# Patient Record
Sex: MALE | Race: WHITE | NOT HISPANIC OR LATINO | Employment: OTHER | URBAN - METROPOLITAN AREA
[De-identification: names, ages, dates, MRNs, and addresses within clinical notes are randomized per-mention and may not be internally consistent; named-entity substitution may affect disease eponyms.]

---

## 2018-08-13 LAB — CRC RECOMMENDATION EXT: NORMAL

## 2018-09-07 ENCOUNTER — OFFICE VISIT (OUTPATIENT)
Dept: UROLOGY | Facility: CLINIC | Age: 58
End: 2018-09-07
Payer: COMMERCIAL

## 2018-09-07 VITALS
HEART RATE: 85 BPM | TEMPERATURE: 98 F | HEIGHT: 67 IN | DIASTOLIC BLOOD PRESSURE: 77 MMHG | SYSTOLIC BLOOD PRESSURE: 129 MMHG

## 2018-09-07 DIAGNOSIS — R30.0 DYSURIA: Primary | ICD-10-CM

## 2018-09-07 DIAGNOSIS — N40.1 BPH WITH OBSTRUCTION/LOWER URINARY TRACT SYMPTOMS: ICD-10-CM

## 2018-09-07 DIAGNOSIS — N13.8 BPH WITH OBSTRUCTION/LOWER URINARY TRACT SYMPTOMS: ICD-10-CM

## 2018-09-07 DIAGNOSIS — R39.198 INCREASING RESIDUAL URINE: ICD-10-CM

## 2018-09-07 DIAGNOSIS — R30.9 PAINFUL URINATION: ICD-10-CM

## 2018-09-07 DIAGNOSIS — R39.15 URINARY URGENCY: ICD-10-CM

## 2018-09-07 LAB
BACTERIA #/AREA URNS AUTO: NORMAL /HPF
BILIRUB UR QL STRIP: NEGATIVE
CLARITY UR REFRACT.AUTO: CLEAR
COLOR UR AUTO: ABNORMAL
GLUCOSE UR QL STRIP: NEGATIVE
HGB UR QL STRIP: NEGATIVE
KETONES UR QL STRIP: NEGATIVE
LEUKOCYTE ESTERASE UR QL STRIP: NEGATIVE
MICROSCOPIC COMMENT: NORMAL
NITRITE UR QL STRIP: POSITIVE
PH UR STRIP: 5 [PH] (ref 5–8)
PROT UR QL STRIP: NEGATIVE
SP GR UR STRIP: 1.01 (ref 1–1.03)
URN SPEC COLLECT METH UR: ABNORMAL
UROBILINOGEN UR STRIP-ACNC: 4 EU/DL
WBC #/AREA URNS AUTO: 0 /HPF (ref 0–5)

## 2018-09-07 PROCEDURE — 87086 URINE CULTURE/COLONY COUNT: CPT

## 2018-09-07 PROCEDURE — 99204 OFFICE O/P NEW MOD 45 MIN: CPT | Mod: 25,S$GLB,, | Performed by: STUDENT IN AN ORGANIZED HEALTH CARE EDUCATION/TRAINING PROGRAM

## 2018-09-07 PROCEDURE — 81001 URINALYSIS AUTO W/SCOPE: CPT

## 2018-09-07 PROCEDURE — 99999 PR PBB SHADOW E&M-NEW PATIENT-LVL III: CPT | Mod: PBBFAC,,, | Performed by: STUDENT IN AN ORGANIZED HEALTH CARE EDUCATION/TRAINING PROGRAM

## 2018-09-07 PROCEDURE — 51702 INSERT TEMP BLADDER CATH: CPT | Mod: S$GLB,,, | Performed by: STUDENT IN AN ORGANIZED HEALTH CARE EDUCATION/TRAINING PROGRAM

## 2018-09-07 RX ORDER — SULFAMETHOXAZOLE AND TRIMETHOPRIM 800; 160 MG/1; MG/1
1 TABLET ORAL 2 TIMES DAILY
Refills: 0 | COMMUNITY
Start: 2018-09-03 | End: 2018-09-07

## 2018-09-07 RX ORDER — ATORVASTATIN CALCIUM 40 MG/1
40 TABLET, FILM COATED ORAL NIGHTLY
COMMUNITY
End: 2022-06-03 | Stop reason: SDUPTHER

## 2018-09-07 RX ORDER — PHENAZOPYRIDINE HYDROCHLORIDE 200 MG/1
200 TABLET, FILM COATED ORAL 3 TIMES DAILY PRN
COMMUNITY
End: 2018-10-11 | Stop reason: SDUPTHER

## 2018-09-07 RX ORDER — PAROXETINE 10 MG/1
10 TABLET, FILM COATED ORAL NIGHTLY
COMMUNITY
End: 2022-06-03 | Stop reason: SDUPTHER

## 2018-09-07 RX ORDER — OMEPRAZOLE 20 MG/1
20 CAPSULE, DELAYED RELEASE ORAL NIGHTLY
COMMUNITY
End: 2022-06-03 | Stop reason: SDUPTHER

## 2018-09-07 RX ORDER — ICOSAPENT ETHYL 1000 MG/1
CAPSULE ORAL NIGHTLY
Refills: 4 | COMMUNITY
Start: 2018-08-02 | End: 2022-06-03 | Stop reason: SDUPTHER

## 2018-09-07 RX ORDER — ASPIRIN 81 MG/1
81 TABLET ORAL DAILY
COMMUNITY
End: 2021-10-23

## 2018-09-07 RX ORDER — OXYBUTYNIN CHLORIDE 10 MG/1
10 TABLET, EXTENDED RELEASE ORAL DAILY
COMMUNITY
End: 2018-09-07

## 2018-09-07 RX ORDER — TAMSULOSIN HYDROCHLORIDE 0.4 MG/1
0.4 CAPSULE ORAL DAILY
COMMUNITY
End: 2021-11-01

## 2018-09-07 NOTE — PROGRESS NOTES
Subjective:       Patient ID: Vaughn Dietz is a 58 y.o. male.    Chief Complaint:  Pain with urination  This is a 58 y.o.  male patient that is new to me.  The patient is self referred to me for burning with urination and presents as an add on to clinic today.  Over the weekend on Saturday he experienced chills. A day or 2 later, he experienced difficulty urinating. He notes he is only able to void a small amount about 1-2 ounces at the most. He notes significant dysuria when he begins to urinate which is extremely painful for him. He is also having difficulty controlling his bowels. On Monday he called Dr. Joy's office and was prescribed bactrim which he did start on Monday. He has been taking this medication since Thursday and states he did not notice any improvement. He made an appointment and he saw Dr. Joy on Thursday.  He prescribed a medicine to help him with overactive bladder - oxybutynin. He is unsure if a urine culture was sent. He does not have any history of UTIs in the past. He has not had any unprotected sexual activity. Denies kidney stones. He has been taking flomax for many years.   PCP - at [facility]. He previously saw Dr. Briseno before switching to Dr. Joy.   Daughter works for [employer].   ---  Past Medical History:   Diagnosis Date    GERD (gastroesophageal reflux disease)     Hyperlipidemia        Past Surgical History:   Procedure Laterality Date    APPENDECTOMY      5 years old    COSMETIC SURGERY         History reviewed. No pertinent family history.    Social History     Tobacco Use    Smoking status: Never Smoker    Smokeless tobacco: Never Used   Substance Use Topics    Alcohol use: Not on file    Drug use: Not on file       Current Outpatient Medications on File Prior to Visit   Medication Sig Dispense Refill    aspirin (ECOTRIN) 81 MG EC tablet Take 81 mg by mouth once daily.      atorvastatin (LIPITOR) 40 MG tablet Take 40 mg by mouth once daily.      omeprazole (PRILOSEC)  20 MG capsule Take 20 mg by mouth once daily.      paroxetine (PAXIL) 10 MG tablet Take 10 mg by mouth every morning.      phenazopyridine (PYRIDIUM) 200 MG tablet Take 200 mg by mouth 3 (three) times daily as needed for Pain.      tamsulosin (FLOMAX) 0.4 mg Cap Take 0.4 mg by mouth once daily.      VASCEPA 1 gram Cap TK 1 C PO QD  4    [DISCONTINUED] oxybutynin (DITROPAN-XL) 10 MG 24 hr tablet Take 10 mg by mouth once daily.      [DISCONTINUED] sulfamethoxazole-trimethoprim 800-160mg (BACTRIM DS) 800-160 mg Tab Take 1 tablet by mouth 2 (two) times daily.  0     No current facility-administered medications on file prior to visit.        Review of patient's allergies indicates:  Allergies not on file    Review of Systems   Constitutional: Negative for chills.   HENT: Negative for congestion.    Eyes: Negative for visual disturbance.   Respiratory: Negative for shortness of breath.    Cardiovascular: Negative for chest pain.   Gastrointestinal: Negative for abdominal distention.   Musculoskeletal: Negative for gait problem.   Skin: Negative for color change.   Neurological: Negative for dizziness.   Psychiatric/Behavioral: Negative for agitation.       Objective:      Physical Exam   Constitutional: He appears well-developed and well-nourished.   HENT:   Head: Normocephalic.   Eyes: Pupils are equal, round, and reactive to light.   Neck: Normal range of motion.   Cardiovascular: Intact distal pulses.   Pulmonary/Chest: Effort normal.   Abdominal: Soft. He exhibits no distension. There is no tenderness.   Genitourinary:   Genitourinary Comments: Circumcised phallus, normal orthotopic meatus. No lesions.   TICO not performed in light of active prostatitis process.  16 french coude inserted using sterile techniques, drained about 280-290 of orange urine. Some urine collected in specimen cup to be sent for Ua and culture. Inflated de la garza with 10cc of sterile saline and connected to a leg bag.   Musculoskeletal: Normal  range of motion.   Neurological: He is alert.   Skin: Skin is warm and dry.   Psychiatric: He has a normal mood and affect.       Assessment:       1. Dysuria    2. Urinary urgency    3. Painful urination    4. Increasing residual urine    5. BPH with obstruction/lower urinary tract symptoms        Plan:         I counseled the patient that he may be experiencing a UTI versus prostatitis. He likely has a history of enlarged prostate as he was started on flomax and has been taking flomax for years.     1. Catheterized urine sample sent for UA and culture, if UTI will await sensitivities while patient has been started on cipro. Physical prescription handwritten and given to patient.   2. Physical prescription for cipro x 30 days for presumed prostatitis given to patient. 1 refill on the Rx.   3. Since his residual when checked after voiding was 287ml and he is experiencing significant hesitancy and only voiding small volumes, I offered him a de la garza catheter to help alleviate his dysuria and the large residual of urine for the weekend. He was willing to undergo de la garza placement. Inserted 16 Tajik coude which drained about 280cc of orange urine (patient was taking Azo). He will also stop taking oxybutynin.  4. RTC on Monday for voiding trial.     Dysuria  -     Urinalysis  -     Urinalysis Microscopic  -     Urine culture    Urinary urgency    Painful urination    Increasing residual urine    BPH with obstruction/lower urinary tract symptoms

## 2018-09-08 LAB — BACTERIA UR CULT: NO GROWTH

## 2018-09-10 ENCOUNTER — OFFICE VISIT (OUTPATIENT)
Dept: UROLOGY | Facility: CLINIC | Age: 58
End: 2018-09-10
Payer: COMMERCIAL

## 2018-09-10 VITALS
BODY MASS INDEX: 33.64 KG/M2 | HEIGHT: 67 IN | TEMPERATURE: 98 F | HEART RATE: 93 BPM | WEIGHT: 214.31 LBS | SYSTOLIC BLOOD PRESSURE: 138 MMHG | DIASTOLIC BLOOD PRESSURE: 80 MMHG

## 2018-09-10 DIAGNOSIS — N41.9 PROSTATITIS, UNSPECIFIED PROSTATITIS TYPE: Primary | ICD-10-CM

## 2018-09-10 DIAGNOSIS — R30.0 DYSURIA: ICD-10-CM

## 2018-09-10 DIAGNOSIS — R39.198 INCREASING RESIDUAL URINE: ICD-10-CM

## 2018-09-10 PROCEDURE — 3008F BODY MASS INDEX DOCD: CPT | Mod: CPTII,S$GLB,, | Performed by: STUDENT IN AN ORGANIZED HEALTH CARE EDUCATION/TRAINING PROGRAM

## 2018-09-10 PROCEDURE — 99213 OFFICE O/P EST LOW 20 MIN: CPT | Mod: S$GLB,,, | Performed by: STUDENT IN AN ORGANIZED HEALTH CARE EDUCATION/TRAINING PROGRAM

## 2018-09-10 PROCEDURE — 99999 PR PBB SHADOW E&M-EST. PATIENT-LVL III: CPT | Mod: PBBFAC,,, | Performed by: STUDENT IN AN ORGANIZED HEALTH CARE EDUCATION/TRAINING PROGRAM

## 2018-09-10 RX ORDER — CIPROFLOXACIN 500 MG/1
TABLET ORAL
Refills: 1 | COMMUNITY
Start: 2018-09-07 | End: 2021-02-01

## 2018-09-10 NOTE — PROGRESS NOTES
Subjective:       Patient ID: Vaughn Dietz is a 58 y.o. male.    Chief Complaint: Follow-up (De La Garza remove)  This is a 58 y.o.  male patient that is an established patient of mine.   The patient is self referred to me for burning with urination and presents as an add on to clinic today.  Over the weekend on Saturday he experienced chills. A day or 2 later, he experienced difficulty urinating. He notes he is only able to void a small amount about 1-2 ounces at the most. He notes significant dysuria when he begins to urinate which is extremely painful for him. He is also having difficulty controlling his bowels. On Monday he called Dr. Joy's office and was prescribed bactrim which he did start on Monday. He has been taking this medication since Thursday and states he did not notice any improvement. He made an appointment and he saw Dr. Joy on Thursday.  He prescribed a medicine to help him with overactive bladder - oxybutynin. He is unsure if a urine culture was sent. He does not have any history of UTIs in the past. He has not had any unprotected sexual activity. Denies kidney stones. He has been taking flomax for many years.   PCP - at [facility]. He previously saw Dr. Briseno before switching to Dr. Joy.   Daughter works for [employer].     9/10/18  He has been taking cipro since he last saw me. He states the weekend was much more bearable due to the de la garza catheter insertion. He has been taking flomax. He is here today for a voiding trial.     ---  Past Medical History:   Diagnosis Date    GERD (gastroesophageal reflux disease)     Hyperlipidemia        Past Surgical History:   Procedure Laterality Date    APPENDECTOMY      5 years old    COSMETIC SURGERY         No family history on file.    Social History     Tobacco Use    Smoking status: Never Smoker    Smokeless tobacco: Never Used   Substance Use Topics    Alcohol use: Not on file    Drug use: Not on file       Current Outpatient Medications on File Prior  to Visit   Medication Sig Dispense Refill    aspirin (ECOTRIN) 81 MG EC tablet Take 81 mg by mouth once daily.      atorvastatin (LIPITOR) 40 MG tablet Take 40 mg by mouth once daily.      ciprofloxacin HCl (CIPRO) 500 MG tablet TK 1 T PO  BID FOR 30 DAYS  1    omeprazole (PRILOSEC) 20 MG capsule Take 20 mg by mouth once daily.      paroxetine (PAXIL) 10 MG tablet Take 10 mg by mouth every morning.      phenazopyridine (PYRIDIUM) 200 MG tablet Take 200 mg by mouth 3 (three) times daily as needed for Pain.      tamsulosin (FLOMAX) 0.4 mg Cap Take 0.4 mg by mouth once daily.      VASCEPA 1 gram Cap TK 1 C PO QD  4     No current facility-administered medications on file prior to visit.        Review of patient's allergies indicates:  No Known Allergies    Review of Systems   Constitutional: Negative for chills.   HENT: Negative for congestion.    Eyes: Negative for visual disturbance.   Respiratory: Negative for shortness of breath.    Cardiovascular: Negative for chest pain.   Gastrointestinal: Negative for abdominal distention.   Musculoskeletal: Negative for gait problem.   Skin: Negative for color change.   Neurological: Negative for dizziness.   Psychiatric/Behavioral: Negative for agitation.       Objective:      Physical Exam   Constitutional: He appears well-developed and well-nourished.   HENT:   Head: Normocephalic.   Eyes: Pupils are equal, round, and reactive to light.   Neck: Normal range of motion.   Cardiovascular: Intact distal pulses.   Pulmonary/Chest: Effort normal.   Abdominal: Soft. He exhibits no distension. There is no tenderness.   Genitourinary:   Genitourinary Comments: Catheter draining orange urine into leg bag (patient on pyridium); removed today in clinic successful voiding trial   Musculoskeletal: Normal range of motion.   Neurological: He is alert.   Skin: Skin is warm and dry.   Psychiatric: He has a normal mood and affect.       Assessment:       1. Prostatitis, unspecified  prostatitis type    2. Dysuria    3. Increasing residual urine        Plan:       1. Successful voiding trial in clinic.  2. Patient returns back about 1 hour later, he successfully voided again.  3. Will have patient continue cipro antibiotics.  4. RTC in 4 weeks for another uroflow, bladder scan, AUA symptom score survey. Can consider cystoscopy or other workup if symptoms still bothersome.   5. Patient has PSA checked by primary care physicians, will bring results to next clinic visit.    Prostatitis, unspecified prostatitis type    Dysuria    Increasing residual urine

## 2018-10-08 ENCOUNTER — OFFICE VISIT (OUTPATIENT)
Dept: UROLOGY | Facility: CLINIC | Age: 58
End: 2018-10-08
Payer: COMMERCIAL

## 2018-10-08 VITALS
DIASTOLIC BLOOD PRESSURE: 82 MMHG | HEIGHT: 67 IN | HEART RATE: 86 BPM | WEIGHT: 214 LBS | SYSTOLIC BLOOD PRESSURE: 122 MMHG | BODY MASS INDEX: 33.59 KG/M2

## 2018-10-08 DIAGNOSIS — R30.0 DYSURIA: ICD-10-CM

## 2018-10-08 DIAGNOSIS — N40.1 BPH WITH OBSTRUCTION/LOWER URINARY TRACT SYMPTOMS: Primary | ICD-10-CM

## 2018-10-08 DIAGNOSIS — N13.8 BPH WITH OBSTRUCTION/LOWER URINARY TRACT SYMPTOMS: Primary | ICD-10-CM

## 2018-10-08 LAB
BILIRUB UR QL STRIP: NEGATIVE
CLARITY UR REFRACT.AUTO: CLEAR
COLOR UR AUTO: YELLOW
GLUCOSE UR QL STRIP: NEGATIVE
HGB UR QL STRIP: NEGATIVE
KETONES UR QL STRIP: NEGATIVE
LEUKOCYTE ESTERASE UR QL STRIP: NEGATIVE
MICROSCOPIC COMMENT: NORMAL
NITRITE UR QL STRIP: NEGATIVE
PH UR STRIP: 5 [PH] (ref 5–8)
PROT UR QL STRIP: NEGATIVE
RBC #/AREA URNS AUTO: 0 /HPF (ref 0–4)
SP GR UR STRIP: 1.02 (ref 1–1.03)
SQUAMOUS #/AREA URNS AUTO: 0 /HPF
URN SPEC COLLECT METH UR: NORMAL
UROBILINOGEN UR STRIP-ACNC: NEGATIVE EU/DL
WBC #/AREA URNS AUTO: 1 /HPF (ref 0–5)

## 2018-10-08 PROCEDURE — 51741 ELECTRO-UROFLOWMETRY FIRST: CPT | Mod: S$GLB,,, | Performed by: STUDENT IN AN ORGANIZED HEALTH CARE EDUCATION/TRAINING PROGRAM

## 2018-10-08 PROCEDURE — 99213 OFFICE O/P EST LOW 20 MIN: CPT | Mod: 25,S$GLB,, | Performed by: STUDENT IN AN ORGANIZED HEALTH CARE EDUCATION/TRAINING PROGRAM

## 2018-10-08 PROCEDURE — 81001 URINALYSIS AUTO W/SCOPE: CPT

## 2018-10-08 PROCEDURE — 87086 URINE CULTURE/COLONY COUNT: CPT

## 2018-10-08 PROCEDURE — 99999 PR PBB SHADOW E&M-EST. PATIENT-LVL III: CPT | Mod: PBBFAC,,, | Performed by: STUDENT IN AN ORGANIZED HEALTH CARE EDUCATION/TRAINING PROGRAM

## 2018-10-08 PROCEDURE — 3008F BODY MASS INDEX DOCD: CPT | Mod: CPTII,S$GLB,, | Performed by: STUDENT IN AN ORGANIZED HEALTH CARE EDUCATION/TRAINING PROGRAM

## 2018-10-08 RX ORDER — FINASTERIDE 5 MG/1
5 TABLET, FILM COATED ORAL DAILY
COMMUNITY
Start: 2018-10-08 | End: 2019-09-07 | Stop reason: SDUPTHER

## 2018-10-08 NOTE — PROGRESS NOTES
"Subjective:       Patient ID: Pietro Dietz is a 58 y.o. male.    Chief Complaint: burning (deep in penis)  This is a 58 y.o.  male patient that is an established patient of mine.  The patient is self referred to me for burning with urination and presents as an add on to clinic today.  Over the weekend on Saturday he experienced chills. A day or 2 later, he experienced difficulty urinating. He notes he is only able to void a small amount about 1-2 ounces at the most. He notes significant dysuria when he begins to urinate which is extremely painful for him. He is also having difficulty controlling his bowels. On Monday he called Dr. Joy's office and was prescribed bactrim which he did start on Monday. He has been taking this medication since Thursday and states he did not notice any improvement. He made an appointment and he saw Dr. Joy on Thursday.  He prescribed a medicine to help him with overactive bladder - oxybutynin. He is unsure if a urine culture was sent. He does not have any history of UTIs in the past. He has not had any unprotected sexual activity. Denies kidney stones. He has been taking flomax for many years.   PCP - at [facility]. He previously saw Dr. Briseno before switching to Dr. Joy.   Daughter works for [employer].     9/10/18  He has been taking cipro since he last saw me. He states the weekend was much more bearable due to the de la garza catheter insertion. He has been taking flomax. He is here today for a voiding trial.    He notes about 1 week and a half ago he notes that he feels that he has a stream.     10/8/18  He has been taking cipro and started on his second month of cipro. About 1-1.5 weeks ago he began to experience burning and a weak stream. He is concerned that maybe he had a "scab" or a "blockage" that developed after the catheter was removed. He stopped taking pyridium. He has been taking flomax (and has been on this for years).     Uroflow - 115cc, low flowrate, flat peak. qmax 3.7 " ml/s; qavgt 2.3ml/s  PVR 6cc    Patient pulled up his previous labs - 2017 - PSA 2.6.     His AUA symptom score today was 14  Incomplete emptying 0  Frequency:  5  Intermittency:  0  Urgency:  3  Weak stream:  5  Strainin  Nocturia:  1  Bother:   5, Unhappy      ---  Past Medical History:   Diagnosis Date    GERD (gastroesophageal reflux disease)     Hyperlipidemia        Past Surgical History:   Procedure Laterality Date    APPENDECTOMY      5 years old    COSMETIC SURGERY         Family History   Problem Relation Age of Onset    Prostate cancer Neg Hx     Kidney disease Neg Hx        Social History     Tobacco Use    Smoking status: Never Smoker    Smokeless tobacco: Never Used   Substance Use Topics    Alcohol use: Yes    Drug use: No       Current Outpatient Medications on File Prior to Visit   Medication Sig Dispense Refill    aspirin (ECOTRIN) 81 MG EC tablet Take 81 mg by mouth once daily.      atorvastatin (LIPITOR) 40 MG tablet Take 40 mg by mouth once daily.      ciprofloxacin HCl (CIPRO) 500 MG tablet TK 1 T PO  BID FOR 30 DAYS  1    finasteride (PROSCAR) 5 mg tablet Take 5 mg by mouth once daily.      omeprazole (PRILOSEC) 20 MG capsule Take 20 mg by mouth once daily.      paroxetine (PAXIL) 10 MG tablet Take 10 mg by mouth every morning.      phenazopyridine (PYRIDIUM) 200 MG tablet Take 200 mg by mouth 3 (three) times daily as needed for Pain.      tamsulosin (FLOMAX) 0.4 mg Cap Take 0.4 mg by mouth once daily.      VASCEPA 1 gram Cap TK 1 C PO QD  4     No current facility-administered medications on file prior to visit.        Review of patient's allergies indicates:   Allergen Reactions    Erythromycin Other (See Comments)       Review of Systems   Constitutional: Negative for chills.   HENT: Negative for congestion.    Eyes: Negative for visual disturbance.   Respiratory: Negative for shortness of breath.    Cardiovascular: Negative for chest pain.   Gastrointestinal:  Negative for abdominal distention.   Musculoskeletal: Negative for gait problem.   Skin: Negative for color change.   Neurological: Negative for dizziness.   Psychiatric/Behavioral: Negative for agitation.       Objective:      Physical Exam   Constitutional: He appears well-developed and well-nourished.   HENT:   Head: Normocephalic.   Eyes: Pupils are equal, round, and reactive to light.   Neck: Normal range of motion.   Cardiovascular: Intact distal pulses.   Pulmonary/Chest: Effort normal.   Abdominal: Soft. He exhibits no distension. There is no tenderness.   Genitourinary:   Genitourinary Comments: 40g, smooth, symmetrical, no hard nodules, nonboggy     Musculoskeletal: Normal range of motion.   Neurological: He is alert.   Skin: Skin is warm and dry.   Psychiatric: He has a normal mood and affect.       Assessment:       1. BPH with obstruction/lower urinary tract symptoms        Plan:       1. Finasteride 5mg 90 days with refills. Physical Rx given to patient. Also added onto med list today.   2. Lidocaine jelly given to patietn to instill for relief (urojet assembled and provided to patient).   3. Pyridium prn burning with urination.   4. Cystoscopy to evaluate prostatic urethra  5. Will send urine for UA and culture.     BPH with obstruction/lower urinary tract symptoms  -     Urinalysis  -     Urinalysis Microscopic  -     Urine culture  -     Cystoscopy; Future; Expected date: 10/08/2018

## 2018-10-09 LAB — BACTERIA UR CULT: NO GROWTH

## 2018-10-10 ENCOUNTER — TELEPHONE (OUTPATIENT)
Dept: UROLOGY | Facility: CLINIC | Age: 58
End: 2018-10-10

## 2018-10-10 NOTE — TELEPHONE ENCOUNTER
----- Message from Heather Walsh MD sent at 10/10/2018  8:04 AM CDT -----  Please call patient and notify of negative urine culture. The urine culture did not grow out bacteria in the urine concerning for a urinary tract infection (UTI), therefore no antibiotics are needed.

## 2018-10-11 ENCOUNTER — PROCEDURE VISIT (OUTPATIENT)
Dept: UROLOGY | Facility: CLINIC | Age: 58
End: 2018-10-11
Payer: COMMERCIAL

## 2018-10-11 ENCOUNTER — PATIENT MESSAGE (OUTPATIENT)
Dept: UROLOGY | Facility: CLINIC | Age: 58
End: 2018-10-11

## 2018-10-11 VITALS
BODY MASS INDEX: 33.59 KG/M2 | HEART RATE: 77 BPM | HEIGHT: 67 IN | DIASTOLIC BLOOD PRESSURE: 74 MMHG | SYSTOLIC BLOOD PRESSURE: 121 MMHG | TEMPERATURE: 98 F | WEIGHT: 214 LBS

## 2018-10-11 DIAGNOSIS — N35.811 OTHER URETHRAL STRICTURE, MALE, MEATAL: Primary | ICD-10-CM

## 2018-10-11 DIAGNOSIS — R39.12 WEAK URINARY STREAM: ICD-10-CM

## 2018-10-11 DIAGNOSIS — N40.1 BPH WITH OBSTRUCTION/LOWER URINARY TRACT SYMPTOMS: ICD-10-CM

## 2018-10-11 DIAGNOSIS — N13.8 BPH WITH OBSTRUCTION/LOWER URINARY TRACT SYMPTOMS: ICD-10-CM

## 2018-10-11 DIAGNOSIS — R39.16 STRAINING DURING URINATION: ICD-10-CM

## 2018-10-11 PROCEDURE — 52000 CYSTOURETHROSCOPY: CPT | Mod: S$GLB,,, | Performed by: STUDENT IN AN ORGANIZED HEALTH CARE EDUCATION/TRAINING PROGRAM

## 2018-10-11 RX ORDER — FINASTERIDE 5 MG/1
5 TABLET, FILM COATED ORAL DAILY
Qty: 15 TABLET | Refills: 0 | Status: SHIPPED | OUTPATIENT
Start: 2018-10-11 | End: 2018-10-26 | Stop reason: SDUPTHER

## 2018-10-11 RX ORDER — PHENAZOPYRIDINE HYDROCHLORIDE 200 MG/1
200 TABLET, FILM COATED ORAL 3 TIMES DAILY PRN
Qty: 15 TABLET | Refills: 3 | Status: SHIPPED | OUTPATIENT
Start: 2018-10-11 | End: 2018-10-16

## 2018-10-11 NOTE — PROCEDURES
Procedure:   1. Flexible cysto-uretheroscopy  2. Urethral dilation    Pre Procedure Diagnosis:  1. Straining to urinate  2. Weak stream    Post Procedure Diagnosis:  1. Straining to urinate  2. Weak stream  3. Urethral meatal stenosis  4. BPH    Surgeon: Heather Walsh MD    Anesthesia: 2% uro-jet lidocaine jelly for local analgesia    Procedure note:  A flexible cysto-urethroscopy was performed after consent was obtained.  The risks and benefits were explained. 2% lidocaine urojet was used for local analgesia. The genitalia was prepped and draped in the sterile fashion.     The flexible scope was initially attempted to be advanced into the urethra and into the bladder. The scope was met with resistance at the urethral meatus. A meatal stenosis was identified. The urethral dilators were used to dilate the patient up to 18 Bulgarian to accommodate the cystoscope. Afterwards the cystoscope was able to be advanced.     A urethral stricture was not seen during scope passage.     Once the cystoscope was in the bladder, we systematically surveyed the entire bladder. The bilateral ureteral orifices were identified in their normal orthotopic locations. clear bilateral ureteral efflux was identified.     The bladder was completely surveyed in a systematic fashion. No bladder tumors or lesions were seen. No trabeculations or cellules visible. Upon retroflexion a median lobe was noted to be present. There was a mild intravesical extension of a median lobe.    As the flexible cystoscope was being withdrawn, the prostate was evaluated carefully. The lateral lobes of the prostate were noted to be significant enlarged and in contacting with each other, known as kissing lobes.     The patient tolerated the procedure well without complication.     Findings in summary:  1. Urethral meatal stenosis - addressed with urethral dilators up to 18 Bulgarian.  2. Significantly enlarged lateral lobes of the prostate - kissing lobes. Mild median  lobe noted with intravesical extension.  3. No bladder tumors, stones, or any other untoward findings in the bladder.       Assessment: 58 y.o. male with weak stream, straining to urinate, and BPH and urethral meatal stenosis diagnosed upon attempt to pass cystoscope today.      Plan:  1. CIC initiation and counseling today. Patient will start to perform CIC daily x 1 week. Next week can space out to every 2 days. If patient begins to note that the catheter is more difficult to pass he will increase the frequency again. Will likely recommend at least q7 days CIC to ensure patency of urethral meatus.  2. BPH - continue flomax; patient will start finasteride for dual therapy.  3. PCG form for catheters filled out - 16 Frisian straight tip, male length. Checked off supplies required gloves, drapes, sterile wipes with lubricant packets. Length of need - indefinite.

## 2018-10-12 ENCOUNTER — PATIENT MESSAGE (OUTPATIENT)
Dept: UROLOGY | Facility: CLINIC | Age: 58
End: 2018-10-12

## 2018-10-26 RX ORDER — FINASTERIDE 5 MG/1
5 TABLET, FILM COATED ORAL DAILY
Qty: 15 TABLET | Refills: 0 | Status: SHIPPED | OUTPATIENT
Start: 2018-10-26 | End: 2018-11-10

## 2018-12-03 ENCOUNTER — OFFICE VISIT (OUTPATIENT)
Dept: UROLOGY | Facility: CLINIC | Age: 58
End: 2018-12-03
Payer: COMMERCIAL

## 2018-12-03 ENCOUNTER — PATIENT MESSAGE (OUTPATIENT)
Dept: UROLOGY | Facility: CLINIC | Age: 58
End: 2018-12-03

## 2018-12-03 VITALS
DIASTOLIC BLOOD PRESSURE: 88 MMHG | HEIGHT: 67 IN | WEIGHT: 214 LBS | BODY MASS INDEX: 33.59 KG/M2 | HEART RATE: 83 BPM | SYSTOLIC BLOOD PRESSURE: 153 MMHG

## 2018-12-03 DIAGNOSIS — N99.110 POSTPROCEDURAL URETHRAL STRICTURE, MALE, MEATAL: Primary | ICD-10-CM

## 2018-12-03 PROCEDURE — 99999 PR PBB SHADOW E&M-EST. PATIENT-LVL II: CPT | Mod: PBBFAC,,, | Performed by: STUDENT IN AN ORGANIZED HEALTH CARE EDUCATION/TRAINING PROGRAM

## 2018-12-03 PROCEDURE — 3008F BODY MASS INDEX DOCD: CPT | Mod: CPTII,S$GLB,, | Performed by: STUDENT IN AN ORGANIZED HEALTH CARE EDUCATION/TRAINING PROGRAM

## 2018-12-03 PROCEDURE — 99213 OFFICE O/P EST LOW 20 MIN: CPT | Mod: S$GLB,,, | Performed by: STUDENT IN AN ORGANIZED HEALTH CARE EDUCATION/TRAINING PROGRAM

## 2018-12-03 NOTE — PROGRESS NOTES
"Subjective:       Patient ID: Pietro Dietz is a 58 y.o. male.    Chief Complaint: followup  This is a 58 y.o.  male patient that is an established patient of mine.  The patient is self referred to me for burning with urination and presents as an add on to clinic today.  Over the weekend on Saturday he experienced chills. A day or 2 later, he experienced difficulty urinating. He notes he is only able to void a small amount about 1-2 ounces at the most. He notes significant dysuria when he begins to urinate which is extremely painful for him. He is also having difficulty controlling his bowels. On Monday he called Dr. Joy's office and was prescribed bactrim which he did start on Monday. He has been taking this medication since Thursday and states he did not notice any improvement. He made an appointment and he saw Dr. Joy on Thursday.  He prescribed a medicine to help him with overactive bladder - oxybutynin. He is unsure if a urine culture was sent. He does not have any history of UTIs in the past. He has not had any unprotected sexual activity. Denies kidney stones. He has been taking flomax for many years.   PCP - at [facility]. He previously saw Dr. Briseno before switching to Dr. Joy.   Daughter works for [employer].     9/10/18  He has been taking cipro since he last saw me. He states the weekend was much more bearable due to the de la garza catheter insertion. He has been taking flomax. He is here today for a voiding trial.    He notes about 1 week and a half ago he notes that he feels that he has a stream.     10/8/18  He has been taking cipro and started on his second month of cipro. About 1-1.5 weeks ago he began to experience burning and a weak stream. He is concerned that maybe he had a "scab" or a "blockage" that developed after the catheter was removed. He stopped taking pyridium. He has been taking flomax (and has been on this for years).     Uroflow - 115cc, low flowrate, flat peak. qmax 3.7 ml/s; qavgt " 2.3ml/s  PVR 6cc    Patient pulled up his previous labs - 2017 - PSA 2.6.     His AUA symptom score today was 14  Incomplete emptying 0  Frequency:  5  Intermittency:  0  Urgency:  3  Weak stream:  5  Strainin  Nocturia:  1  Bother:   5, Unhappy    He underwent a cystoscopy on 10/11/2018 and the findings were:  1. Urethral meatal stenosis - addressed with urethral dilators up to 18 Serbian.  2. Significantly enlarged lateral lobes of the prostate - kissing lobes. Mild median lobe noted with intravesical extension.  3. No bladder tumors, stones, or any other untoward findings in the bladder.     He returns back today to discuss that he last passed the catheter about 2-3 weeks ago and then a few days ago.  He has been intermittently passing a 16 Serbian catheter. He notes that the most recent passage was very difficult.     No results found for: CREATININE    ---  Past Medical History:   Diagnosis Date    GERD (gastroesophageal reflux disease)     Hyperlipidemia        Past Surgical History:   Procedure Laterality Date    APPENDECTOMY      5 years old    COSMETIC SURGERY         Family History   Problem Relation Age of Onset    Prostate cancer Neg Hx     Kidney disease Neg Hx        Social History     Tobacco Use    Smoking status: Never Smoker    Smokeless tobacco: Never Used   Substance Use Topics    Alcohol use: Yes    Drug use: No       Current Outpatient Medications on File Prior to Visit   Medication Sig Dispense Refill    aspirin (ECOTRIN) 81 MG EC tablet Take 81 mg by mouth once daily.      atorvastatin (LIPITOR) 40 MG tablet Take 40 mg by mouth once daily.      ciprofloxacin HCl (CIPRO) 500 MG tablet TK 1 T PO  BID FOR 30 DAYS  1    finasteride (PROSCAR) 5 mg tablet Take 5 mg by mouth once daily.      omeprazole (PRILOSEC) 20 MG capsule Take 20 mg by mouth once daily.      paroxetine (PAXIL) 10 MG tablet Take 10 mg by mouth every morning.      tamsulosin (FLOMAX) 0.4 mg Cap Take 0.4  mg by mouth once daily.      VASCEPA 1 gram Cap TK 1 C PO QD  4     No current facility-administered medications on file prior to visit.        Review of patient's allergies indicates:   Allergen Reactions    Erythromycin Other (See Comments)       Review of Systems   Constitutional: Negative for chills.   HENT: Negative for congestion.    Eyes: Negative for visual disturbance.   Respiratory: Negative for shortness of breath.    Cardiovascular: Negative for chest pain.   Gastrointestinal: Negative for abdominal distention.   Musculoskeletal: Negative for gait problem.   Skin: Negative for color change.   Neurological: Negative for dizziness.   Psychiatric/Behavioral: Negative for agitation.       Objective:      Physical Exam   Constitutional: He appears well-developed and well-nourished.   HENT:   Head: Normocephalic.   Eyes: Pupils are equal, round, and reactive to light.   Neck: Normal range of motion.   Cardiovascular: Intact distal pulses.   Pulmonary/Chest: Effort normal.   Musculoskeletal: Normal range of motion.   Neurological: He is alert.   Skin: Skin is warm and dry.   Psychiatric: He has a normal mood and affect.       Assessment:       1. Postprocedural urethral stricture, male, meatal        Plan:       1. The patient unfortunately was not clear on my instructions. He was to space out his catheterizations from daily the first week, to every other day the second week, to every 3 days the third week, and continue to space out if the catheter continues to pass well at the longest every 7 days.   2. Unfortunately he catheterized a few weeks ago and the urethra was met with resistance at his most recent catheterization but he was able to catheterize.  3. Will change the de la garza catheter order to 14 Gambian, at the most and least frequent, will catheterize every 7 days. If he meets resistance he should catheterize more often.    Postprocedural urethral stricture, male, meatal

## 2019-10-10 RX ORDER — FINASTERIDE 5 MG/1
TABLET, FILM COATED ORAL
Qty: 90 TABLET | Refills: 4 | Status: SHIPPED | OUTPATIENT
Start: 2019-10-10 | End: 2020-10-28

## 2021-01-30 ENCOUNTER — PATIENT MESSAGE (OUTPATIENT)
Dept: UROLOGY | Facility: CLINIC | Age: 61
End: 2021-01-30

## 2021-02-01 DIAGNOSIS — N41.9 PROSTATITIS, UNSPECIFIED PROSTATITIS TYPE: Primary | ICD-10-CM

## 2021-02-01 RX ORDER — CIPROFLOXACIN 500 MG/1
500 TABLET ORAL EVERY 12 HOURS
Qty: 60 TABLET | Refills: 0 | Status: SHIPPED | OUTPATIENT
Start: 2021-02-01 | End: 2021-03-03

## 2021-02-08 ENCOUNTER — OFFICE VISIT (OUTPATIENT)
Dept: UROLOGY | Facility: CLINIC | Age: 61
End: 2021-02-08
Payer: COMMERCIAL

## 2021-02-08 VITALS
BODY MASS INDEX: 34.39 KG/M2 | HEART RATE: 93 BPM | DIASTOLIC BLOOD PRESSURE: 85 MMHG | SYSTOLIC BLOOD PRESSURE: 151 MMHG | HEIGHT: 66 IN | WEIGHT: 214 LBS

## 2021-02-08 DIAGNOSIS — N50.819 PAIN IN TESTICLE, UNSPECIFIED LATERALITY: Primary | ICD-10-CM

## 2021-02-08 DIAGNOSIS — Z12.5 ENCOUNTER FOR PROSTATE CANCER SCREENING: ICD-10-CM

## 2021-02-08 PROCEDURE — 1125F PR PAIN SEVERITY QUANTIFIED, PAIN PRESENT: ICD-10-PCS | Mod: S$GLB,,, | Performed by: STUDENT IN AN ORGANIZED HEALTH CARE EDUCATION/TRAINING PROGRAM

## 2021-02-08 PROCEDURE — 3008F PR BODY MASS INDEX (BMI) DOCUMENTED: ICD-10-PCS | Mod: CPTII,S$GLB,, | Performed by: STUDENT IN AN ORGANIZED HEALTH CARE EDUCATION/TRAINING PROGRAM

## 2021-02-08 PROCEDURE — 1125F AMNT PAIN NOTED PAIN PRSNT: CPT | Mod: S$GLB,,, | Performed by: STUDENT IN AN ORGANIZED HEALTH CARE EDUCATION/TRAINING PROGRAM

## 2021-02-08 PROCEDURE — 99999 PR PBB SHADOW E&M-EST. PATIENT-LVL IV: ICD-10-PCS | Mod: PBBFAC,,, | Performed by: STUDENT IN AN ORGANIZED HEALTH CARE EDUCATION/TRAINING PROGRAM

## 2021-02-08 PROCEDURE — 99214 OFFICE O/P EST MOD 30 MIN: CPT | Mod: S$GLB,,, | Performed by: STUDENT IN AN ORGANIZED HEALTH CARE EDUCATION/TRAINING PROGRAM

## 2021-02-08 PROCEDURE — 3008F BODY MASS INDEX DOCD: CPT | Mod: CPTII,S$GLB,, | Performed by: STUDENT IN AN ORGANIZED HEALTH CARE EDUCATION/TRAINING PROGRAM

## 2021-02-08 PROCEDURE — 99214 PR OFFICE/OUTPT VISIT, EST, LEVL IV, 30-39 MIN: ICD-10-PCS | Mod: S$GLB,,, | Performed by: STUDENT IN AN ORGANIZED HEALTH CARE EDUCATION/TRAINING PROGRAM

## 2021-02-08 PROCEDURE — 99999 PR PBB SHADOW E&M-EST. PATIENT-LVL IV: CPT | Mod: PBBFAC,,, | Performed by: STUDENT IN AN ORGANIZED HEALTH CARE EDUCATION/TRAINING PROGRAM

## 2021-02-15 ENCOUNTER — HOSPITAL ENCOUNTER (OUTPATIENT)
Dept: RADIOLOGY | Facility: HOSPITAL | Age: 61
Discharge: HOME | End: 2021-02-15
Attending: STUDENT IN AN ORGANIZED HEALTH CARE EDUCATION/TRAINING PROGRAM
Payer: COMMERCIAL

## 2021-02-15 DIAGNOSIS — N50.819 PAIN IN TESTICLE, UNSPECIFIED LATERALITY: ICD-10-CM

## 2021-02-15 PROCEDURE — 76870 US EXAM SCROTUM: CPT | Mod: 26,,, | Performed by: RADIOLOGY

## 2021-02-15 PROCEDURE — 76870 US EXAM SCROTUM: CPT | Mod: TC

## 2021-02-15 PROCEDURE — 76870 US SCROTUM AND TESTICLES: ICD-10-PCS | Mod: 26,,, | Performed by: RADIOLOGY

## 2021-02-24 ENCOUNTER — PATIENT MESSAGE (OUTPATIENT)
Dept: UROLOGY | Facility: CLINIC | Age: 61
End: 2021-02-24

## 2021-02-25 ENCOUNTER — IMMUNIZATION (OUTPATIENT)
Dept: INTERNAL MEDICINE | Facility: CLINIC | Age: 61
End: 2021-02-25
Payer: COMMERCIAL

## 2021-02-25 DIAGNOSIS — Z23 NEED FOR VACCINATION: Primary | ICD-10-CM

## 2021-02-25 PROCEDURE — 91300 COVID-19, MRNA, LNP-S, PF, 30 MCG/0.3 ML DOSE VACCINE: ICD-10-PCS | Mod: S$GLB,,, | Performed by: INTERNAL MEDICINE

## 2021-02-25 PROCEDURE — 0001A COVID-19, MRNA, LNP-S, PF, 30 MCG/0.3 ML DOSE VACCINE: ICD-10-PCS | Mod: CV19,S$GLB,, | Performed by: INTERNAL MEDICINE

## 2021-02-25 PROCEDURE — 0001A COVID-19, MRNA, LNP-S, PF, 30 MCG/0.3 ML DOSE VACCINE: CPT | Mod: CV19,S$GLB,, | Performed by: INTERNAL MEDICINE

## 2021-02-25 PROCEDURE — 91300 COVID-19, MRNA, LNP-S, PF, 30 MCG/0.3 ML DOSE VACCINE: CPT | Mod: S$GLB,,, | Performed by: INTERNAL MEDICINE

## 2021-03-18 ENCOUNTER — IMMUNIZATION (OUTPATIENT)
Dept: INTERNAL MEDICINE | Facility: CLINIC | Age: 61
End: 2021-03-18
Payer: COMMERCIAL

## 2021-03-18 DIAGNOSIS — Z23 NEED FOR VACCINATION: Primary | ICD-10-CM

## 2021-03-18 PROCEDURE — 91300 COVID-19, MRNA, LNP-S, PF, 30 MCG/0.3 ML DOSE VACCINE: CPT | Mod: PBBFAC | Performed by: INTERNAL MEDICINE

## 2021-03-18 PROCEDURE — 0002A COVID-19, MRNA, LNP-S, PF, 30 MCG/0.3 ML DOSE VACCINE: CPT | Mod: PBBFAC | Performed by: INTERNAL MEDICINE

## 2021-05-05 ENCOUNTER — PATIENT MESSAGE (OUTPATIENT)
Dept: UROLOGY | Facility: CLINIC | Age: 61
End: 2021-05-05

## 2021-07-22 ENCOUNTER — OFFICE VISIT (OUTPATIENT)
Dept: UROLOGY | Facility: CLINIC | Age: 61
End: 2021-07-22
Payer: COMMERCIAL

## 2021-07-22 VITALS
DIASTOLIC BLOOD PRESSURE: 81 MMHG | WEIGHT: 198 LBS | HEIGHT: 66 IN | BODY MASS INDEX: 31.82 KG/M2 | HEART RATE: 75 BPM | SYSTOLIC BLOOD PRESSURE: 146 MMHG

## 2021-07-22 DIAGNOSIS — Z12.5 ENCOUNTER FOR PROSTATE CANCER SCREENING: Primary | ICD-10-CM

## 2021-07-22 DIAGNOSIS — R10.2 PERINEUM PAIN, MALE: ICD-10-CM

## 2021-07-22 PROCEDURE — 99214 PR OFFICE/OUTPT VISIT, EST, LEVL IV, 30-39 MIN: ICD-10-PCS | Mod: S$GLB,,, | Performed by: STUDENT IN AN ORGANIZED HEALTH CARE EDUCATION/TRAINING PROGRAM

## 2021-07-22 PROCEDURE — 99999 PR PBB SHADOW E&M-EST. PATIENT-LVL III: CPT | Mod: PBBFAC,,, | Performed by: STUDENT IN AN ORGANIZED HEALTH CARE EDUCATION/TRAINING PROGRAM

## 2021-07-22 PROCEDURE — 1126F PR PAIN SEVERITY QUANTIFIED, NO PAIN PRESENT: ICD-10-PCS | Mod: CPTII,S$GLB,, | Performed by: STUDENT IN AN ORGANIZED HEALTH CARE EDUCATION/TRAINING PROGRAM

## 2021-07-22 PROCEDURE — 1126F AMNT PAIN NOTED NONE PRSNT: CPT | Mod: CPTII,S$GLB,, | Performed by: STUDENT IN AN ORGANIZED HEALTH CARE EDUCATION/TRAINING PROGRAM

## 2021-07-22 PROCEDURE — 3008F PR BODY MASS INDEX (BMI) DOCUMENTED: ICD-10-PCS | Mod: CPTII,S$GLB,, | Performed by: STUDENT IN AN ORGANIZED HEALTH CARE EDUCATION/TRAINING PROGRAM

## 2021-07-22 PROCEDURE — 99999 PR PBB SHADOW E&M-EST. PATIENT-LVL III: ICD-10-PCS | Mod: PBBFAC,,, | Performed by: STUDENT IN AN ORGANIZED HEALTH CARE EDUCATION/TRAINING PROGRAM

## 2021-07-22 PROCEDURE — 99214 OFFICE O/P EST MOD 30 MIN: CPT | Mod: S$GLB,,, | Performed by: STUDENT IN AN ORGANIZED HEALTH CARE EDUCATION/TRAINING PROGRAM

## 2021-07-22 PROCEDURE — 3008F BODY MASS INDEX DOCD: CPT | Mod: CPTII,S$GLB,, | Performed by: STUDENT IN AN ORGANIZED HEALTH CARE EDUCATION/TRAINING PROGRAM

## 2021-09-28 ENCOUNTER — IMMUNIZATION (OUTPATIENT)
Dept: INTERNAL MEDICINE | Facility: CLINIC | Age: 61
End: 2021-09-28
Payer: COMMERCIAL

## 2021-09-28 DIAGNOSIS — Z23 NEED FOR VACCINATION: Primary | ICD-10-CM

## 2021-09-28 PROCEDURE — 0003A COVID-19, MRNA, LNP-S, PF, 30 MCG/0.3 ML DOSE VACCINE: CPT | Mod: CV19,PBBFAC | Performed by: INTERNAL MEDICINE

## 2021-09-28 PROCEDURE — 91300 COVID-19, MRNA, LNP-S, PF, 30 MCG/0.3 ML DOSE VACCINE: CPT | Mod: PBBFAC | Performed by: INTERNAL MEDICINE

## 2021-10-23 ENCOUNTER — HOSPITAL ENCOUNTER (EMERGENCY)
Facility: HOSPITAL | Age: 61
Discharge: HOME | End: 2021-10-23
Attending: EMERGENCY MEDICINE
Payer: COMMERCIAL

## 2021-10-23 VITALS
WEIGHT: 198 LBS | HEART RATE: 67 BPM | OXYGEN SATURATION: 99 % | BODY MASS INDEX: 31.96 KG/M2 | DIASTOLIC BLOOD PRESSURE: 73 MMHG | RESPIRATION RATE: 15 BRPM | SYSTOLIC BLOOD PRESSURE: 134 MMHG | TEMPERATURE: 99 F

## 2021-10-23 DIAGNOSIS — S92.001A CLOSED DISPLACED FRACTURE OF RIGHT CALCANEUS, UNSPECIFIED PORTION OF CALCANEUS, INITIAL ENCOUNTER: Primary | ICD-10-CM
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
## 2021-10-23 PROBLEM — S92.041A: Status: ACTIVE | Noted: 2021-10-23

## 2021-10-23 LAB
ALBUMIN SERPL BCP-MCNC: 4.1 G/DL (ref 3.5–5.2)
ALP SERPL-CCNC: 80 U/L (ref 55–135)
ALT SERPL W/O P-5'-P-CCNC: 36 U/L (ref 10–44)
ANION GAP SERPL CALC-SCNC: 9 MMOL/L (ref 8–16)
AST SERPL-CCNC: 28 U/L (ref 10–40)
BASOPHILS # BLD AUTO: 0.03 K/UL (ref 0–0.2)
BASOPHILS NFR BLD: 0.3 % (ref 0–1.9)
BILIRUB SERPL-MCNC: 1.1 MG/DL (ref 0.1–1)
BUN SERPL-MCNC: 29 MG/DL (ref 8–23)
CALCIUM SERPL-MCNC: 9.7 MG/DL (ref 8.7–10.5)
CHLORIDE SERPL-SCNC: 108 MMOL/L (ref 95–110)
CO2 SERPL-SCNC: 24 MMOL/L (ref 23–29)
CREAT SERPL-MCNC: 1.2 MG/DL (ref 0.5–1.4)
DIFFERENTIAL METHOD: ABNORMAL
EOSINOPHIL # BLD AUTO: 0.1 K/UL (ref 0–0.5)
EOSINOPHIL NFR BLD: 0.6 % (ref 0–8)
ERYTHROCYTE [DISTWIDTH] IN BLOOD BY AUTOMATED COUNT: 13.2 % (ref 11.5–14.5)
EST. GFR  (AFRICAN AMERICAN): >60 ML/MIN/1.73 M^2
EST. GFR  (NON AFRICAN AMERICAN): >60 ML/MIN/1.73 M^2
GLUCOSE SERPL-MCNC: 97 MG/DL (ref 70–110)
HCT VFR BLD AUTO: 42.8 % (ref 40–54)
HGB BLD-MCNC: 15.2 G/DL (ref 14–18)
IMM GRANULOCYTES # BLD AUTO: 0.04 K/UL (ref 0–0.04)
IMM GRANULOCYTES NFR BLD AUTO: 0.4 % (ref 0–0.5)
INR PPP: 1 (ref 0.8–1.2)
LYMPHOCYTES # BLD AUTO: 1.1 K/UL (ref 1–4.8)
LYMPHOCYTES NFR BLD: 11 % (ref 18–48)
MCH RBC QN AUTO: 30.5 PG (ref 27–31)
MCHC RBC AUTO-ENTMCNC: 35.5 G/DL (ref 32–36)
MCV RBC AUTO: 86 FL (ref 82–98)
MONOCYTES # BLD AUTO: 0.6 K/UL (ref 0.3–1)
MONOCYTES NFR BLD: 6.1 % (ref 4–15)
NEUTROPHILS # BLD AUTO: 7.9 K/UL (ref 1.8–7.7)
NEUTROPHILS NFR BLD: 81.6 % (ref 38–73)
NRBC BLD-RTO: 0 /100 WBC
PLATELET # BLD AUTO: 200 K/UL (ref 150–450)
PMV BLD AUTO: 10.9 FL (ref 9.2–12.9)
POTASSIUM SERPL-SCNC: 3.9 MMOL/L (ref 3.5–5.1)
PROT SERPL-MCNC: 6.7 G/DL (ref 6–8.4)
PROTHROMBIN TIME: 10.5 SEC (ref 9–12.5)
RBC # BLD AUTO: 4.98 M/UL (ref 4.6–6.2)
SODIUM SERPL-SCNC: 141 MMOL/L (ref 136–145)
WBC # BLD AUTO: 9.64 K/UL (ref 3.9–12.7)

## 2021-10-23 PROCEDURE — 96376 TX/PRO/DX INJ SAME DRUG ADON: CPT

## 2021-10-23 PROCEDURE — 96374 THER/PROPH/DIAG INJ IV PUSH: CPT

## 2021-10-23 PROCEDURE — 63600175 PHARM REV CODE 636 W HCPCS: Performed by: EMERGENCY MEDICINE

## 2021-10-23 PROCEDURE — 80053 COMPREHEN METABOLIC PANEL: CPT | Performed by: EMERGENCY MEDICINE

## 2021-10-23 PROCEDURE — 96375 TX/PRO/DX INJ NEW DRUG ADDON: CPT

## 2021-10-23 PROCEDURE — 99285 EMERGENCY DEPT VISIT HI MDM: CPT | Mod: 25

## 2021-10-23 PROCEDURE — 85610 PROTHROMBIN TIME: CPT | Performed by: EMERGENCY MEDICINE

## 2021-10-23 PROCEDURE — 99285 PR EMERGENCY DEPT VISIT,LEVEL V: ICD-10-PCS | Mod: ,,, | Performed by: EMERGENCY MEDICINE

## 2021-10-23 PROCEDURE — 99285 EMERGENCY DEPT VISIT HI MDM: CPT | Mod: ,,, | Performed by: EMERGENCY MEDICINE

## 2021-10-23 PROCEDURE — 85025 COMPLETE CBC W/AUTO DIFF WBC: CPT | Performed by: EMERGENCY MEDICINE

## 2021-10-23 RX ORDER — DOCUSATE SODIUM 100 MG/1
100 CAPSULE, LIQUID FILLED ORAL 3 TIMES DAILY
Qty: 60 CAPSULE | Refills: 0 | Status: SHIPPED | OUTPATIENT
Start: 2021-10-23 | End: 2021-12-15

## 2021-10-23 RX ORDER — ONDANSETRON 2 MG/ML
4 INJECTION INTRAMUSCULAR; INTRAVENOUS
Status: COMPLETED | OUTPATIENT
Start: 2021-10-23 | End: 2021-10-23

## 2021-10-23 RX ORDER — OXYCODONE HYDROCHLORIDE 5 MG/1
5 TABLET ORAL EVERY 6 HOURS PRN
Qty: 20 TABLET | Refills: 0 | Status: SHIPPED | OUTPATIENT
Start: 2021-10-23 | End: 2021-10-28

## 2021-10-23 RX ORDER — DEXTROMETHORPHAN HYDROBROMIDE, GUAIFENESIN 5; 100 MG/5ML; MG/5ML
650 LIQUID ORAL EVERY 8 HOURS
Qty: 21 TABLET | Refills: 0 | Status: SHIPPED | OUTPATIENT
Start: 2021-10-23 | End: 2021-10-30

## 2021-10-23 RX ORDER — CELECOXIB 200 MG/1
200 CAPSULE ORAL 2 TIMES DAILY
Qty: 30 CAPSULE | Refills: 0 | Status: SHIPPED | OUTPATIENT
Start: 2021-10-23 | End: 2021-11-07

## 2021-10-23 RX ORDER — MORPHINE SULFATE 4 MG/ML
4 INJECTION, SOLUTION INTRAMUSCULAR; INTRAVENOUS
Status: COMPLETED | OUTPATIENT
Start: 2021-10-23 | End: 2021-10-23

## 2021-10-23 RX ORDER — BACLOFEN 20 MG/1
20 TABLET ORAL 3 TIMES DAILY
Qty: 15 TABLET | Refills: 0 | Status: SHIPPED | OUTPATIENT
Start: 2021-10-23 | End: 2021-12-15

## 2021-10-23 RX ORDER — HYDROMORPHONE HYDROCHLORIDE 1 MG/ML
0.5 INJECTION, SOLUTION INTRAMUSCULAR; INTRAVENOUS; SUBCUTANEOUS
Status: COMPLETED | OUTPATIENT
Start: 2021-10-23 | End: 2021-10-23

## 2021-10-23 RX ORDER — NAPROXEN SODIUM 220 MG/1
81 TABLET, FILM COATED ORAL 2 TIMES DAILY
Qty: 60 TABLET | Refills: 0 | Status: SHIPPED | OUTPATIENT
Start: 2021-10-23 | End: 2023-07-06

## 2021-10-23 RX ORDER — HYDROMORPHONE HYDROCHLORIDE 1 MG/ML
2 INJECTION, SOLUTION INTRAMUSCULAR; INTRAVENOUS; SUBCUTANEOUS ONCE
Status: COMPLETED | OUTPATIENT
Start: 2021-10-23 | End: 2021-10-23

## 2021-10-23 RX ORDER — CELECOXIB 200 MG/1
200 CAPSULE ORAL 2 TIMES DAILY
Qty: 30 CAPSULE | Refills: 0 | Status: SHIPPED | OUTPATIENT
Start: 2021-10-23 | End: 2021-10-23 | Stop reason: SDUPTHER

## 2021-10-23 RX ADMIN — HYDROMORPHONE HYDROCHLORIDE 0.5 MG: 1 INJECTION, SOLUTION INTRAMUSCULAR; INTRAVENOUS; SUBCUTANEOUS at 02:10

## 2021-10-23 RX ADMIN — ONDANSETRON 4 MG: 2 INJECTION INTRAMUSCULAR; INTRAVENOUS at 01:10

## 2021-10-23 RX ADMIN — MORPHINE SULFATE 4 MG: 4 INJECTION INTRAVENOUS at 02:10

## 2021-10-23 RX ADMIN — HYDROMORPHONE HYDROCHLORIDE 2 MG: 1 INJECTION, SOLUTION INTRAMUSCULAR; INTRAVENOUS; SUBCUTANEOUS at 05:10

## 2021-10-25 ENCOUNTER — TELEPHONE (OUTPATIENT)
Dept: ORTHOPEDICS | Facility: CLINIC | Age: 61
End: 2021-10-25
Payer: COMMERCIAL

## 2021-11-01 ENCOUNTER — OFFICE VISIT (OUTPATIENT)
Dept: ORTHOPEDICS | Facility: CLINIC | Age: 61
End: 2021-11-01
Payer: COMMERCIAL

## 2021-11-01 DIAGNOSIS — S92.011A DISPLACED FRACTURE OF BODY OF RIGHT CALCANEUS, INITIAL ENCOUNTER FOR CLOSED FRACTURE: Primary | ICD-10-CM

## 2021-11-01 PROCEDURE — 99204 OFFICE O/P NEW MOD 45 MIN: CPT | Mod: S$GLB,,, | Performed by: ORTHOPAEDIC SURGERY

## 2021-11-01 PROCEDURE — 99999 PR PBB SHADOW E&M-EST. PATIENT-LVL II: CPT | Mod: PBBFAC,,, | Performed by: ORTHOPAEDIC SURGERY

## 2021-11-01 PROCEDURE — 99999 PR PBB SHADOW E&M-EST. PATIENT-LVL II: ICD-10-PCS | Mod: PBBFAC,,, | Performed by: ORTHOPAEDIC SURGERY

## 2021-11-01 PROCEDURE — 99204 PR OFFICE/OUTPT VISIT, NEW, LEVL IV, 45-59 MIN: ICD-10-PCS | Mod: S$GLB,,, | Performed by: ORTHOPAEDIC SURGERY

## 2021-11-01 RX ORDER — CEFAZOLIN SODIUM 2 G/50ML
2 SOLUTION INTRAVENOUS
Status: CANCELLED | OUTPATIENT
Start: 2021-11-01

## 2021-11-01 RX ORDER — SODIUM CHLORIDE 9 MG/ML
INJECTION, SOLUTION INTRAVENOUS CONTINUOUS
Status: CANCELLED | OUTPATIENT
Start: 2021-11-01

## 2021-11-01 RX ORDER — MUPIROCIN 20 MG/G
OINTMENT TOPICAL
Status: CANCELLED | OUTPATIENT
Start: 2021-11-01

## 2021-11-02 ENCOUNTER — ANESTHESIA EVENT (OUTPATIENT)
Dept: SURGERY | Facility: HOSPITAL | Age: 61
End: 2021-11-02
Payer: COMMERCIAL

## 2021-11-04 ENCOUNTER — TELEPHONE (OUTPATIENT)
Dept: ORTHOPEDICS | Facility: CLINIC | Age: 61
End: 2021-11-04
Payer: COMMERCIAL

## 2021-11-05 ENCOUNTER — HOSPITAL ENCOUNTER (OUTPATIENT)
Facility: HOSPITAL | Age: 61
Discharge: HOME | End: 2021-11-05
Attending: ORTHOPAEDIC SURGERY | Admitting: ORTHOPAEDIC SURGERY
Payer: COMMERCIAL

## 2021-11-05 ENCOUNTER — PATIENT MESSAGE (OUTPATIENT)
Dept: SURGERY | Facility: HOSPITAL | Age: 61
End: 2021-11-05
Payer: COMMERCIAL

## 2021-11-05 ENCOUNTER — ANESTHESIA (OUTPATIENT)
Dept: SURGERY | Facility: HOSPITAL | Age: 61
End: 2021-11-05
Payer: COMMERCIAL

## 2021-11-05 VITALS
TEMPERATURE: 98 F | BODY MASS INDEX: 31.82 KG/M2 | HEART RATE: 95 BPM | WEIGHT: 198 LBS | SYSTOLIC BLOOD PRESSURE: 132 MMHG | OXYGEN SATURATION: 95 % | DIASTOLIC BLOOD PRESSURE: 73 MMHG | RESPIRATION RATE: 16 BRPM | HEIGHT: 66 IN

## 2021-11-05 DIAGNOSIS — S92.041A CLOSED DISPLACED FRACTURE OF TUBEROSITY OF RIGHT CALCANEUS, UNSPECIFIED FRACTURE MORPHOLOGY, INITIAL ENCOUNTER: ICD-10-CM

## 2021-11-05 PROCEDURE — 76942 ADDUCTOR CANAL SINGLE INJECTION BLOCK: ICD-10-PCS | Mod: 26,,, | Performed by: ANESTHESIOLOGY

## 2021-11-05 PROCEDURE — 36000708 HC OR TIME LEV III 1ST 15 MIN: Performed by: ORTHOPAEDIC SURGERY

## 2021-11-05 PROCEDURE — 94761 N-INVAS EAR/PLS OXIMETRY MLT: CPT

## 2021-11-05 PROCEDURE — D9220A PRA ANESTHESIA: ICD-10-PCS | Mod: CRNA,,, | Performed by: NURSE ANESTHETIST, CERTIFIED REGISTERED

## 2021-11-05 PROCEDURE — C1713 ANCHOR/SCREW BN/BN,TIS/BN: HCPCS | Performed by: ORTHOPAEDIC SURGERY

## 2021-11-05 PROCEDURE — 64447 ADDUCTOR CANAL SINGLE INJECTION BLOCK: ICD-10-PCS | Mod: 59,RT,, | Performed by: ANESTHESIOLOGY

## 2021-11-05 PROCEDURE — 28119 REMOVAL OF HEEL SPUR: CPT | Mod: 51,RT,, | Performed by: ORTHOPAEDIC SURGERY

## 2021-11-05 PROCEDURE — 37000008 HC ANESTHESIA 1ST 15 MINUTES: Performed by: ORTHOPAEDIC SURGERY

## 2021-11-05 PROCEDURE — 28415 PR OPEN TREATMENT CALCANEAL FRACTURE: ICD-10-PCS | Mod: RT,,, | Performed by: ORTHOPAEDIC SURGERY

## 2021-11-05 PROCEDURE — D9220A PRA ANESTHESIA: Mod: CRNA,,, | Performed by: NURSE ANESTHETIST, CERTIFIED REGISTERED

## 2021-11-05 PROCEDURE — C1751 CATH, INF, PER/CENT/MIDLINE: HCPCS | Performed by: ANESTHESIOLOGY

## 2021-11-05 PROCEDURE — 36000709 HC OR TIME LEV III EA ADD 15 MIN: Performed by: ORTHOPAEDIC SURGERY

## 2021-11-05 PROCEDURE — 25000003 PHARM REV CODE 250: Performed by: STUDENT IN AN ORGANIZED HEALTH CARE EDUCATION/TRAINING PROGRAM

## 2021-11-05 PROCEDURE — 28119 PR REMOVAL OF HEEL SPUR: ICD-10-PCS | Mod: 51,RT,, | Performed by: ORTHOPAEDIC SURGERY

## 2021-11-05 PROCEDURE — 63600175 PHARM REV CODE 636 W HCPCS: Performed by: SURGERY

## 2021-11-05 PROCEDURE — 63600175 PHARM REV CODE 636 W HCPCS: Performed by: STUDENT IN AN ORGANIZED HEALTH CARE EDUCATION/TRAINING PROGRAM

## 2021-11-05 PROCEDURE — 76942 ECHO GUIDE FOR BIOPSY: CPT | Mod: 59 | Performed by: ANESTHESIOLOGY

## 2021-11-05 PROCEDURE — C1769 GUIDE WIRE: HCPCS | Performed by: ORTHOPAEDIC SURGERY

## 2021-11-05 PROCEDURE — 37000009 HC ANESTHESIA EA ADD 15 MINS: Performed by: ORTHOPAEDIC SURGERY

## 2021-11-05 PROCEDURE — 71000033 HC RECOVERY, INTIAL HOUR: Performed by: ORTHOPAEDIC SURGERY

## 2021-11-05 PROCEDURE — D9220A PRA ANESTHESIA: ICD-10-PCS | Mod: ANES,,, | Performed by: ANESTHESIOLOGY

## 2021-11-05 PROCEDURE — 64447 NJX AA&/STRD FEMORAL NRV IMG: CPT | Mod: 59,RT,, | Performed by: ANESTHESIOLOGY

## 2021-11-05 PROCEDURE — 25000003 PHARM REV CODE 250: Performed by: ANESTHESIOLOGY

## 2021-11-05 PROCEDURE — D9220A PRA ANESTHESIA: Mod: ANES,,, | Performed by: ANESTHESIOLOGY

## 2021-11-05 PROCEDURE — 71000015 HC POSTOP RECOV 1ST HR: Performed by: ORTHOPAEDIC SURGERY

## 2021-11-05 PROCEDURE — 64450 NJX AA&/STRD OTHER PN/BRANCH: CPT | Mod: 59,RT,, | Performed by: ANESTHESIOLOGY

## 2021-11-05 PROCEDURE — 71000016 HC POSTOP RECOV ADDL HR: Performed by: ORTHOPAEDIC SURGERY

## 2021-11-05 PROCEDURE — 27201423 OPTIME MED/SURG SUP & DEVICES STERILE SUPPLY: Performed by: ORTHOPAEDIC SURGERY

## 2021-11-05 PROCEDURE — 28415 OPTX CALCANEAL FRACTURE: CPT | Mod: RT,,, | Performed by: ORTHOPAEDIC SURGERY

## 2021-11-05 PROCEDURE — 63600175 PHARM REV CODE 636 W HCPCS: Performed by: NURSE ANESTHETIST, CERTIFIED REGISTERED

## 2021-11-05 PROCEDURE — 76942 ECHO GUIDE FOR BIOPSY: CPT | Performed by: ANESTHESIOLOGY

## 2021-11-05 PROCEDURE — 64450 POPLITEAL SCIATIC NERVE CATHETER: ICD-10-PCS | Mod: 59,RT,, | Performed by: ANESTHESIOLOGY

## 2021-11-05 PROCEDURE — 25000003 PHARM REV CODE 250: Performed by: NURSE ANESTHETIST, CERTIFIED REGISTERED

## 2021-11-05 RX ORDER — OXYCODONE HYDROCHLORIDE 5 MG/1
5 TABLET ORAL EVERY 4 HOURS PRN
Status: DISCONTINUED | OUTPATIENT
Start: 2021-11-05 | End: 2021-11-05 | Stop reason: HOSPADM

## 2021-11-05 RX ORDER — METHOCARBAMOL 750 MG/1
750 TABLET, FILM COATED ORAL 3 TIMES DAILY
Qty: 21 TABLET | Refills: 0 | Status: SHIPPED | OUTPATIENT
Start: 2021-11-05 | End: 2021-11-12

## 2021-11-05 RX ORDER — ROPIVACAINE HYDROCHLORIDE 2 MG/ML
INJECTION, SOLUTION EPIDURAL; INFILTRATION; PERINEURAL
Status: DISCONTINUED
Start: 2021-11-05 | End: 2021-11-05 | Stop reason: HOSPADM

## 2021-11-05 RX ORDER — ONDANSETRON 2 MG/ML
4 INJECTION INTRAMUSCULAR; INTRAVENOUS EVERY 12 HOURS PRN
Status: DISCONTINUED | OUTPATIENT
Start: 2021-11-05 | End: 2021-11-05 | Stop reason: HOSPADM

## 2021-11-05 RX ORDER — SODIUM CHLORIDE 9 MG/ML
INJECTION, SOLUTION INTRAVENOUS CONTINUOUS
Status: DISCONTINUED | OUTPATIENT
Start: 2021-11-05 | End: 2021-11-05 | Stop reason: HOSPADM

## 2021-11-05 RX ORDER — PROPOFOL 10 MG/ML
VIAL (ML) INTRAVENOUS
Status: DISCONTINUED | OUTPATIENT
Start: 2021-11-05 | End: 2021-11-05

## 2021-11-05 RX ORDER — BUPIVACAINE HYDROCHLORIDE 2.5 MG/ML
INJECTION, SOLUTION EPIDURAL; INFILTRATION; INTRACAUDAL
Status: COMPLETED | OUTPATIENT
Start: 2021-11-05 | End: 2021-11-05

## 2021-11-05 RX ORDER — SODIUM CHLORIDE 0.9 % (FLUSH) 0.9 %
3 SYRINGE (ML) INJECTION
Status: DISCONTINUED | OUTPATIENT
Start: 2021-11-05 | End: 2021-11-05 | Stop reason: HOSPADM

## 2021-11-05 RX ORDER — ONDANSETRON 2 MG/ML
INJECTION INTRAMUSCULAR; INTRAVENOUS
Status: DISCONTINUED | OUTPATIENT
Start: 2021-11-05 | End: 2021-11-05

## 2021-11-05 RX ORDER — METOCLOPRAMIDE HYDROCHLORIDE 5 MG/ML
5 INJECTION INTRAMUSCULAR; INTRAVENOUS EVERY 6 HOURS PRN
Status: DISCONTINUED | OUTPATIENT
Start: 2021-11-05 | End: 2021-11-05 | Stop reason: HOSPADM

## 2021-11-05 RX ORDER — DEXAMETHASONE SODIUM PHOSPHATE 4 MG/ML
INJECTION, SOLUTION INTRA-ARTICULAR; INTRALESIONAL; INTRAMUSCULAR; INTRAVENOUS; SOFT TISSUE
Status: DISCONTINUED | OUTPATIENT
Start: 2021-11-05 | End: 2021-11-05

## 2021-11-05 RX ORDER — OXYCODONE HYDROCHLORIDE 5 MG/1
10 TABLET ORAL EVERY 4 HOURS PRN
Status: DISCONTINUED | OUTPATIENT
Start: 2021-11-05 | End: 2021-11-05 | Stop reason: HOSPADM

## 2021-11-05 RX ORDER — ROCURONIUM BROMIDE 10 MG/ML
INJECTION, SOLUTION INTRAVENOUS
Status: DISCONTINUED | OUTPATIENT
Start: 2021-11-05 | End: 2021-11-05

## 2021-11-05 RX ORDER — PHENYLEPHRINE HCL IN 0.9% NACL 1 MG/10 ML
SYRINGE (ML) INTRAVENOUS
Status: DISCONTINUED | OUTPATIENT
Start: 2021-11-05 | End: 2021-11-05

## 2021-11-05 RX ORDER — EPHEDRINE SULFATE 50 MG/ML
INJECTION, SOLUTION INTRAVENOUS
Status: DISCONTINUED | OUTPATIENT
Start: 2021-11-05 | End: 2021-11-05

## 2021-11-05 RX ORDER — SODIUM CHLORIDE 0.9 % (FLUSH) 0.9 %
10 SYRINGE (ML) INJECTION
Status: DISCONTINUED | OUTPATIENT
Start: 2021-11-05 | End: 2021-11-05 | Stop reason: HOSPADM

## 2021-11-05 RX ORDER — ROPIVACAINE HYDROCHLORIDE 2 MG/ML
INJECTION, SOLUTION EPIDURAL; INFILTRATION; PERINEURAL CONTINUOUS
Status: DISCONTINUED | OUTPATIENT
Start: 2021-11-05 | End: 2021-11-05 | Stop reason: HOSPADM

## 2021-11-05 RX ORDER — FENTANYL CITRATE 50 UG/ML
INJECTION, SOLUTION INTRAMUSCULAR; INTRAVENOUS
Status: DISCONTINUED | OUTPATIENT
Start: 2021-11-05 | End: 2021-11-05

## 2021-11-05 RX ORDER — CEFAZOLIN SODIUM 1 G/3ML
2 INJECTION, POWDER, FOR SOLUTION INTRAMUSCULAR; INTRAVENOUS
Status: COMPLETED | OUTPATIENT
Start: 2021-11-05 | End: 2021-11-05

## 2021-11-05 RX ORDER — HYDROCODONE BITARTRATE AND ACETAMINOPHEN 10; 325 MG/1; MG/1
1 TABLET ORAL EVERY 4 HOURS PRN
Qty: 42 TABLET | Refills: 0 | Status: SHIPPED | OUTPATIENT
Start: 2021-11-05 | End: 2021-12-15

## 2021-11-05 RX ORDER — FENTANYL CITRATE 50 UG/ML
25-200 INJECTION, SOLUTION INTRAMUSCULAR; INTRAVENOUS
Status: DISCONTINUED | OUTPATIENT
Start: 2021-11-05 | End: 2021-11-05 | Stop reason: HOSPADM

## 2021-11-05 RX ORDER — NEOSTIGMINE METHYLSULFATE 0.5 MG/ML
INJECTION, SOLUTION INTRAVENOUS
Status: DISCONTINUED | OUTPATIENT
Start: 2021-11-05 | End: 2021-11-05

## 2021-11-05 RX ORDER — GABAPENTIN 100 MG/1
100 CAPSULE ORAL 3 TIMES DAILY
Qty: 21 CAPSULE | Refills: 0 | Status: SHIPPED | OUTPATIENT
Start: 2021-11-05 | End: 2021-12-15

## 2021-11-05 RX ORDER — MUPIROCIN 20 MG/G
OINTMENT TOPICAL
Status: DISCONTINUED | OUTPATIENT
Start: 2021-11-05 | End: 2021-11-05 | Stop reason: HOSPADM

## 2021-11-05 RX ORDER — LIDOCAINE HYDROCHLORIDE 20 MG/ML
INJECTION INTRAVENOUS
Status: DISCONTINUED | OUTPATIENT
Start: 2021-11-05 | End: 2021-11-05

## 2021-11-05 RX ORDER — MIDAZOLAM HYDROCHLORIDE 1 MG/ML
.5-4 INJECTION INTRAMUSCULAR; INTRAVENOUS
Status: DISCONTINUED | OUTPATIENT
Start: 2021-11-05 | End: 2021-11-05 | Stop reason: HOSPADM

## 2021-11-05 RX ORDER — ACETAMINOPHEN 325 MG/1
650 TABLET ORAL EVERY 6 HOURS
Status: DISCONTINUED | OUTPATIENT
Start: 2021-11-05 | End: 2021-11-05 | Stop reason: HOSPADM

## 2021-11-05 RX ADMIN — EPHEDRINE SULFATE 10 MG: 50 INJECTION INTRAVENOUS at 07:11

## 2021-11-05 RX ADMIN — PROPOFOL 200 MG: 10 INJECTION, EMULSION INTRAVENOUS at 07:11

## 2021-11-05 RX ADMIN — ROCURONIUM BROMIDE 50 MG: 10 INJECTION, SOLUTION INTRAVENOUS at 07:11

## 2021-11-05 RX ADMIN — SODIUM CHLORIDE: 0.9 INJECTION, SOLUTION INTRAVENOUS at 06:11

## 2021-11-05 RX ADMIN — FENTANYL CITRATE 100 MCG: 50 INJECTION, SOLUTION INTRAMUSCULAR; INTRAVENOUS at 07:11

## 2021-11-05 RX ADMIN — MUPIROCIN: 20 OINTMENT TOPICAL at 06:11

## 2021-11-05 RX ADMIN — MIDAZOLAM 2 MG: 1 INJECTION INTRAMUSCULAR; INTRAVENOUS at 06:11

## 2021-11-05 RX ADMIN — FENTANYL CITRATE 100 MCG: 50 INJECTION INTRAMUSCULAR; INTRAVENOUS at 06:11

## 2021-11-05 RX ADMIN — DEXAMETHASONE SODIUM PHOSPHATE 8 MG: 4 INJECTION, SOLUTION INTRAMUSCULAR; INTRAVENOUS at 07:11

## 2021-11-05 RX ADMIN — CEFAZOLIN 2 G: 330 INJECTION, POWDER, FOR SOLUTION INTRAMUSCULAR; INTRAVENOUS at 07:11

## 2021-11-05 RX ADMIN — BUPIVACAINE HYDROCHLORIDE 15 ML: 2.5 INJECTION, SOLUTION EPIDURAL; INFILTRATION; INTRACAUDAL at 06:11

## 2021-11-05 RX ADMIN — NEOSTIGMINE METHYLSULFATE 4 MG: 0.5 INJECTION INTRAVENOUS at 10:11

## 2021-11-05 RX ADMIN — GLYCOPYRROLATE 0.4 MG: 0.2 INJECTION, SOLUTION INTRAMUSCULAR; INTRAVITREAL at 10:11

## 2021-11-05 RX ADMIN — Medication: at 10:11

## 2021-11-05 RX ADMIN — Medication 100 MCG: at 07:11

## 2021-11-05 RX ADMIN — LIDOCAINE HYDROCHLORIDE 100 MG: 20 INJECTION, SOLUTION INTRAVENOUS at 07:11

## 2021-11-05 RX ADMIN — BUPIVACAINE HYDROCHLORIDE 30 ML: 2.5 INJECTION, SOLUTION EPIDURAL; INFILTRATION; INTRACAUDAL; PERINEURAL at 06:11

## 2021-11-05 RX ADMIN — ACETAMINOPHEN 650 MG: 325 TABLET ORAL at 11:11

## 2021-11-05 RX ADMIN — ONDANSETRON 4 MG: 2 INJECTION INTRAMUSCULAR; INTRAVENOUS at 09:11

## 2021-11-10 ENCOUNTER — PATIENT MESSAGE (OUTPATIENT)
Dept: ORTHOPEDICS | Facility: CLINIC | Age: 61
End: 2021-11-10
Payer: COMMERCIAL

## 2021-11-18 ENCOUNTER — OFFICE VISIT (OUTPATIENT)
Dept: ORTHOPEDICS | Facility: CLINIC | Age: 61
End: 2021-11-18
Payer: COMMERCIAL

## 2021-11-18 ENCOUNTER — HOSPITAL ENCOUNTER (OUTPATIENT)
Dept: RADIOLOGY | Facility: HOSPITAL | Age: 61
Discharge: HOME | End: 2021-11-18
Attending: PHYSICIAN ASSISTANT
Payer: COMMERCIAL

## 2021-11-18 VITALS — BODY MASS INDEX: 31.96 KG/M2 | HEIGHT: 66 IN

## 2021-11-18 DIAGNOSIS — Z09 FOLLOW-UP EXAMINATION AFTER ORTHOPEDIC SURGERY: ICD-10-CM

## 2021-11-18 DIAGNOSIS — M79.671 PAIN OF RIGHT HEEL: ICD-10-CM

## 2021-11-18 DIAGNOSIS — S92.011D CLOSED DISPLACED FRACTURE OF BODY OF RIGHT CALCANEUS WITH ROUTINE HEALING, SUBSEQUENT ENCOUNTER: Primary | ICD-10-CM

## 2021-11-18 PROCEDURE — 1160F RVW MEDS BY RX/DR IN RCRD: CPT | Mod: CPTII,S$GLB,, | Performed by: PHYSICIAN ASSISTANT

## 2021-11-18 PROCEDURE — 73650 XR CALCANEUS 2 VIEW RIGHT: ICD-10-PCS | Mod: 26,RT,, | Performed by: RADIOLOGY

## 2021-11-18 PROCEDURE — 99024 PR POST-OP FOLLOW-UP VISIT: ICD-10-PCS | Mod: S$GLB,,, | Performed by: PHYSICIAN ASSISTANT

## 2021-11-18 PROCEDURE — 99999 PR PBB SHADOW E&M-EST. PATIENT-LVL III: CPT | Mod: PBBFAC,,, | Performed by: PHYSICIAN ASSISTANT

## 2021-11-18 PROCEDURE — 1160F PR REVIEW ALL MEDS BY PRESCRIBER/CLIN PHARMACIST DOCUMENTED: ICD-10-PCS | Mod: CPTII,S$GLB,, | Performed by: PHYSICIAN ASSISTANT

## 2021-11-18 PROCEDURE — 1159F MED LIST DOCD IN RCRD: CPT | Mod: CPTII,S$GLB,, | Performed by: PHYSICIAN ASSISTANT

## 2021-11-18 PROCEDURE — 99999 PR PBB SHADOW E&M-EST. PATIENT-LVL III: ICD-10-PCS | Mod: PBBFAC,,, | Performed by: PHYSICIAN ASSISTANT

## 2021-11-18 PROCEDURE — 99024 POSTOP FOLLOW-UP VISIT: CPT | Mod: S$GLB,,, | Performed by: PHYSICIAN ASSISTANT

## 2021-11-18 PROCEDURE — 73650 X-RAY EXAM OF HEEL: CPT | Mod: 26,RT,, | Performed by: RADIOLOGY

## 2021-11-18 PROCEDURE — 73650 X-RAY EXAM OF HEEL: CPT | Mod: TC,RT

## 2021-11-18 PROCEDURE — 3008F PR BODY MASS INDEX (BMI) DOCUMENTED: ICD-10-PCS | Mod: CPTII,S$GLB,, | Performed by: PHYSICIAN ASSISTANT

## 2021-11-18 PROCEDURE — 1159F PR MEDICATION LIST DOCUMENTED IN MEDICAL RECORD: ICD-10-PCS | Mod: CPTII,S$GLB,, | Performed by: PHYSICIAN ASSISTANT

## 2021-11-18 PROCEDURE — 3008F BODY MASS INDEX DOCD: CPT | Mod: CPTII,S$GLB,, | Performed by: PHYSICIAN ASSISTANT

## 2021-12-14 ENCOUNTER — PATIENT MESSAGE (OUTPATIENT)
Dept: SLEEP MEDICINE | Facility: CLINIC | Age: 61
End: 2021-12-14
Payer: COMMERCIAL

## 2021-12-14 ENCOUNTER — TELEPHONE (OUTPATIENT)
Dept: SLEEP MEDICINE | Facility: CLINIC | Age: 61
End: 2021-12-14
Payer: COMMERCIAL

## 2021-12-15 ENCOUNTER — OFFICE VISIT (OUTPATIENT)
Dept: SLEEP MEDICINE | Facility: CLINIC | Age: 61
End: 2021-12-15
Payer: COMMERCIAL

## 2021-12-15 ENCOUNTER — PATIENT MESSAGE (OUTPATIENT)
Dept: SLEEP MEDICINE | Facility: CLINIC | Age: 61
End: 2021-12-15

## 2021-12-15 DIAGNOSIS — S92.011D CLOSED DISPLACED FRACTURE OF BODY OF RIGHT CALCANEUS WITH ROUTINE HEALING, SUBSEQUENT ENCOUNTER: Primary | ICD-10-CM

## 2021-12-15 DIAGNOSIS — G47.33 OSA (OBSTRUCTIVE SLEEP APNEA): Primary | ICD-10-CM

## 2021-12-15 PROCEDURE — 99203 PR OFFICE/OUTPT VISIT, NEW, LEVL III, 30-44 MIN: ICD-10-PCS | Mod: 95,,, | Performed by: INTERNAL MEDICINE

## 2021-12-15 PROCEDURE — 99203 OFFICE O/P NEW LOW 30 MIN: CPT | Mod: 95,,, | Performed by: INTERNAL MEDICINE

## 2021-12-20 ENCOUNTER — HOSPITAL ENCOUNTER (OUTPATIENT)
Dept: RADIOLOGY | Facility: HOSPITAL | Age: 61
Discharge: HOME | End: 2021-12-20
Attending: ORTHOPAEDIC SURGERY
Payer: COMMERCIAL

## 2021-12-20 ENCOUNTER — OFFICE VISIT (OUTPATIENT)
Dept: ORTHOPEDICS | Facility: CLINIC | Age: 61
End: 2021-12-20
Payer: COMMERCIAL

## 2021-12-20 DIAGNOSIS — S92.011D CLOSED DISPLACED FRACTURE OF BODY OF RIGHT CALCANEUS WITH ROUTINE HEALING, SUBSEQUENT ENCOUNTER: Primary | ICD-10-CM

## 2021-12-20 DIAGNOSIS — S92.011D CLOSED DISPLACED FRACTURE OF BODY OF RIGHT CALCANEUS WITH ROUTINE HEALING, SUBSEQUENT ENCOUNTER: ICD-10-CM

## 2021-12-20 PROCEDURE — 99024 PR POST-OP FOLLOW-UP VISIT: ICD-10-PCS | Mod: S$GLB,,, | Performed by: ORTHOPAEDIC SURGERY

## 2021-12-20 PROCEDURE — 99999 PR PBB SHADOW E&M-EST. PATIENT-LVL II: ICD-10-PCS | Mod: PBBFAC,,, | Performed by: ORTHOPAEDIC SURGERY

## 2021-12-20 PROCEDURE — 99024 POSTOP FOLLOW-UP VISIT: CPT | Mod: S$GLB,,, | Performed by: ORTHOPAEDIC SURGERY

## 2021-12-20 PROCEDURE — 99999 PR PBB SHADOW E&M-EST. PATIENT-LVL II: CPT | Mod: PBBFAC,,, | Performed by: ORTHOPAEDIC SURGERY

## 2021-12-20 PROCEDURE — 73650 XR CALCANEUS 2 VIEW RIGHT: ICD-10-PCS | Mod: 26,RT,, | Performed by: RADIOLOGY

## 2021-12-20 PROCEDURE — 73650 X-RAY EXAM OF HEEL: CPT | Mod: 26,RT,, | Performed by: RADIOLOGY

## 2021-12-20 PROCEDURE — 73650 X-RAY EXAM OF HEEL: CPT | Mod: TC,RT

## 2021-12-21 ENCOUNTER — CLINICAL SUPPORT (OUTPATIENT)
Dept: REHABILITATION | Facility: HOSPITAL | Age: 61
End: 2021-12-21
Payer: COMMERCIAL

## 2021-12-21 DIAGNOSIS — M25.671 DECREASED RANGE OF MOTION OF RIGHT ANKLE: ICD-10-CM

## 2021-12-21 DIAGNOSIS — S92.011D CLOSED DISPLACED FRACTURE OF BODY OF RIGHT CALCANEUS WITH ROUTINE HEALING, SUBSEQUENT ENCOUNTER: ICD-10-CM

## 2021-12-21 DIAGNOSIS — R53.1 DECREASED STRENGTH: ICD-10-CM

## 2021-12-21 PROCEDURE — 97110 THERAPEUTIC EXERCISES: CPT | Mod: PN

## 2021-12-21 PROCEDURE — 97161 PT EVAL LOW COMPLEX 20 MIN: CPT | Mod: PN

## 2021-12-30 PROBLEM — R53.1 DECREASED STRENGTH: Status: ACTIVE | Noted: 2021-12-30

## 2021-12-30 PROBLEM — S92.011D CLOSED DISPLACED FRACTURE OF BODY OF RIGHT CALCANEUS WITH ROUTINE HEALING: Status: ACTIVE | Noted: 2021-12-30

## 2021-12-30 PROBLEM — M25.673 DECREASED ROM OF ANKLE: Status: ACTIVE | Noted: 2021-12-30

## 2022-01-05 ENCOUNTER — CLINICAL SUPPORT (OUTPATIENT)
Dept: REHABILITATION | Facility: HOSPITAL | Age: 62
End: 2022-01-05
Payer: COMMERCIAL

## 2022-01-05 DIAGNOSIS — M25.671 DECREASED RANGE OF MOTION OF RIGHT ANKLE: ICD-10-CM

## 2022-01-05 DIAGNOSIS — R53.1 DECREASED STRENGTH: ICD-10-CM

## 2022-01-05 PROCEDURE — 97110 THERAPEUTIC EXERCISES: CPT | Mod: PN

## 2022-01-05 PROCEDURE — 97140 MANUAL THERAPY 1/> REGIONS: CPT | Mod: PN

## 2022-01-05 NOTE — PROGRESS NOTES
Physical Therapy Daily Treatment Note     Name: Pietro Dietz  Clinic Number: [id number]    Therapy Diagnosis:   Encounter Diagnoses   Name Primary?    Decreased range of motion of right ankle     Decreased strength      Physician: Nichole Ortiz MD    Visit Date: 1/5/2022    Physician Orders: PT Eval and Treat  Medical Diagnosis from Referral: S92.011D (ICD-10-CM) - Displaced fracture of body of right calcaneus, subsequent encounter for fracture with routine healing  Evaluation Date: 12/21/2021  Authorization Period Expiration: 12/31/2022  Plan of Care Expiration: 2/18/2022  Visit # / Visits authorized: 2/50  FOTO: 2/5  PTA Visit: 0/6     Time In: 1:00 PM  Time Out: 2:00 PM  Total Billable Time: 60 minutes (3 TE, 1 MT)     Precautions: Standard, GERD, HLD  · Weight bearing: Advance to weight bearing as tolerated in boot as pain allows  · Immobilization: Achilles postop boot and heel lift protocol to be followed  · Start date: 2 months postop: Surgery date: 11/5/2021  Rx/protocol and restrictions:   --Gait training, edema control/desensitization modalities  --WBAT RLE, immobilization: boot; may initiate boot weaning 3 months postop   --AROM/AAROM and gentle strengthening/theraband work, but no PROM/manipulation until 3 months postop    Subjective     Pt reports: doing well and has been waiting to put weight on his leg up until now. Using crutches.  He was compliant with home exercise program.  Response to previous treatment: improved ankle and toe mobility  Functional change: improved ankle and toe mobility    Pain: 0/10  Location: R heel    Objective     Pietro received the following manual therapy techniques: Joint mobilizations, Manual traction, Myofacial release, Manual Lymphatic Drainage, Soft tissue Mobilization and Friction Massage were applied to the: R ankle for 15 minutes, including:  R ankle PROM (avoiding DF)  R toes/forefoot stretching    Pietro received therapeutic exercises to develop strength,  endurance, ROM, flexibility, posture and core stabilization for 45 minutes including:  Towel toe curls: 5x  Ankle circles: 50x R, avoid dorsiflexion past neutral  Sidelying ankle eversion in ankle PF: 2x20 R  Sidelying ankle inversion in ankle PF: 2x20 R  Isometric ankle PF holds sitting: 10x20'' holds R    Foot taps: 2x10 B, 4 inch step  Step ups: 10x B, 4 inch step  Mini squats: 2x10 at bars    Home Exercises Provided and Patient Education Provided   Education provided:   - proper foot wear with arch support  - course of therapy, prognosis  - importance of HEP  - suggested EvenUp purchase to avoid further gait deviations and back pain    Written Home Exercises Provided: yes.  Exercises were reviewed and Pietro was able to demonstrate them prior to the end of the session.  Pietro demonstrated good  understanding of the education provided.     See EMR under Media for exercises provided 12/21/2021.    Assessment     Pt did very well and reported some mild soreness in his right heel after weight bearing activities. Gait is slowly normalizing with CAM boot and EVENup donned. Continued weight bearing progression and work on right ankle mobility pre precautions. Poor right ankle inversion/eversion and moderate amount of edema present.   Pietro Is progressing well towards his goals.   Pt prognosis is Excellent.     Pt will continue to benefit from skilled outpatient physical therapy to address the deficits listed in the problem list box on initial evaluation, provide pt/family education and to maximize pt's level of independence in the home and community environment.   Pt's spiritual, cultural and educational needs considered and pt agreeable to plan of care and goals.    Anticipated barriers to physical therapy: severity of calcaneal fracture     Goals:   GOALS: Short Term Goals:  4 weeks  1. Report decreased R ankle pain </= 1/10 with boot donned in walking to increase tolerance for ADLs. - progressing  2. Increase R  ankle inversion/eversion AROM by 10 degrees in order to aid in walking with min to no compensation. - progressing  3. Pt will demo good sitting and standing posture for improved spine and joint alignment for improved biomechanics. - progressing  4. Pt to tolerate HEP to improve ROM and independence with ADL's. - progressing     Long Term Goals: 8 weeks  1. Report decreased R ankle pain </= 2/10 with fair weight bearing without boot donned to increase tolerance for increased QoL and improved ADLs. - progressing  2. Patient goal: get back to swimming and bee keeping - progressing  3. Increase strength to >/= 4/5 for right ankle eversion/inversion/dorsiflexion to increase tolerance for ADL and work activities. - progressing  4. Pt will report at </= 37% impaired on ANKLE FOTO score to demo increased functional mobility.  - progressing  5. Pt will be able to ambulate community distances and negotiate stairs with minimal ankle pain for increased functional mobility and QoL. - progressing    Plan     Cont per POC and protocol.    Vinh Whelan, PT

## 2022-01-07 ENCOUNTER — CLINICAL SUPPORT (OUTPATIENT)
Dept: REHABILITATION | Facility: HOSPITAL | Age: 62
End: 2022-01-07
Payer: COMMERCIAL

## 2022-01-07 DIAGNOSIS — R53.1 DECREASED STRENGTH: ICD-10-CM

## 2022-01-07 DIAGNOSIS — M25.671 DECREASED RANGE OF MOTION OF RIGHT ANKLE: ICD-10-CM

## 2022-01-07 PROCEDURE — 97110 THERAPEUTIC EXERCISES: CPT | Mod: PN | Performed by: PHYSICAL THERAPIST

## 2022-01-07 NOTE — PROGRESS NOTES
"  Physical Therapy Daily Treatment Note     Name: Pietro Dietz  Clinic Number: [id number]    Therapy Diagnosis:   Encounter Diagnoses   Name Primary?    Decreased range of motion of right ankle     Decreased strength      Physician: Nichole Ortiz MD    Visit Date: 1/7/2022    Physician Orders: PT Eval and Treat  Medical Diagnosis from Referral: S92.011D (ICD-10-CM) - Displaced fracture of body of right calcaneus, subsequent encounter for fracture with routine healing  Evaluation Date: 12/21/2021  Authorization Period Expiration: 12/31/2022  Plan of Care Expiration: 2/18/2022  Visit # / Visits authorized: 3/50  FOTO: 3/5  PTA Visit: 0/6     Time In: 11:30 PM  Time Out: 12:30 PM  Total Billable Time: 54 minutes (TEx4)     Precautions: Standard, GERD, HLD  · Weight bearing: Advance to weight bearing as tolerated in boot as pain allows  · Immobilization: Achilles postop boot and heel lift protocol to be followed  · Start date: 2 months postop: Surgery date: 11/5/2021  Rx/protocol and restrictions:   --Gait training, edema control/desensitization modalities  --WBAT RLE, immobilization: boot; may initiate boot weaning 3 months postop   --AROM/AAROM and gentle strengthening/theraband work, but no PROM/manipulation until 3 months postop    Subjective     Pt reports: no pain and weight bearing more  He was compliant with home exercise program.  Response to previous treatment: good, ongoing  Functional change: weight bearing more    Pain: 0/10  Location: R heel    Objective       Pietro received therapeutic exercises to develop strength, endurance, ROM, flexibility and posture for 54 minutes including:    Ankle circles: 50x R, avoid dorsiflexion past neutral  Sidelying ankle eversion in ankle PF: 2x20 R  Sidelying ankle inversion in ankle PF: 2x20 R  Isometric ankle PF holds sitting: 10x20'' holds R  SLR: 30x R  Hip Abd SLR: 3x10 R  Hip ext SLR: 3x10 R  Seated DF to neutral: 2x10 R    Step ups: 30x 4" step  Standing hip " abd: 3x10 each  Squats: 30x      Home Exercises Provided and Patient Education Provided   Education provided:   - use of CAM boot  - importance of hip strength    Written Home Exercises Provided: Patient instructed to cont prior HEP.  Exercises were reviewed and Pietro was able to demonstrate them prior to the end of the session.  Pietro demonstrated good  understanding of the education provided.     See EMR under Patient Instructions for exercises provided on evaluation    Assessment     Pietro is a 61 y.o. male referred to outpatient Physical Therapy with a medical diagnosis of Displaced fracture of body of right calcaneus, subsequent encounter for fracture with routine healing presenting to PT at [facility]. He received a R calcaneal ORIF on 11/5/2021, and is WBAT with CAM boot donned for 6 weeks until follow-up on 2/3/2021.   Hip weakness seen with 3 way SLR today, fatiguing w/o any weight prior to 30 reps. Continue to initiate closed chain exercises. He performs full session w/o any pain and reporting feeling better.    Pietro Is progressing well towards his goals.   Pt prognosis is Excellent.     Pt will continue to benefit from skilled outpatient physical therapy to address the deficits listed in the problem list box on initial evaluation, provide pt/family education and to maximize pt's level of independence in the home and community environment.   Pt's spiritual, cultural and educational needs considered and pt agreeable to plan of care and goals.    Anticipated barriers to physical therapy: severity of calcaneal fracture        Goals:   Short Term Goals:  4 weeks  1. Report decreased R ankle pain </= 1/10 with boot donned in walking to increase tolerance for ADLs. PROGRESSING; NOT MET  2. Increase R ankle inversion/eversion AROM by 10 degrees in order to aid in walking with min to no compensation. PROGRESSING; NOT MET  3. Pt will demo good sitting and standing posture for  improved spine and joint alignment for improved biomechanics. PROGRESSING; NOT MET  4. Pt to tolerate HEP to improve ROM and independence with ADL's. PROGRESSING; NOT MET     Long Term Goals: 8 weeks  1. Report decreased R ankle pain </= 2/10 with fair weight bearing without boot donned to increase tolerance for increased QoL and improved ADLs. PROGRESSING; NOT MET  2. Patient goal: get back to swimming and bee keeping. PROGRESSING; NOT MET  3. Increase strength to >/= 4/5 for right ankle eversion/inversion/dorsiflexion to increase tolerance for ADL and work activities. PROGRESSING; NOT MET  4. Pt will report at </= 37% impaired on ANKLE FOTO score to demo increased functional mobility. PROGRESSING; NOT MET   5. Pt will be able to ambulate community distances and negotiate stairs with minimal ankle pain for increased functional mobility and QoL. PROGRESSING; NOT MET      Plan     Cont per POC and protocol.  Plan of care Certification: 12/21/2021 to 2/18/2022.       Pietro Jerome, PT

## 2022-01-11 ENCOUNTER — CLINICAL SUPPORT (OUTPATIENT)
Dept: REHABILITATION | Facility: HOSPITAL | Age: 62
End: 2022-01-11
Payer: COMMERCIAL

## 2022-01-11 DIAGNOSIS — R53.1 DECREASED STRENGTH: ICD-10-CM

## 2022-01-11 DIAGNOSIS — M25.671 DECREASED RANGE OF MOTION OF RIGHT ANKLE: ICD-10-CM

## 2022-01-11 PROCEDURE — 97140 MANUAL THERAPY 1/> REGIONS: CPT | Mod: PN

## 2022-01-11 PROCEDURE — 97110 THERAPEUTIC EXERCISES: CPT | Mod: PN

## 2022-01-11 NOTE — PROGRESS NOTES
Physical Therapy Daily Treatment Note     Name: Pietro Dietz  Clinic Number: [id number]    Therapy Diagnosis:   Encounter Diagnoses   Name Primary?    Decreased range of motion of right ankle     Decreased strength      Physician: Nichole Ortiz MD    Visit Date: 1/11/2022    Physician Orders: PT Eval and Treat  Medical Diagnosis from Referral: S92.011D (ICD-10-CM) - Displaced fracture of body of right calcaneus, subsequent encounter for fracture with routine healing  Evaluation Date: 12/21/2021  Authorization Period Expiration: 12/31/2022  Plan of Care Expiration: 2/18/2022  Visit # / Visits authorized: 4/50  FOTO: 4/5  PTA Visit: 0/6     Time In: 1:01 PM  Time Out: 2:00 PM  Total Billable Time: 59 minutes (TEx3, MTx1)     Precautions: Standard, GERD, HLD  · Weight bearing: Advance to weight bearing as tolerated in boot as pain allows  · Immobilization: Achilles postop boot and heel lift protocol to be followed  · Start date: 2 months postop: Surgery date: 11/5/2021  Rx/protocol and restrictions:   --Gait training, edema control/desensitization modalities  --WBAT RLE, immobilization: boot; may initiate boot weaning 3 months postop   --AROM/AAROM and gentle strengthening/theraband work, but no PROM/manipulation until 3 months postop    Subjective     Pt reports: has been removing a single heel wedge every week on each Monday. Does have some low grade heel pain in right and left heel as base of thempain and weight bearing more.  He was compliant with home exercise program.  Response to previous treatment: good, ongoing  Functional change: weight bearing more    Pain: 0/10  Location: R heel    Objective     Pietro received therapeutic exercises to develop strength, endurance, ROM, flexibility and posture for 45 minutes including:    Ankle circles: 50x R, avoid dorsiflexion past neutral  Sidelying ankle eversion in ankle PF: 2x20 R, 1#  Sidelying ankle inversion in ankle PF: 2x20 R, 1#  Isometric ankle PF holds  "sitting: 10x20'' holds R  SLR: 30x R, 1.5 #  Hip Abd SLR: 3x10 R, 1.5 #  Hip ext SLR: 3x10 R, 1.5 #  Seated DF to neutral: 2x10 R    Foot taps: 20x B, 6 inch step  Step ups: 30x 4" step  Standing hip abd: 3x10 each  Squats: 30x    Pietro received manual therapy for 14 minutes consisting of:  Effleurage to RLE to decrease ankle edema    Home Exercises Provided and Patient Education Provided   Education provided:   - use of CAM boot  - importance of hip strength    Written Home Exercises Provided: Patient instructed to cont prior HEP.  Exercises were reviewed and Pietro was able to demonstrate them prior to the end of the session.  Pietro demonstrated good  understanding of the education provided.     See EMR under Patient Instructions for exercises provided on evaluation    Assessment     Pietro is a 61 y.o. male referred to outpatient Physical Therapy with a medical diagnosis of Displaced fracture of body of right calcaneus, subsequent encounter for fracture with routine healing presenting to PT at [facility]. He received a R calcaneal ORIF on 11/5/2021, and is WBAT with CAM boot donned for 6 weeks until follow-up on 2/3/2021.  Pt progressing well and reported mild soreness with weight bearing activities today. Following achilles repair protocol and CAM boot donned throughout all weight bearing activities.     Pietro Is progressing well towards his goals.   Pt prognosis is Excellent.     Pt will continue to benefit from skilled outpatient physical therapy to address the deficits listed in the problem list box on initial evaluation, provide pt/family education and to maximize pt's level of independence in the home and community environment.   Pt's spiritual, cultural and educational needs considered and pt agreeable to plan of care and goals.    Anticipated barriers to physical therapy: severity of calcaneal fracture      Goals:   Short Term Goals:  4 weeks  1. Report decreased R " ankle pain </= 1/10 with boot donned in walking to increase tolerance for ADLs. PROGRESSING; NOT MET  2. Increase R ankle inversion/eversion AROM by 10 degrees in order to aid in walking with min to no compensation. PROGRESSING; NOT MET  3. Pt will demo good sitting and standing posture for improved spine and joint alignment for improved biomechanics. PROGRESSING; NOT MET  4. Pt to tolerate HEP to improve ROM and independence with ADL's. PROGRESSING; NOT MET     Long Term Goals: 8 weeks  1. Report decreased R ankle pain </= 2/10 with fair weight bearing without boot donned to increase tolerance for increased QoL and improved ADLs. PROGRESSING; NOT MET  2. Patient goal: get back to swimming and bee keeping. PROGRESSING; NOT MET  3. Increase strength to >/= 4/5 for right ankle eversion/inversion/dorsiflexion to increase tolerance for ADL and work activities. PROGRESSING; NOT MET  4. Pt will report at </= 37% impaired on ANKLE FOTO score to demo increased functional mobility. PROGRESSING; NOT MET   5. Pt will be able to ambulate community distances and negotiate stairs with minimal ankle pain for increased functional mobility and QoL. PROGRESSING; NOT MET    Plan     Cont per POC and protocol.  Plan of care Certification: 12/21/2021 to 2/18/2022.     Vinh Whelan, PT

## 2022-01-14 ENCOUNTER — CLINICAL SUPPORT (OUTPATIENT)
Dept: REHABILITATION | Facility: HOSPITAL | Age: 62
End: 2022-01-14
Payer: COMMERCIAL

## 2022-01-14 DIAGNOSIS — R53.1 DECREASED STRENGTH: ICD-10-CM

## 2022-01-14 DIAGNOSIS — M25.671 DECREASED RANGE OF MOTION OF RIGHT ANKLE: ICD-10-CM

## 2022-01-14 PROCEDURE — 97110 THERAPEUTIC EXERCISES: CPT | Mod: PN | Performed by: PHYSICAL THERAPIST

## 2022-01-14 PROCEDURE — 97140 MANUAL THERAPY 1/> REGIONS: CPT | Mod: PN | Performed by: PHYSICAL THERAPIST

## 2022-01-14 NOTE — PROGRESS NOTES
"  Physical Therapy Daily Treatment Note     Name: Pietro Dietz  Clinic Number: [id number]    Therapy Diagnosis:   Encounter Diagnoses   Name Primary?    Decreased range of motion of right ankle     Decreased strength      Physician: Nichole Ortiz MD    Visit Date: 1/14/2022    Physician Orders: PT Eval and Treat  Medical Diagnosis from Referral: S92.011D (ICD-10-CM) - Displaced fracture of body of right calcaneus, subsequent encounter for fracture with routine healing  Evaluation Date: 12/21/2021  Authorization Period Expiration: 12/31/2022  Plan of Care Expiration: 2/18/2022  Visit # / Visits authorized: 5/50  FOTO: 5/5  PTA Visit: 0/6     Time In: 11:30 AM  Time Out: 12:30 PM  Total Billable Time: 55 minutes (TEx3, MTx1)     Precautions: Standard, GERD, HLD  · Weight bearing: Advance to weight bearing as tolerated in boot as pain allows  · Immobilization: Achilles postop boot and heel lift protocol to be followed  · Start date: 2 months postop: Surgery date: 11/5/2021  Rx/protocol and restrictions:   --Gait training, edema control/desensitization modalities  --WBAT RLE, immobilization: boot; may initiate boot weaning 3 months postop   --AROM/AAROM and gentle strengthening/theraband work, but no PROM/manipulation until 3 months postop    Subjective     Pt reports: "same always. The more I walk, the more it hurts. It's always the heel"  He was compliant with home exercise program.  Response to previous treatment: good, ongoing  Functional change: weight bearing more    Pain: 0/10  Location: R heel    Objective     Pietro received therapeutic exercises to develop strength, endurance, ROM, flexibility and posture for 45 minutes including:    Ankle circles: 50x R, avoid dorsiflexion past neutral  Sidelying ankle eversion in ankle PF: 2x20 R, 1#  Sidelying ankle inversion in ankle PF: 2x20 R, 1#  Isometric ankle PF holds sitting: 10x20'' holds R  SLR: 30x R, 1.5 #  Physioball bridges: 2x10 with 3-5" holds  Hip Abd " "SLR: 3x10 R, 1.5 #  Clamshells (R): blue TB 30x 3" hold  Hip ext SLR: 3x10 R, 1.5 #  Seated DF to neutral: 2x10 R    Foot taps: 20x B, 6 inch step  Step ups: 30x 4" step  Standing hip abd: 3x10 each  Squats: 30x    Pietro received manual therapy for 10 minutes consisting of:  Effleurage to RLE to decrease ankle edema    Home Exercises Provided and Patient Education Provided   Education provided:   - use of CAM boot  - importance of hip strength    Written Home Exercises Provided: Patient instructed to cont prior HEP.  Exercises were reviewed and Pietro was able to demonstrate them prior to the end of the session.  Pietro demonstrated good  understanding of the education provided.     See EMR under Patient Instructions for exercises provided on evaluation    Assessment     Pietro is a 61 y.o. male referred to outpatient Physical Therapy with a medical diagnosis of Displaced fracture of body of right calcaneus, subsequent encounter for fracture with routine healing presenting to PT at [facility]. He received a R calcaneal ORIF on 11/5/2021, and is WBAT with CAM boot donned for 6 weeks until follow-up on 2/3/2021.  Following achilles repair protocol and CAM boot donned throughout all weight bearing activities. Mild edema present in L ankle, mainly laterally. Strength and mobility continue to improve w/in restrictions per protocol. Most difficulty remains w/ closed chain exercises, particularly when instructed to slow speed.     Pietro Is progressing well towards his goals.   Pt prognosis is Excellent.     Pt will continue to benefit from skilled outpatient physical therapy to address the deficits listed in the problem list box on initial evaluation, provide pt/family education and to maximize pt's level of independence in the home and community environment.   Pt's spiritual, cultural and educational needs considered and pt agreeable to plan of care and goals.    Anticipated barriers to " physical therapy: severity of calcaneal fracture      Goals:   Short Term Goals:  4 weeks  1. Report decreased R ankle pain </= 1/10 with boot donned in walking to increase tolerance for ADLs. PROGRESSING; NOT MET  2. Increase R ankle inversion/eversion AROM by 10 degrees in order to aid in walking with min to no compensation. PROGRESSING; NOT MET  3. Pt will demo good sitting and standing posture for improved spine and joint alignment for improved biomechanics. PROGRESSING; NOT MET  4. Pt to tolerate HEP to improve ROM and independence with ADL's. PROGRESSING; NOT MET     Long Term Goals: 8 weeks  1. Report decreased R ankle pain </= 2/10 with fair weight bearing without boot donned to increase tolerance for increased QoL and improved ADLs. PROGRESSING; NOT MET  2. Patient goal: get back to swimming and bee keeping. PROGRESSING; NOT MET  3. Increase strength to >/= 4/5 for right ankle eversion/inversion/dorsiflexion to increase tolerance for ADL and work activities. PROGRESSING; NOT MET  4. Pt will report at </= 37% impaired on ANKLE FOTO score to demo increased functional mobility. PROGRESSING; NOT MET   5. Pt will be able to ambulate community distances and negotiate stairs with minimal ankle pain for increased functional mobility and QoL. PROGRESSING; NOT MET    Plan     Cont per POC and protocol.  Plan of care Certification: 12/21/2021 to 2/18/2022.     Pietro Jerome, PT

## 2022-01-19 ENCOUNTER — CLINICAL SUPPORT (OUTPATIENT)
Dept: REHABILITATION | Facility: HOSPITAL | Age: 62
End: 2022-01-19
Payer: COMMERCIAL

## 2022-01-19 DIAGNOSIS — M25.671 DECREASED RANGE OF MOTION OF RIGHT ANKLE: ICD-10-CM

## 2022-01-19 DIAGNOSIS — R53.1 DECREASED STRENGTH: ICD-10-CM

## 2022-01-19 PROCEDURE — 97110 THERAPEUTIC EXERCISES: CPT | Mod: PN

## 2022-01-19 PROCEDURE — 97140 MANUAL THERAPY 1/> REGIONS: CPT | Mod: PN

## 2022-01-19 NOTE — PROGRESS NOTES
Physical Therapy Daily Treatment Note     Name: Pietro Dietz  Clinic Number: [id number]    Therapy Diagnosis:   Encounter Diagnoses   Name Primary?    Decreased range of motion of right ankle     Decreased strength      Physician: Nichole Ortiz MD    Visit Date: 1/19/2022    Physician Orders: PT Eval and Treat  Medical Diagnosis from Referral: S92.011D (ICD-10-CM) - Displaced fracture of body of right calcaneus, subsequent encounter for fracture with routine healing  Evaluation Date: 12/21/2021  Authorization Period Expiration: 12/31/2022  Plan of Care Expiration: 2/18/2022  Visit # / Visits authorized: 5/50  FOTO: 5/5 Next  PTA Visit: 0/6     Time In: 12:00 PM  Time Out: 1:30 PM  Total Billable Time: 55 minutes (TEx3, MTx1)    Precautions: Standard, GERD, HLD  · Weight bearing: Advance to weight bearing as tolerated in boot as pain allows  · Immobilization: Achilles postop boot and heel lift protocol to be followed  · Start date: 2 months postop: Surgery date: 11/5/2021  Rx/protocol and restrictions:   --Gait training, edema control/desensitization modalities  --WBAT RLE, immobilization: boot; may initiate boot weaning 3 months postop   --AROM/AAROM and gentle strengthening/theraband work, but no PROM/manipulation until 3 months postop    Subjective     Pt reports: heel soreness is not worsening and maybe a little better than it was  He was compliant with home exercise program.  Response to previous treatment: good, ongoing  Functional change: weight bearing more    Pain: 0/10  Location: R heel    Objective     Pietro received therapeutic exercises to develop strength, endurance, ROM, flexibility and posture for 45 minutes including:    Ankle circles: 50x R, CW/CCW  Sidelying ankle eversion in ankle PF: 2x20 R, 2#  Sidelying ankle inversion in ankle PF: 2x20 R, 2#  Isometric ankle PF holds sitting: 10x20'' holds R  SLR: 2x15 R, 2#, cues for active DF now  Long sitting active R ankle PF: 2x20, red  "TB  Physioball bridges: 2x12 with 3-5" holds  Seated DF to neutral: 2x10 R    Step ups: 2x15 R, 6" step  Single leg stand: 4x20'' R  Lateral crab walkin laps of 20' with red TB - trial again next (back pain present)  Standing hip abd: 3x10 each - not done today, resume next  Squats: 30x - not done today    Pietro received manual therapy for 15 minutes consisting of:  Effleurage to RLE to decrease ankle edema  R TCJ distraction, Grade III-IV  R calcaneal eversion mobilizations, grade II-III    Home Exercises Provided and Patient Education Provided   Education provided:   - use of CAM boot  - importance of hip strength    Written Home Exercises Provided: Patient instructed to cont prior HEP.  Exercises were reviewed and Pietro was able to demonstrate them prior to the end of the session.  Pietro demonstrated good  understanding of the education provided.     See EMR under Patient Instructions for exercises provided on evaluation    Assessment     Pietro is a 61 y.o. male referred to outpatient Physical Therapy with a medical diagnosis of Displaced fracture of body of right calcaneus, subsequent encounter for fracture with routine healing presenting to PT at [facility]. He received a R calcaneal ORIF on 2021, and is WBAT with CAM boot donned for 6 weeks until follow-up on 2/3/2021.   Following achilles repair protocol and CAM boot donned throughout all weight bearing activities. S/P week 11 on 2022.  Edema slowly improving and pt advised to get a stocking for swelling. Mild heel discomfort and weight bearing tolerance continues to improve. Advancing standing balance activities, and initiated active plantarflexion strengthening activities. Good pain control throughout session.     Pietro Is progressing well towards his goals.   Pt prognosis is Excellent.     Pt will continue to benefit from skilled outpatient physical therapy to address the deficits listed in the problem " list box on initial evaluation, provide pt/family education and to maximize pt's level of independence in the home and community environment.   Pt's spiritual, cultural and educational needs considered and pt agreeable to plan of care and goals.    Anticipated barriers to physical therapy: severity of calcaneal fracture      Goals:   Short Term Goals:  4 weeks  1. Report decreased R ankle pain </= 1/10 with boot donned in walking to increase tolerance for ADLs. PROGRESSING; NOT MET  2. Increase R ankle inversion/eversion AROM by 10 degrees in order to aid in walking with min to no compensation. PROGRESSING; NOT MET  3. Pt will demo good sitting and standing posture for improved spine and joint alignment for improved biomechanics. PROGRESSING; NOT MET  4. Pt to tolerate HEP to improve ROM and independence with ADL's. PROGRESSING; NOT MET     Long Term Goals: 8 weeks  1. Report decreased R ankle pain </= 2/10 with fair weight bearing without boot donned to increase tolerance for increased QoL and improved ADLs. PROGRESSING; NOT MET  2. Patient goal: get back to swimming and bee keeping. PROGRESSING; NOT MET  3. Increase strength to >/= 4/5 for right ankle eversion/inversion/dorsiflexion to increase tolerance for ADL and work activities. PROGRESSING; NOT MET  4. Pt will report at </= 37% impaired on ANKLE FOTO score to demo increased functional mobility. PROGRESSING; NOT MET   5. Pt will be able to ambulate community distances and negotiate stairs with minimal ankle pain for increased functional mobility and QoL. PROGRESSING; NOT MET    Plan     Cont per POC and protocol.  Plan of care Certification: 12/21/2021 to 2/18/2022.     Vinh Whelan, PT

## 2022-01-21 ENCOUNTER — CLINICAL SUPPORT (OUTPATIENT)
Dept: REHABILITATION | Facility: HOSPITAL | Age: 62
End: 2022-01-21
Payer: COMMERCIAL

## 2022-01-21 DIAGNOSIS — M25.671 DECREASED RANGE OF MOTION OF RIGHT ANKLE: ICD-10-CM

## 2022-01-21 DIAGNOSIS — R53.1 DECREASED STRENGTH: ICD-10-CM

## 2022-01-21 PROCEDURE — 97140 MANUAL THERAPY 1/> REGIONS: CPT | Mod: PN | Performed by: PHYSICAL THERAPIST

## 2022-01-21 PROCEDURE — 97110 THERAPEUTIC EXERCISES: CPT | Mod: PN | Performed by: PHYSICAL THERAPIST

## 2022-01-21 NOTE — PROGRESS NOTES
"  Physical Therapy Daily Treatment Note     Name: Pietro Dietz  Clinic Number: [id number]    Therapy Diagnosis:   Encounter Diagnoses   Name Primary?    Decreased range of motion of right ankle     Decreased strength      Physician: Nichole Ortiz MD    Visit Date: 1/21/2022    Physician Orders: PT Eval and Treat  Medical Diagnosis from Referral: S92.011D (ICD-10-CM) - Displaced fracture of body of right calcaneus, subsequent encounter for fracture with routine healing  Evaluation Date: 12/21/2021  Authorization Period Expiration: 12/31/2022  Plan of Care Expiration: 2/18/2022  Visit # / Visits authorized: 6/50  FOTO: 1/5 Performed 1/21/22   PTA Visit: 0/6     Time In: 11:25 PM  Time Out: 12:20 PM  Total Billable Time: 55 minutes (TEx3, MTx1)    Precautions: Standard, GERD, HLD  · Weight bearing: Advance to weight bearing as tolerated in boot as pain allows  · Immobilization: Achilles postop boot and heel lift protocol to be followed  · Start date: 2 months postop: Surgery date: 11/5/2021  Rx/protocol and restrictions:   --Gait training, edema control/desensitization modalities  --WBAT RLE, immobilization: boot; may initiate boot weaning 3 months postop   --AROM/AAROM and gentle strengthening/theraband work, but no PROM/manipulation until 3 months postop    Subjective     Pt reports: "the pain is almost gone and is very very minimal"   He was compliant with home exercise program.  Response to previous treatment: good, ongoing  Functional change: standing w/o boot while drying hair w/o issue    Pain: 0/10  Location: R heel    Objective     Pietro received therapeutic exercises to develop strength, endurance, ROM, flexibility and posture for 40 minutes including:    Ankle circles: 50x R, CW/CCW  Sidelying ankle eversion in ankle PF: 2x20 R, 2#  Sidelying ankle inversion in ankle PF: 2x20 R, 2#  Isometric ankle PF holds sitting: 10x20'' holds R  SLR: 2x15 R, 2#, cues for active DF now  Long sitting active R ankle " "PF: 2x20, red TB  Physioball bridges: 2x12 with 3-5" holds  Seated DF to neutral: 2x10 R    Step ups: 2x15 R, 6" step  Single leg stand: 4x20'' R  Lateral crab walkin laps 10 steps each with red TB  Standing hip abd: 3x10 each - not done today, resume next  Squats: 30x     Pietro received manual therapy for 15 minutes consisting of:  Effleurage to RLE to decrease ankle edema  R TCJ distraction and A>P mobs, Grade III-IV  R calcaneal eversion mobilizations, grade II-III    Home Exercises Provided and Patient Education Provided   Education provided:   - use of CAM boot  - importance of hip strength    Written Home Exercises Provided: Patient instructed to cont prior HEP.  Exercises were reviewed and Pietro was able to demonstrate them prior to the end of the session.  Pietro demonstrated good  understanding of the education provided.     See EMR under Patient Instructions for exercises provided on evaluation    Assessment     Pietro is a 61 y.o. male referred to outpatient Physical Therapy with a medical diagnosis of Displaced fracture of body of right calcaneus, subsequent encounter for fracture with routine healing presenting to PT at [facility]. He received a R calcaneal ORIF on 2021, and is WBAT with CAM boot donned for 6 weeks until follow-up on 2/3/2021.   Following achilles repair protocol and CAM boot donned throughout all weight bearing activities. S/P week 11 on 2022.  Edema continues to improve, as does his ankle ROM. Continues to weight bear in boot w/o any pain. LBP did not limit session at all today.    Pietro Is progressing well towards his goals.   Pt prognosis is Excellent.     Pt will continue to benefit from skilled outpatient physical therapy to address the deficits listed in the problem list box on initial evaluation, provide pt/family education and to maximize pt's level of independence in the home and community environment.   Pt's spiritual, " cultural and educational needs considered and pt agreeable to plan of care and goals.    Anticipated barriers to physical therapy: severity of calcaneal fracture      Goals:   Short Term Goals:  4 weeks  1. Report decreased R ankle pain </= 1/10 with boot donned in walking to increase tolerance for ADLs. PROGRESSING; NOT MET  2. Increase R ankle inversion/eversion AROM by 10 degrees in order to aid in walking with min to no compensation. PROGRESSING; NOT MET  3. Pt will demo good sitting and standing posture for improved spine and joint alignment for improved biomechanics. PROGRESSING; NOT MET  4. Pt to tolerate HEP to improve ROM and independence with ADL's. PROGRESSING; NOT MET     Long Term Goals: 8 weeks  1. Report decreased R ankle pain </= 2/10 with fair weight bearing without boot donned to increase tolerance for increased QoL and improved ADLs. PROGRESSING; NOT MET  2. Patient goal: get back to swimming and bee keeping. PROGRESSING; NOT MET  3. Increase strength to >/= 4/5 for right ankle eversion/inversion/dorsiflexion to increase tolerance for ADL and work activities. PROGRESSING; NOT MET  4. Pt will report at </= 37% impaired on ANKLE FOTO score to demo increased functional mobility. PROGRESSING; NOT MET   5. Pt will be able to ambulate community distances and negotiate stairs with minimal ankle pain for increased functional mobility and QoL. PROGRESSING; NOT MET    Plan     Cont per POC and protocol.  Plan of care Certification: 12/21/2021 to 2/18/2022.     Pietro Jerome, PT

## 2022-01-25 ENCOUNTER — CLINICAL SUPPORT (OUTPATIENT)
Dept: REHABILITATION | Facility: HOSPITAL | Age: 62
End: 2022-01-25
Payer: COMMERCIAL

## 2022-01-25 DIAGNOSIS — R53.1 DECREASED STRENGTH: ICD-10-CM

## 2022-01-25 DIAGNOSIS — M25.671 DECREASED RANGE OF MOTION OF RIGHT ANKLE: ICD-10-CM

## 2022-01-25 PROCEDURE — 97110 THERAPEUTIC EXERCISES: CPT | Mod: PN

## 2022-01-25 NOTE — PROGRESS NOTES
"  Physical Therapy Daily Treatment Note     Name: Pietro Dietz  Clinic Number: [id number]    Therapy Diagnosis:   Encounter Diagnoses   Name Primary?    Decreased range of motion of right ankle     Decreased strength      Physician: Nichole Ortiz MD    Visit Date: 1/25/2022    Physician Orders: PT Eval and Treat  Medical Diagnosis from Referral: S92.011D (ICD-10-CM) - Displaced fracture of body of right calcaneus, subsequent encounter for fracture with routine healing  Evaluation Date: 12/21/2021  Authorization Period Expiration: 12/31/2022  Plan of Care Expiration: 2/18/2022  Visit # / Visits authorized: 8/50  FOTO: 8/10   PTA Visit: 0/6     Time In: 11:05 AM  Time Out: 12:00 PM  Total Billable Time: 55 minutes (TEx4)    Precautions: Standard, GERD, HLD  · Weight bearing: Advance to weight bearing as tolerated in boot as pain allows  · Immobilization: Achilles postop boot and heel lift protocol to be followed  · Start date: 2 months postop: Surgery date: 11/5/2021  Rx/protocol and restrictions:   --Gait training, edema control/desensitization modalities  --WBAT RLE, immobilization: boot; may initiate boot weaning 3 months postop   --AROM/AAROM and gentle strengthening/theraband work, but no PROM/manipulation until 3 months postop    Subjective     Pt reports: no heel pain recently and doing well.  He was compliant with home exercise program.  Response to previous treatment: good, ongoing  Functional change: standing w/o boot while drying hair w/o issue    Pain: 0/10  Location: R heel    Objective     Pietro received therapeutic exercises to develop strength, endurance, ROM, flexibility and posture for 55 minutes including:    BAPS board: 40x R CW/CCW, L3  BAPS board: 2x30 R, L3 for DF/PF and INV/EV each  SLR: 2x15 R, 2#, cues for active DF now  Seated gastroc active R ankle PF: 2x20, blue TB  Physioball bridges: 2x12 with 3-5" holds  Seated DF: 2x20 R, 2#  Bike: next with boot doffed    Step ups: 3x15 R, 6" " step  Single leg stand: 5x30'' R  Lateral crab walkin laps 10 steps each with red TB - not done today  Standing hip abd: 2x15 B  Leg press: 2x20 DL, 6.5 plates; 2x15 R SL, 4 plates  Hamstring curl machine: 2x12 DL, 4 plates - not done today, next  LAQ machine: 2x10 DL, 40#    Pietro received manual therapy for 00 minutes consisting of:  Effleurage to RLE to decrease ankle edema  R TCJ distraction and A>P mobs, Grade III-IV  R calcaneal eversion mobilizations, grade II-III    Home Exercises Provided and Patient Education Provided   Education provided:   - use of CAM boot  - importance of hip strength    Written Home Exercises Provided: Patient instructed to cont prior HEP.  Exercises were reviewed and Pietro was able to demonstrate them prior to the end of the session.  Pietro demonstrated good  understanding of the education provided.     See EMR under Patient Instructions for exercises provided on evaluation    Assessment     Pietro is a 61 y.o. male referred to outpatient Physical Therapy with a medical diagnosis of Displaced fracture of body of right calcaneus, subsequent encounter for fracture with routine healing presenting to PT at [facility]. He received a R calcaneal ORIF on 2021, and is WBAT with CAM boot donned for 6 weeks until follow-up on 2/3/2021.   Following achilles repair protocol and CAM boot donned throughout all weight bearing activities. S/P week 12 on 2022.  Advancing weight bearing activities and resistive ankle mobility exercises towards plantarflexion. Pt did well without any pain and moderate fatigue. Pt given blue theraband to work on resistive plantarflexion at home. Pt will see surgeon at the beginning of next month for CAM boot discharge.     Pietro Is progressing well towards his goals.   Pt prognosis is Excellent.     Pt will continue to benefit from skilled outpatient physical therapy to address the deficits listed in the problem list  box on initial evaluation, provide pt/family education and to maximize pt's level of independence in the home and community environment.   Pt's spiritual, cultural and educational needs considered and pt agreeable to plan of care and goals.    Anticipated barriers to physical therapy: severity of calcaneal fracture      Goals:   Short Term Goals:  4 weeks  1. Report decreased R ankle pain </= 1/10 with boot donned in walking to increase tolerance for ADLs. PROGRESSING; NOT MET  2. Increase R ankle inversion/eversion AROM by 10 degrees in order to aid in walking with min to no compensation. PROGRESSING; NOT MET  3. Pt will demo good sitting and standing posture for improved spine and joint alignment for improved biomechanics. PROGRESSING; NOT MET  4. Pt to tolerate HEP to improve ROM and independence with ADL's. PROGRESSING; NOT MET     Long Term Goals: 8 weeks  1. Report decreased R ankle pain </= 2/10 with fair weight bearing without boot donned to increase tolerance for increased QoL and improved ADLs. PROGRESSING; NOT MET  2. Patient goal: get back to swimming and bee keeping. PROGRESSING; NOT MET  3. Increase strength to >/= 4/5 for right ankle eversion/inversion/dorsiflexion to increase tolerance for ADL and work activities. PROGRESSING; NOT MET  4. Pt will report at </= 37% impaired on ANKLE FOTO score to demo increased functional mobility. PROGRESSING; NOT MET   5. Pt will be able to ambulate community distances and negotiate stairs with minimal ankle pain for increased functional mobility and QoL. PROGRESSING; NOT MET    Plan     Cont per POC and protocol.  Plan of care Certification: 12/21/2021 to 2/18/2022.     Vinh Whelan, PT

## 2022-01-28 ENCOUNTER — CLINICAL SUPPORT (OUTPATIENT)
Dept: REHABILITATION | Facility: HOSPITAL | Age: 62
End: 2022-01-28
Payer: COMMERCIAL

## 2022-01-28 DIAGNOSIS — M25.671 DECREASED RANGE OF MOTION OF RIGHT ANKLE: ICD-10-CM

## 2022-01-28 DIAGNOSIS — S92.011D CLOSED DISPLACED FRACTURE OF BODY OF RIGHT CALCANEUS WITH ROUTINE HEALING, SUBSEQUENT ENCOUNTER: Primary | ICD-10-CM

## 2022-01-28 DIAGNOSIS — R53.1 DECREASED STRENGTH: ICD-10-CM

## 2022-01-28 PROCEDURE — 97110 THERAPEUTIC EXERCISES: CPT | Mod: PN

## 2022-01-28 NOTE — PROGRESS NOTES
"  Physical Therapy Daily Treatment Note     Name: Pietro Dietz  Clinic Number: [id number]    Therapy Diagnosis:   Encounter Diagnoses   Name Primary?    Decreased range of motion of right ankle     Decreased strength      Physician: Nichole Ortiz MD    Visit Date: 1/28/2022    Physician Orders: PT Eval and Treat  Medical Diagnosis from Referral: S92.011D (ICD-10-CM) - Displaced fracture of body of right calcaneus, subsequent encounter for fracture with routine healing  Evaluation Date: 12/21/2021  Authorization Period Expiration: 12/31/2022  Plan of Care Expiration: 2/18/2022  Visit # / Visits authorized: 9/50  FOTO: 9/10   PTA Visit: 0/6     Time In: 12:05 PM  Time Out: 1:00 PM  Total Billable Time: 55 minutes (TEx4)    Precautions: Standard, GERD, HLD  · Weight bearing: Advance to weight bearing as tolerated in boot as pain allows  · Immobilization: Achilles postop boot and heel lift protocol to be followed  · Start date: 2 months postop: Surgery date: 11/5/2021  Rx/protocol and restrictions:   --Gait training, edema control/desensitization modalities  --WBAT RLE, immobilization: boot; may initiate boot weaning 3 months postop   --AROM/AAROM and gentle strengthening/theraband work, but no PROM/manipulation until 3 months postop    Subjective     Pt reports: no heel pain and will take out last heel lift next week. Will see MD next week as well.  He was compliant with home exercise program.  Response to previous treatment: good, ongoing  Functional change: standing w/o boot while drying hair w/o issue    Pain: 0/10  Location: R heel    Objective     Pietro received therapeutic exercises to develop strength, endurance, ROM, flexibility and posture for 55 minutes including:    BAPS board: 40x R CW/CCW, L3  Seated gastroc active R ankle PF: 2x20, blue TB - HEP  Seated DF: 2x20 R, 2# - not done today  Bike: 6', L2    Step ups: 2x15 R, 6" step with runner's knee  Single leg stand: 5x30'' R on airex  Standing hip " abd: 2x15 B  Shuttle: 2x8, 1/2 cords  Leg press:  2x20 R SL, 6 plates  LAQ machine: 3x8 SL R, 30#  Shuttle: 2x8 SL R, 0.5 cords    Pietro received manual therapy for 00 minutes consisting of:  Effleurage to RLE to decrease ankle edema  R TCJ distraction and A>P mobs, Grade III-IV  R calcaneal eversion mobilizations, grade II-III    Home Exercises Provided and Patient Education Provided   Education provided:   - use of CAM boot  - importance of hip strength    Written Home Exercises Provided: Patient instructed to cont prior HEP.  Exercises were reviewed and Pietro was able to demonstrate them prior to the end of the session.  Pietro demonstrated good  understanding of the education provided.     See EMR under Patient Instructions for exercises provided on evaluation    Assessment     Pietro is a 61 y.o. male referred to outpatient Physical Therapy with a medical diagnosis of Displaced fracture of body of right calcaneus, subsequent encounter for fracture with routine healing presenting to PT at [facility]. He received a R calcaneal ORIF on 11/5/2021, and is WBAT with CAM boot donned for 6 weeks until follow-up on 2/3/2021.   Following achilles repair protocol and CAM boot donned throughout all weight bearing activities. S/P week 13 on 1/28/2022.  Progressed single leg achilles tendon loading today with shuttle. Fair strength but decreased resistance to 0.5 cords due to pt reports of minor sharp pains near achilles insertion. No pain after and will cautiously progress per tolerance.     Pietro Is progressing well towards his goals.   Pt prognosis is Excellent.     Pt will continue to benefit from skilled outpatient physical therapy to address the deficits listed in the problem list box on initial evaluation, provide pt/family education and to maximize pt's level of independence in the home and community environment.   Pt's spiritual, cultural and educational needs considered and pt  agreeable to plan of care and goals.    Anticipated barriers to physical therapy: severity of calcaneal fracture      Goals:   Short Term Goals:  4 weeks  1. Report decreased R ankle pain </= 1/10 with boot donned in walking to increase tolerance for ADLs. PROGRESSING; NOT MET  2. Increase R ankle inversion/eversion AROM by 10 degrees in order to aid in walking with min to no compensation. PROGRESSING; NOT MET  3. Pt will demo good sitting and standing posture for improved spine and joint alignment for improved biomechanics. PROGRESSING; NOT MET  4. Pt to tolerate HEP to improve ROM and independence with ADL's. PROGRESSING; NOT MET     Long Term Goals: 8 weeks  1. Report decreased R ankle pain </= 2/10 with fair weight bearing without boot donned to increase tolerance for increased QoL and improved ADLs. PROGRESSING; NOT MET  2. Patient goal: get back to swimming and bee keeping. PROGRESSING; NOT MET  3. Increase strength to >/= 4/5 for right ankle eversion/inversion/dorsiflexion to increase tolerance for ADL and work activities. PROGRESSING; NOT MET  4. Pt will report at </= 37% impaired on ANKLE FOTO score to demo increased functional mobility. PROGRESSING; NOT MET   5. Pt will be able to ambulate community distances and negotiate stairs with minimal ankle pain for increased functional mobility and QoL. PROGRESSING; NOT MET    Plan     Cont per POC and protocol.  Plan of care Certification: 12/21/2021 to 2/18/2022.     Vinh Whelan, PT

## 2022-02-03 ENCOUNTER — PATIENT MESSAGE (OUTPATIENT)
Dept: ADMINISTRATIVE | Facility: OTHER | Age: 62
End: 2022-02-03
Payer: COMMERCIAL

## 2022-02-03 ENCOUNTER — HOSPITAL ENCOUNTER (OUTPATIENT)
Dept: RADIOLOGY | Facility: HOSPITAL | Age: 62
Discharge: HOME | End: 2022-02-03
Attending: ORTHOPAEDIC SURGERY
Payer: COMMERCIAL

## 2022-02-03 ENCOUNTER — OFFICE VISIT (OUTPATIENT)
Dept: ORTHOPEDICS | Facility: CLINIC | Age: 62
End: 2022-02-03
Payer: COMMERCIAL

## 2022-02-03 DIAGNOSIS — S92.011D CLOSED DISPLACED FRACTURE OF BODY OF RIGHT CALCANEUS WITH ROUTINE HEALING, SUBSEQUENT ENCOUNTER: Primary | ICD-10-CM

## 2022-02-03 DIAGNOSIS — S92.011D CLOSED DISPLACED FRACTURE OF BODY OF RIGHT CALCANEUS WITH ROUTINE HEALING, SUBSEQUENT ENCOUNTER: ICD-10-CM

## 2022-02-03 PROCEDURE — 73650 X-RAY EXAM OF HEEL: CPT | Mod: 26,RT,, | Performed by: RADIOLOGY

## 2022-02-03 PROCEDURE — 99024 POSTOP FOLLOW-UP VISIT: CPT | Mod: S$GLB,,, | Performed by: ORTHOPAEDIC SURGERY

## 2022-02-03 PROCEDURE — 73650 XR CALCANEUS 2 VIEW RIGHT: ICD-10-PCS | Mod: 26,RT,, | Performed by: RADIOLOGY

## 2022-02-03 PROCEDURE — 99024 PR POST-OP FOLLOW-UP VISIT: ICD-10-PCS | Mod: S$GLB,,, | Performed by: ORTHOPAEDIC SURGERY

## 2022-02-03 PROCEDURE — 1159F MED LIST DOCD IN RCRD: CPT | Mod: CPTII,S$GLB,, | Performed by: ORTHOPAEDIC SURGERY

## 2022-02-03 PROCEDURE — 99999 PR PBB SHADOW E&M-EST. PATIENT-LVL II: ICD-10-PCS | Mod: PBBFAC,,, | Performed by: ORTHOPAEDIC SURGERY

## 2022-02-03 PROCEDURE — 99999 PR PBB SHADOW E&M-EST. PATIENT-LVL II: CPT | Mod: PBBFAC,,, | Performed by: ORTHOPAEDIC SURGERY

## 2022-02-03 PROCEDURE — 73650 X-RAY EXAM OF HEEL: CPT | Mod: TC,RT

## 2022-02-03 PROCEDURE — 1159F PR MEDICATION LIST DOCUMENTED IN MEDICAL RECORD: ICD-10-PCS | Mod: CPTII,S$GLB,, | Performed by: ORTHOPAEDIC SURGERY

## 2022-02-03 RX ORDER — AMOXICILLIN 500 MG/1
CAPSULE ORAL
COMMUNITY
Start: 2021-11-28 | End: 2022-06-03

## 2022-02-03 NOTE — LETTER
February 3, 2022        Vinh Whelan, PT             [facility] 5th [address]  [address], [address]  [facility] [address]  Phone: [phone]   Patient: Pietro Dietz   MR Number: [id number]   YOB: 1960   Date of Visit: 2/3/2022     Dear Dr. Whelan,     Pietro Dietz was seen in clinic 02/03/2022.  The following updated PT orders apply to their ongoing care.    Thank you SO much for excellent work with him.  He looked GREAT today.    --boot discontinued and cleared to advance as tolerated  --no restrictions, just pain as guide    I appreciate your assistance in their rehabilitation.  Please don't hesitate to call or reach out with questions/concerns.    Sincerely,    Nichole Ortiz MD  [phone]  Foot & Ankle Orthopedic Surgery  02/03/2022

## 2022-02-03 NOTE — PROGRESS NOTES
"Subjective:   Chief complaint: Follow-up right calcaneus.    11/5/21 Right Calc ORIF    HPI:   Pietro Dietz is a 62 y.o. male who presents today for follow-up.  Pain is 2/10.  He has been swimming and working in PT.  Eager to wean boot and "get life back" - only mild swelling and discomfort.    ROS:  Musculoskeletal: per HPI     Objective:   Exam:  There were no vitals filed for this visit.  General: No acute distress, well-appearing  Musculoskeletal: Standing examination deferred.      Incision benign.  No scabbing present.  No drainage present.  There is no postop swelling. NT about calcaneal tuberosity, mildly TTP over later PFO    Fires TA/GSC/PTT/peroneals.  SILT SP/DP/PT with no paresthesias.     Imaging:  Radiographs were ordered and independently interpreted by me.    Standing 2v right calcaneus demonstrate intact hardware/fixation and obscuring of fracture line with maintained posterior facet alignment and joint space    Additional testing/results reviewed:  None      Assessment:     1. Closed displaced fracture of body of right calcaneus with routine healing, subsequent encounter        Patient is seen for a postop visit (<90 days postop) without complications expected from treatment.    Data: 1 results reviewed    Treatment plan: Low risk of morbidity from treatment plan    3 month postop, will progress as tolerated     Plan:        Weight bearing: Weight bearing as tolerated right leg   Immobilization: Discontinue boot today and and then wean out of boot/postop shoe into normal supportive shoe as pain and swelling allow   Therapy: Continue PT- note sent to PT with updates   Restrictions: None, Advance as tolerated, duration/frequency per therapist discretion; modalities as indicated    Follow-up: 2 months with 2v right calcaneus standing       No orders of the defined types were placed in this encounter.      Past Medical History:   Diagnosis Date    Fatty liver     GERD (gastroesophageal " reflux disease)     Hyperlipidemia     Sleep apnea        Past Surgical History:   Procedure Laterality Date    APPENDECTOMY      5 years old    COSMETIC SURGERY      OPEN REDUCTION AND INTERNAL FIXATION (ORIF) OF FRACTURE OF CALCANEUS Right 11/5/2021    Procedure: ORIF, FRACTURE, CALCANEUS;  Surgeon: Nichole Ortiz MD;  Location: [facility] OR [address] [place] [address];  Service: Orthopedics;  Laterality: Right;    VITRECTOMY         Family History   Problem Relation Age of Onset    Prostate cancer Neg Hx     Kidney disease Neg Hx        Social History     Socioeconomic History    Marital status:    Tobacco Use    Smoking status: Never Smoker    Smokeless tobacco: Never Used   Substance and Sexual Activity    Alcohol use: Yes     Alcohol/week: 3.0 standard drinks     Types: 3 Shots of liquor per week    Drug use: No    Sexual activity: Not Currently     Partners: Female

## 2022-02-04 ENCOUNTER — PATIENT MESSAGE (OUTPATIENT)
Dept: ORTHOPEDICS | Facility: CLINIC | Age: 62
End: 2022-02-04
Payer: COMMERCIAL

## 2022-02-17 ENCOUNTER — TELEPHONE (OUTPATIENT)
Dept: ORTHOPEDICS | Facility: CLINIC | Age: 62
End: 2022-02-17
Payer: COMMERCIAL

## 2022-02-22 ENCOUNTER — TELEPHONE (OUTPATIENT)
Dept: REHABILITATION | Facility: HOSPITAL | Age: 62
End: 2022-02-22
Payer: COMMERCIAL

## 2022-02-22 NOTE — TELEPHONE ENCOUNTER
Pt reported he canceled his scheduled visits due to insurance concerns. He reports that he spoke with his insurance whom said they have not been billed, and does not want to get a large bill not knowing how much everything is costing at this time. PT will contact [place] billing staff to try to resolve issue. Pt was given advice on how to continue therapy at home in the meantime using his home pool to perform calf strengthening by unloading his body weight to perform full height heel raises to his tolerance.

## 2022-03-30 ENCOUNTER — OFFICE VISIT (OUTPATIENT)
Dept: SLEEP MEDICINE | Facility: CLINIC | Age: 62
End: 2022-03-30
Payer: COMMERCIAL

## 2022-03-30 DIAGNOSIS — J30.9 ALLERGIC SINUSITIS: ICD-10-CM

## 2022-03-30 DIAGNOSIS — F51.09 OTHER INSOMNIA NOT DUE TO A SUBSTANCE OR KNOWN PHYSIOLOGICAL CONDITION: ICD-10-CM

## 2022-03-30 DIAGNOSIS — G47.33 SEVERE OBSTRUCTIVE SLEEP APNEA: Primary | ICD-10-CM

## 2022-03-30 PROCEDURE — 99214 OFFICE O/P EST MOD 30 MIN: CPT | Mod: 95,,, | Performed by: INTERNAL MEDICINE

## 2022-03-30 PROCEDURE — 99214 PR OFFICE/OUTPT VISIT, EST, LEVL IV, 30-39 MIN: ICD-10-PCS | Mod: 95,,, | Performed by: INTERNAL MEDICINE

## 2022-03-30 NOTE — PROGRESS NOTES
The chief complaint leading to consultation is: FREDIS    Visit type: audiovisual switched to telephone due to technical problems    19 minutes of total time spent on the encounter, which includes face to face time and non-face to face time preparing to see the patient (eg, review of tests), Obtaining and/or reviewing separately obtained history, Documenting clinical information in the electronic or other health record, Independently interpreting results (not separately reported) and communicating results to the patient/family/caregiver, or Care coordination (not separately reported).     Each patient to whom he or she provides medical services by telemedicine is:  (1) informed of the relationship between the physician and patient and the respective role of any other health care provider with respect to management of the patient; and (2) notified that he or she may decline to receive medical services by telemedicine and may withdraw from such care at any time.      [facility]    Referred by No ref. provider found     NEW PATIENT VISIT    Pietro Dietz  is a pleasant 62 y.o. male  with PMH significant for severe FREDIS who presents today for mgmt of FREDIS    Reports wearing nightly without issues.     ESTABLISHED    Here today for follow-up after receiving CPAP machine    PLAN last visit:   -needs new machine with auto capabilities (auto 11-14 cwp).  -trial of neurontin for RLS discussed (he will consider, has 100mg capsules at home)  -discussed trial of PAP therapy if FREDIS present   -driving precautions were discussed with the patient     Since last visit:     Had trouble with 11-14 (dry mouth)  Changed the machine to 9-11 which has fixed the problem    PAP history   Problems    Mask Nasal pillows (tried chin strap without benefit)   Pressure 9-11   Benefit Sleeps well with it   DME none   Machine age Circa 2008   Download 3.29.22: 40/40 x 9h 23m,9-11 (10.3/10.7/11.5) av lk 13, AHI 1.1       SLEEP  SCHEDULE   Bed Time 11pm   Sleep Latency Somewhat harder of late   Arousals    Nocturia Usually not   Back to sleep    Wake time 7:30   Naps    Work retired       There were no vitals filed for this visit.  Physical Exam:    GEN:   Well-appearing  Psych:  Appropriate affect, demonstrates insight  SKIN:  No rash on the face or bridge of the nose    LABS:   Lab Results   Component Value Date    HGB 15.2 10/23/2021    CO2 24 10/23/2021       RECORDS REVIEWED PREVIOUSLY:    CPAP  10/10/2008: 5-11, rx 11cwp  PSG 10/3/2008: RDI 33.9 per hour, kimani 87%  MSLT 10.3.2008: 8 minutes, no SOREMps    ASSESSMENT    No flowsheet data found.  PROBLEM DESCRIPTION Interval hx STATUS   Insomnia   Some trouble falling asleep, attributes to restlessness in the legs No trouble falling asleep currently improved   Severe FREDIS   Dx 2008, doing well on CPAP  Good usage and  efficacy controlled   Daytime Sx   denies sleepiness when inactive (takes a nap purposefully on occasional)  denies sleepiness when driving   ESS n/a/24 on intake Taking a nap prn  No sleepiness   n/a   RLS   + urge to move, worse at length  On Paxil 10mg (needs for anxiety) Not bothering recently  Did not need to try gabapentin n/a   Sinus congestion Taking allegra prn  Tried flonase in the past, doesn't recall if it helped or not   Recent congestion   New   Other issues:     PLAN     -continue 9-11 which has good efficacy and sx response  -suggested trying flonase daily during allergy season  -the patient is using and benefiting from PAP therapy      RTC prn         The patient was given open opportunity to ask questions and/or express concerns about treatment plan.   All questions/concerns were discussed.     Two patient identifiers used prior to evaluation.

## 2022-04-04 DIAGNOSIS — S92.011D CLOSED DISPLACED FRACTURE OF BODY OF RIGHT CALCANEUS WITH ROUTINE HEALING, SUBSEQUENT ENCOUNTER: Primary | ICD-10-CM

## 2022-04-14 ENCOUNTER — IMMUNIZATION (OUTPATIENT)
Dept: INTERNAL MEDICINE | Facility: CLINIC | Age: 62
End: 2022-04-14
Payer: COMMERCIAL

## 2022-04-14 DIAGNOSIS — Z23 NEED FOR VACCINATION: Primary | ICD-10-CM

## 2022-04-14 PROCEDURE — 0054A COVID-19, MRNA, LNP-S, PF, 30 MCG/0.3 ML DOSE VACCINE (PFIZER): CPT | Mod: PBBFAC | Performed by: FAMILY MEDICINE

## 2022-06-03 ENCOUNTER — LAB VISIT (OUTPATIENT)
Dept: LAB | Facility: HOSPITAL | Age: 62
End: 2022-06-03
Attending: FAMILY MEDICINE
Payer: COMMERCIAL

## 2022-06-03 ENCOUNTER — OFFICE VISIT (OUTPATIENT)
Dept: INTERNAL MEDICINE | Facility: CLINIC | Age: 62
End: 2022-06-03
Payer: COMMERCIAL

## 2022-06-03 VITALS
SYSTOLIC BLOOD PRESSURE: 130 MMHG | HEIGHT: 66 IN | TEMPERATURE: 97 F | DIASTOLIC BLOOD PRESSURE: 85 MMHG | BODY MASS INDEX: 32.24 KG/M2 | WEIGHT: 200.63 LBS | HEART RATE: 77 BPM | OXYGEN SATURATION: 96 %

## 2022-06-03 DIAGNOSIS — Z12.5 PROSTATE CANCER SCREENING: ICD-10-CM

## 2022-06-03 DIAGNOSIS — E78.49 OTHER HYPERLIPIDEMIA: ICD-10-CM

## 2022-06-03 DIAGNOSIS — F41.9 ANXIETY: ICD-10-CM

## 2022-06-03 DIAGNOSIS — N13.8 BENIGN PROSTATIC HYPERPLASIA WITH URINARY OBSTRUCTION: ICD-10-CM

## 2022-06-03 DIAGNOSIS — K21.9 GASTROESOPHAGEAL REFLUX DISEASE WITHOUT ESOPHAGITIS: ICD-10-CM

## 2022-06-03 DIAGNOSIS — N40.1 BENIGN PROSTATIC HYPERPLASIA WITH URINARY OBSTRUCTION: ICD-10-CM

## 2022-06-03 DIAGNOSIS — G47.33 OBSTRUCTIVE SLEEP APNEA SYNDROME: ICD-10-CM

## 2022-06-03 DIAGNOSIS — Z82.49 FAMILY HISTORY OF EARLY CAD: ICD-10-CM

## 2022-06-03 DIAGNOSIS — Z00.00 WELL ADULT EXAM: Primary | ICD-10-CM

## 2022-06-03 DIAGNOSIS — K76.0 FATTY LIVER: ICD-10-CM

## 2022-06-03 DIAGNOSIS — Z00.00 WELL ADULT EXAM: ICD-10-CM

## 2022-06-03 LAB
25(OH)D3+25(OH)D2 SERPL-MCNC: 44 NG/ML (ref 30–96)
ALBUMIN SERPL BCP-MCNC: 4.1 G/DL (ref 3.5–5.2)
ALP SERPL-CCNC: 125 U/L (ref 55–135)
ALT SERPL W/O P-5'-P-CCNC: 52 U/L (ref 10–44)
ANION GAP SERPL CALC-SCNC: 7 MMOL/L (ref 8–16)
ANISOCYTOSIS BLD QL SMEAR: SLIGHT
AST SERPL-CCNC: 31 U/L (ref 10–40)
BASOPHILS # BLD AUTO: 0.03 K/UL (ref 0–0.2)
BASOPHILS NFR BLD: 0.6 % (ref 0–1.9)
BILIRUB SERPL-MCNC: 1.2 MG/DL (ref 0.1–1)
BUN SERPL-MCNC: 21 MG/DL (ref 8–23)
BURR CELLS BLD QL SMEAR: ABNORMAL
CALCIUM SERPL-MCNC: 9.4 MG/DL (ref 8.7–10.5)
CHLORIDE SERPL-SCNC: 106 MMOL/L (ref 95–110)
CHOLEST SERPL-MCNC: 141 MG/DL (ref 120–199)
CHOLEST/HDLC SERPL: 3.9 {RATIO} (ref 2–5)
CO2 SERPL-SCNC: 27 MMOL/L (ref 23–29)
COMPLEXED PSA SERPL-MCNC: 2.5 NG/ML (ref 0–4)
CREAT SERPL-MCNC: 1.1 MG/DL (ref 0.5–1.4)
DACRYOCYTES BLD QL SMEAR: ABNORMAL
DIFFERENTIAL METHOD: ABNORMAL
EOSINOPHIL # BLD AUTO: 0.1 K/UL (ref 0–0.5)
EOSINOPHIL NFR BLD: 2.2 % (ref 0–8)
ERYTHROCYTE [DISTWIDTH] IN BLOOD BY AUTOMATED COUNT: 13.4 % (ref 11.5–14.5)
EST. GFR  (AFRICAN AMERICAN): >60 ML/MIN/1.73 M^2
EST. GFR  (NON AFRICAN AMERICAN): >60 ML/MIN/1.73 M^2
ESTIMATED AVG GLUCOSE: 103 MG/DL (ref 68–131)
GIANT PLATELETS BLD QL SMEAR: PRESENT
GLUCOSE SERPL-MCNC: 93 MG/DL (ref 70–110)
HBA1C MFR BLD: 5.2 % (ref 4–5.6)
HCT VFR BLD AUTO: 45.6 % (ref 40–54)
HDLC SERPL-MCNC: 36 MG/DL (ref 40–75)
HDLC SERPL: 25.5 % (ref 20–50)
HGB BLD-MCNC: 15.1 G/DL (ref 14–18)
IMM GRANULOCYTES # BLD AUTO: 0.05 K/UL (ref 0–0.04)
IMM GRANULOCYTES NFR BLD AUTO: 1 % (ref 0–0.5)
LDLC SERPL CALC-MCNC: 88.4 MG/DL (ref 63–159)
LYMPHOCYTES # BLD AUTO: 1.6 K/UL (ref 1–4.8)
LYMPHOCYTES NFR BLD: 30.4 % (ref 18–48)
MCH RBC QN AUTO: 29.3 PG (ref 27–31)
MCHC RBC AUTO-ENTMCNC: 33.1 G/DL (ref 32–36)
MCV RBC AUTO: 89 FL (ref 82–98)
MONOCYTES # BLD AUTO: 0.3 K/UL (ref 0.3–1)
MONOCYTES NFR BLD: 6.1 % (ref 4–15)
NEUTROPHILS # BLD AUTO: 3.1 K/UL (ref 1.8–7.7)
NEUTROPHILS NFR BLD: 59.7 % (ref 38–73)
NONHDLC SERPL-MCNC: 105 MG/DL
NRBC BLD-RTO: 0 /100 WBC
OVALOCYTES BLD QL SMEAR: ABNORMAL
PLATELET # BLD AUTO: 209 K/UL (ref 150–450)
PLATELET BLD QL SMEAR: ABNORMAL
PMV BLD AUTO: 11 FL (ref 9.2–12.9)
POIKILOCYTOSIS BLD QL SMEAR: SLIGHT
POTASSIUM SERPL-SCNC: 4.6 MMOL/L (ref 3.5–5.1)
PROT SERPL-MCNC: 6.6 G/DL (ref 6–8.4)
RBC # BLD AUTO: 5.15 M/UL (ref 4.6–6.2)
SCHISTOCYTES BLD QL SMEAR: ABNORMAL
SODIUM SERPL-SCNC: 140 MMOL/L (ref 136–145)
T4 FREE SERPL-MCNC: 0.84 NG/DL (ref 0.71–1.51)
TRIGL SERPL-MCNC: 83 MG/DL (ref 30–150)
TSH SERPL DL<=0.005 MIU/L-ACNC: 1.81 UIU/ML (ref 0.4–4)
WBC # BLD AUTO: 5.1 K/UL (ref 3.9–12.7)

## 2022-06-03 PROCEDURE — 3075F SYST BP GE 130 - 139MM HG: CPT | Mod: CPTII,S$GLB,, | Performed by: FAMILY MEDICINE

## 2022-06-03 PROCEDURE — 84443 ASSAY THYROID STIM HORMONE: CPT | Performed by: FAMILY MEDICINE

## 2022-06-03 PROCEDURE — 80053 COMPREHEN METABOLIC PANEL: CPT | Performed by: FAMILY MEDICINE

## 2022-06-03 PROCEDURE — 36415 COLL VENOUS BLD VENIPUNCTURE: CPT | Mod: PO | Performed by: FAMILY MEDICINE

## 2022-06-03 PROCEDURE — 80061 LIPID PANEL: CPT | Performed by: FAMILY MEDICINE

## 2022-06-03 PROCEDURE — 1159F PR MEDICATION LIST DOCUMENTED IN MEDICAL RECORD: ICD-10-PCS | Mod: CPTII,S$GLB,, | Performed by: FAMILY MEDICINE

## 2022-06-03 PROCEDURE — 82306 VITAMIN D 25 HYDROXY: CPT | Performed by: FAMILY MEDICINE

## 2022-06-03 PROCEDURE — 84153 ASSAY OF PSA TOTAL: CPT | Performed by: FAMILY MEDICINE

## 2022-06-03 PROCEDURE — 84439 ASSAY OF FREE THYROXINE: CPT | Performed by: FAMILY MEDICINE

## 2022-06-03 PROCEDURE — 83036 HEMOGLOBIN GLYCOSYLATED A1C: CPT | Performed by: FAMILY MEDICINE

## 2022-06-03 PROCEDURE — 1160F PR REVIEW ALL MEDS BY PRESCRIBER/CLIN PHARMACIST DOCUMENTED: ICD-10-PCS | Mod: CPTII,S$GLB,, | Performed by: FAMILY MEDICINE

## 2022-06-03 PROCEDURE — 1159F MED LIST DOCD IN RCRD: CPT | Mod: CPTII,S$GLB,, | Performed by: FAMILY MEDICINE

## 2022-06-03 PROCEDURE — 3075F PR MOST RECENT SYSTOLIC BLOOD PRESS GE 130-139MM HG: ICD-10-PCS | Mod: CPTII,S$GLB,, | Performed by: FAMILY MEDICINE

## 2022-06-03 PROCEDURE — 3079F PR MOST RECENT DIASTOLIC BLOOD PRESSURE 80-89 MM HG: ICD-10-PCS | Mod: CPTII,S$GLB,, | Performed by: FAMILY MEDICINE

## 2022-06-03 PROCEDURE — 1160F RVW MEDS BY RX/DR IN RCRD: CPT | Mod: CPTII,S$GLB,, | Performed by: FAMILY MEDICINE

## 2022-06-03 PROCEDURE — 85025 COMPLETE CBC W/AUTO DIFF WBC: CPT | Performed by: FAMILY MEDICINE

## 2022-06-03 PROCEDURE — 99386 PREV VISIT NEW AGE 40-64: CPT | Mod: S$GLB,,, | Performed by: FAMILY MEDICINE

## 2022-06-03 PROCEDURE — 99999 PR PBB SHADOW E&M-EST. PATIENT-LVL IV: ICD-10-PCS | Mod: PBBFAC,,, | Performed by: FAMILY MEDICINE

## 2022-06-03 PROCEDURE — 3008F PR BODY MASS INDEX (BMI) DOCUMENTED: ICD-10-PCS | Mod: CPTII,S$GLB,, | Performed by: FAMILY MEDICINE

## 2022-06-03 PROCEDURE — 3079F DIAST BP 80-89 MM HG: CPT | Mod: CPTII,S$GLB,, | Performed by: FAMILY MEDICINE

## 2022-06-03 PROCEDURE — 99999 PR PBB SHADOW E&M-EST. PATIENT-LVL IV: CPT | Mod: PBBFAC,,, | Performed by: FAMILY MEDICINE

## 2022-06-03 PROCEDURE — 99386 PR PREVENTIVE VISIT,NEW,40-64: ICD-10-PCS | Mod: S$GLB,,, | Performed by: FAMILY MEDICINE

## 2022-06-03 PROCEDURE — 3008F BODY MASS INDEX DOCD: CPT | Mod: CPTII,S$GLB,, | Performed by: FAMILY MEDICINE

## 2022-06-03 RX ORDER — ATORVASTATIN CALCIUM 40 MG/1
40 TABLET, FILM COATED ORAL NIGHTLY
Qty: 90 TABLET | Refills: 3 | Status: SHIPPED | OUTPATIENT
Start: 2022-06-03 | End: 2023-05-29

## 2022-06-03 RX ORDER — FINASTERIDE 5 MG/1
5 TABLET, FILM COATED ORAL NIGHTLY
Qty: 90 TABLET | Refills: 3 | Status: SHIPPED | OUTPATIENT
Start: 2022-06-03 | End: 2023-06-07

## 2022-06-03 RX ORDER — ICOSAPENT ETHYL 1000 MG/1
1 CAPSULE ORAL NIGHTLY
Qty: 90 CAPSULE | Refills: 3 | Status: SHIPPED | OUTPATIENT
Start: 2022-06-03 | End: 2022-06-09 | Stop reason: SDUPTHER

## 2022-06-03 RX ORDER — PAROXETINE 10 MG/1
10 TABLET, FILM COATED ORAL NIGHTLY
Qty: 90 TABLET | Refills: 3 | Status: SHIPPED | OUTPATIENT
Start: 2022-06-03 | End: 2023-06-07 | Stop reason: SDUPTHER

## 2022-06-03 RX ORDER — OMEPRAZOLE 20 MG/1
20 CAPSULE, DELAYED RELEASE ORAL NIGHTLY
Qty: 90 CAPSULE | Refills: 3 | Status: SHIPPED | OUTPATIENT
Start: 2022-06-03 | End: 2023-06-07 | Stop reason: SDUPTHER

## 2022-06-03 NOTE — PROGRESS NOTES
Subjective:       Patient ID: Pietro Dietz is a 62 y.o. male.    Chief Complaint: Annual Exam and Establish Care    HPI 62-year-old white male former [facility] patient presents to clinic today to establish care.  He has underwent cardiovascular screen previously secondary to a strong family history of heart disease.  He currently remains stable on aspirin 81 mg daily, Lipitor 40 mg daily, and Vascepa 1 g nightly.  Anxiety remains stable on Paxil 10 mg nightly.  GERD remains stable on Prilosec 20 mg daily.  He has been followed by Urology in the past secondary to BPH.  At this time he remains stable on finasteride 5 mg daily.  He reports a past surgical history of appendectomy, LASIK, cosmetic surgery, and most recently open reduction internal fixation of a right calcaneus fracture.  He reports a strong family history of heart disease with his brother and father both having heart attacks and his father having a stroke in his 60s.  His mother had hypertension.  He is up-to-date with all vaccinations.  He is up-to-date with colonoscopy which was performed on August 13, 2018 with recommendation to repeat in 5 years.  Review of Systems   Constitutional: Negative for appetite change, chills, fatigue and fever.   HENT: Negative for nasal congestion, ear pain, hearing loss, postnasal drip, rhinorrhea, sinus pressure/congestion, sore throat and tinnitus.    Eyes: Negative for redness, itching and visual disturbance.   Respiratory: Negative for cough, chest tightness and shortness of breath.    Cardiovascular: Negative for chest pain and palpitations.   Gastrointestinal: Negative for abdominal pain, constipation, diarrhea, nausea and vomiting.   Genitourinary: Negative for decreased urine volume, difficulty urinating, dysuria, frequency, hematuria and urgency.   Musculoskeletal: Positive for neck pain (Cervical radiculopathy). Negative for back pain, myalgias and neck stiffness.   Integumentary:  Negative for rash.    Neurological: Negative for dizziness, light-headedness and headaches.   Psychiatric/Behavioral: Negative.          Objective:      Physical Exam  Vitals and nursing note reviewed.   Constitutional:       General: He is not in acute distress.     Appearance: He is well-developed. He is not diaphoretic.   HENT:      Head: Normocephalic and atraumatic.      Right Ear: External ear normal.      Left Ear: External ear normal.      Nose: Nose normal.      Mouth/Throat:      Pharynx: No oropharyngeal exudate.   Eyes:      General: No scleral icterus.        Right eye: No discharge.         Left eye: No discharge.      Conjunctiva/sclera: Conjunctivae normal.      Pupils: Pupils are equal, round, and reactive to light.   Neck:      Thyroid: No thyromegaly.      Vascular: No JVD.      Trachea: No tracheal deviation.   Cardiovascular:      Rate and Rhythm: Normal rate and regular rhythm.      Heart sounds: Normal heart sounds. No murmur heard.    No friction rub. No gallop.   Pulmonary:      Effort: Pulmonary effort is normal. No respiratory distress.      Breath sounds: Normal breath sounds. No stridor. No wheezing or rales.   Abdominal:      General: Bowel sounds are normal. There is no distension.      Palpations: Abdomen is soft. There is no mass.      Tenderness: There is no abdominal tenderness. There is no guarding or rebound.   Musculoskeletal:         General: No tenderness. Normal range of motion.      Cervical back: Normal range of motion and neck supple.   Lymphadenopathy:      Cervical: No cervical adenopathy.   Skin:     General: Skin is warm and dry.      Coloration: Skin is not pale.      Findings: No erythema or rash.   Neurological:      Mental Status: He is alert and oriented to person, place, and time.   Psychiatric:         Behavior: Behavior normal.         Thought Content: Thought content normal.         Judgment: Judgment normal.         Assessment:       Problem List Items Addressed This Visit      Family history of early CAD    Fatty liver    GERD (gastroesophageal reflux disease)    Relevant Medications    omeprazole (PRILOSEC) 20 MG capsule    Hyperlipidemia    Relevant Medications    VASCEPA 1 gram Cap    atorvastatin (LIPITOR) 40 MG tablet    Other Relevant Orders    Lipid Panel    Sleep apnea      Other Visit Diagnoses     Well adult exam    -  Primary    Relevant Orders    CBC Auto Differential    Comprehensive Metabolic Panel    Lipid Panel    T4, Free    TSH    Urinalysis    Vitamin D    Hemoglobin A1C    PSA, Screening    Anxiety        Relevant Medications    paroxetine (PAXIL) 10 MG tablet    Benign prostatic hyperplasia with urinary obstruction        Relevant Medications    finasteride (PROSCAR) 5 mg tablet    Prostate cancer screening        Relevant Orders    PSA, Screening          Plan:       1. CBC, CMP, UA, TSH, free T4, fasting lipids, vitamin-D level, hemoglobin A1c, and PSA.  2. Continue Lipitor 40 mg nightly and Vascepa 1 g nightly.  Hyperlipidemia remains stable.  3. Continue diet and exercise.  Fatty liver remains stable.  4. Continue use of CPAP nightly.  Sleep apnea remains stable.  5. Continue omeprazole 20 mg daily.  GERD is stable.  6. Continue Paxil 10 mg daily.  Anxiety is stable.  7. Continue finasteride 5 mg daily.  BPH is stable.  8. Return to clinic as needed or in 1 year for annual physical exam.

## 2022-06-08 ENCOUNTER — PATIENT OUTREACH (OUTPATIENT)
Dept: ADMINISTRATIVE | Facility: HOSPITAL | Age: 62
End: 2022-06-08
Payer: COMMERCIAL

## 2022-06-09 DIAGNOSIS — E78.49 OTHER HYPERLIPIDEMIA: ICD-10-CM

## 2022-06-09 RX ORDER — ICOSAPENT ETHYL 1000 MG/1
1 CAPSULE ORAL NIGHTLY
Qty: 90 CAPSULE | Refills: 3 | Status: SHIPPED | OUTPATIENT
Start: 2022-06-09 | End: 2022-08-07 | Stop reason: SDUPTHER

## 2022-06-09 NOTE — TELEPHONE ENCOUNTER
No new care gaps identified.  [facility] Embedded Care Gaps. Reference number: [id number]. 6/09/2022   3:21:32 PM CDT

## 2022-08-09 ENCOUNTER — TELEPHONE (OUTPATIENT)
Dept: INTERNAL MEDICINE | Facility: CLINIC | Age: 62
End: 2022-08-09
Payer: COMMERCIAL

## 2022-08-09 NOTE — TELEPHONE ENCOUNTER
Returned call in regards to :  Emily w/ [facility] script [phone] is calling to clarify an RX.    VASCEPA 1 gram Cap   What do they need to clarify:  there is a generic that would  save the pt $900 annually is the generic ok to ?   Comments:  Ref # [id number] This is a time sensitive case. Please call and advise    Told pharmacy the generic is fine

## 2022-08-09 NOTE — TELEPHONE ENCOUNTER
----- Message from Rhea Mantilla sent at 8/9/2022 11:41 AM CDT -----  Contact: Emily saunders/ [facility] script   Pharmacy is calling to clarify an RX.  RX name:  VASCEPA 1 gram Cap  What do they need to clarify:  there is a generic that would  save the pt $900 annually is the generic ok to ?   Comments:  Ref # [id number] This is a time sensitive case. Please call and advise.

## 2022-09-13 ENCOUNTER — HOSPITAL ENCOUNTER (EMERGENCY)
Facility: HOSPITAL | Age: 62
Discharge: HOME | End: 2022-09-14
Attending: EMERGENCY MEDICINE
Payer: COMMERCIAL

## 2022-09-13 DIAGNOSIS — H11.32 TRAUMATIC SUBCONJUNCTIVAL HEMORRHAGE OF LEFT EYE: Primary | ICD-10-CM

## 2022-09-13 LAB
BUN SERPL-MCNC: 22 MG/DL (ref 6–30)
CHLORIDE SERPL-SCNC: 102 MMOL/L (ref 95–110)
CREAT SERPL-MCNC: 1.1 MG/DL (ref 0.5–1.4)
GLUCOSE SERPL-MCNC: 93 MG/DL (ref 70–110)
HCT VFR BLD CALC: 47 %PCV (ref 36–54)
POC IONIZED CALCIUM: 1.29 MMOL/L (ref 1.06–1.42)
POC TCO2 (MEASURED): 25 MMOL/L (ref 23–29)
POTASSIUM BLD-SCNC: 4 MMOL/L (ref 3.5–5.1)
SAMPLE: NORMAL
SODIUM BLD-SCNC: 142 MMOL/L (ref 136–145)

## 2022-09-13 PROCEDURE — 99284 EMERGENCY DEPT VISIT MOD MDM: CPT | Mod: ,,, | Performed by: PHYSICIAN ASSISTANT

## 2022-09-13 PROCEDURE — 99285 EMERGENCY DEPT VISIT HI MDM: CPT | Mod: 25

## 2022-09-13 PROCEDURE — 25000003 PHARM REV CODE 250: Performed by: EMERGENCY MEDICINE

## 2022-09-13 PROCEDURE — 25500020 PHARM REV CODE 255: Performed by: EMERGENCY MEDICINE

## 2022-09-13 PROCEDURE — 99284 PR EMERGENCY DEPT VISIT,LEVEL IV: ICD-10-PCS | Mod: ,,, | Performed by: PHYSICIAN ASSISTANT

## 2022-09-13 RX ORDER — PROPARACAINE HYDROCHLORIDE 5 MG/ML
1 SOLUTION/ DROPS OPHTHALMIC
Status: COMPLETED | OUTPATIENT
Start: 2022-09-13 | End: 2022-09-13

## 2022-09-13 RX ADMIN — PROPARACAINE HYDROCHLORIDE 1 DROP: 5 SOLUTION/ DROPS OPHTHALMIC at 07:09

## 2022-09-13 RX ADMIN — FLUORESCEIN SODIUM 1 EACH: 1 STRIP OPHTHALMIC at 07:09

## 2022-09-13 RX ADMIN — IOHEXOL 100 ML: 350 INJECTION, SOLUTION INTRAVENOUS at 10:09

## 2022-09-14 ENCOUNTER — TELEPHONE (OUTPATIENT)
Dept: OPHTHALMOLOGY | Facility: CLINIC | Age: 62
End: 2022-09-14
Payer: COMMERCIAL

## 2022-09-14 VITALS
OXYGEN SATURATION: 96 % | DIASTOLIC BLOOD PRESSURE: 82 MMHG | RESPIRATION RATE: 16 BRPM | TEMPERATURE: 98 F | HEART RATE: 54 BPM | BODY MASS INDEX: 32.28 KG/M2 | SYSTOLIC BLOOD PRESSURE: 150 MMHG | WEIGHT: 200 LBS

## 2022-09-14 NOTE — ED NOTES
I-STAT Chem-8+ Results:   Value Reference Range   Sodium 142 136-145 mmol/L   Potassium  4.0 3.5-5.1 mmol/L   Chloride 102  mmol/L   Ionized Calcium 1.29 1.06-1.42 mmol/L   CO2 (measured) 25 23-29 mmol/L   Glucose 93  mg/dL   BUN 22 6-30 mg/dL   Creatinine 1.1 0.5-1.4 mg/dL   Hematocrit 47 36-54%

## 2022-09-14 NOTE — CONSULTS
"Consultation Report  Ophthalmology Service    Date: 09/13/2022    Chief complaint/Reason for Consult: "eye laceration/ subconjunctival hemorrhage"     History of Present Illness: Pietro Dietz is a 62 y.o. 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  Patient states that early this afternoon he was pulling weeds when he stood up and turned his head accidentally into a stationary shrub and his left lateral eye was poked by a shrub branch.  He has mild discomfort initially and quickly developed a large raised hematoma.  The pain has resolved and now only has FBS in the eye. No diplopia, bulging eye appearance, or pain with lateral gaze. Last meal around 1200 today. Takes baby ASA daily.     Patient denies any visual changes, visual disturbances, such as flashes, floaters, or curtain-veil in visual field, and ocular discomfort OU.    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 family history includes Heart disease in his father; Heart disease (age of onset: 66) in his brother; Hypertension in his mother; Stroke (age of onset: 60) in his father.     PMHx:  has a past medical history of Fatty liver, GERD (gastroesophageal reflux disease), Hyperlipidemia, and Sleep apnea.     PSurgHx:  has a past surgical history that includes Appendectomy; Cosmetic surgery; Vitrectomy; Open reduction and internal fixation (ORIF) of fracture of calcaneus (Right, 11/05/2021); and Eye surgery.     Home Medications:   Prior to Admission medications    Medication Sig Start Date End Date Taking? Authorizing Provider   atorvastatin (LIPITOR) 40 MG tablet Take 1 tablet (40 mg total) by mouth every evening. 6/3/22  Yes Nick Whittaker MD "   finasteride (PROSCAR) 5 mg tablet Take 1 tablet (5 mg total) by mouth every evening. 6/3/22 6/3/23 Yes Nick Whittaker MD   omeprazole (PRILOSEC) 20 MG capsule Take 1 capsule (20 mg total) by mouth every evening. 6/3/22  Yes Nick Whittaker MD   paroxetine (PAXIL) 10 MG tablet Take 1 tablet (10 mg total) by mouth every evening. 6/3/22 6/3/23 Yes Nick Whittaker MD   VASCEPA 1 gram Cap Take 1 capsule (1,000 mg total) by mouth every evening. 8/8/22 8/8/23 Yes Nick Whittaker MD   aspirin 81 MG Chew Take 1 tablet (81 mg total) by mouth 2 (two) times a day. 10/23/21 11/22/21  Ryan Goodson MD        Medications this encounter:     Allergies: is allergic to erythromycin.     Social:  reports that he has never smoked. He has never used smokeless tobacco. He reports current alcohol use of about 3.0 standard drinks per week. He reports that he does not use drugs.     ROS: As per HPI    Ocular examination/Dilated fundus examination:  Base Eye Exam       Visual Acuity (Snellen - Linear)         Right Left    Dist sc 20/30 20/30    Dist ph sc 20/25 20/25              Tonometry (Tonopen, 10:39 PM)         Right Left    Pressure 16 17              Pupils         Pupils Dark Light Shape React APD    Right PERRL 4 2 Round Brisk None    Left PERRL 4 2 Round Brisk None              Visual Fields         Right Left     Full Full              Extraocular Movement         Right Left     Full, Ortho Full, Ortho              Dilation       Both eyes: 1% Mydriacyl, 2.5% Phenylephrine @ 10:40 PM                  Slit Lamp and Fundus Exam       External Exam         Right Left    External Normal Normal              Slit Lamp Exam         Right Left    Lids/Lashes Normal Normal    Conjunctiva/Sclera White and quiet bullous subconj hemorrhage from 1230 to 6 o clock extending posteriorly to edge of sclera (see photographs), focal area of thick subconj blood protruding at 2-4 o clock near limbus otherwise hemorrhage is flat,  "no fluorescein uptake, no conj lac or visible sclera, no active bleeding    Cornea Clear Clear    Anterior Chamber Deep and quiet Deep and quiet, no hyphema    Iris Round and reactive Round and reactive    Lens Tr NS Tr NS    Anterior Vitreous Syneresis, no heme Syneresis, no heme              Fundus Exam         Right Left    Disc Normal, pink, sharp Normal, pink, sharp    C/D Ratio 0.1 0.1    Macula Normal, flat Normal, flat    Vessels Normal Normal    Periphery inferior lattice, no holes/tears 360 Superior lattice, no holes/tears 360. No commotio, heme, retinal changes overlying temporal retina at trauma focus                        CT Orbits:  -reviewed personally: globes well formed, no foreign body visible, small pocket of air next to chemotic conj, sub conj hemorrhage does not appear to extend posteriorly behind lateral orbital rim    Assessment/Plan:     1. Subconjunctival hemorrhage 2/2 Trauma, left eye  - Low velocity injury, turned head accidentally into a shrub  - Takes baby ASA daily  - VA stable and equal b/l, no pain only FBS  - No hyphema, peaked pupil, shallowing of AC, vit heme, retinal heme, whitening or internal signs of trauma  - No visible conj laceration, no conj staining  - CT orbits with well formed globes, no FB - air bubble reported by radiology to potentially be "in the anterior chamber" is in fact external to the cornea next to area of raised chemotic conjunctiva   - Strict RD and infection return precautions provided  - Avoid NSAIDs voluntarily   - can use artificial tears QID for comfort, or else OTC ophthalmic gel tears or loida (systane or refresh good brands)  - will plan to have patient follow up in eye clinic in 1 week    Discussed with Dr. Carr    Patient's Best Contact Number: [phone]    Dm Mccracken MD PGY-2  [employer] Ophthalmology Resident  09/13/2022  10:45 PM    I have reviewed the history and exam of the patient and agree with the resident's exam, assessment and " plan.

## 2022-09-14 NOTE — TELEPHONE ENCOUNTER
----- Message from Dm Mccracken MD sent at 9/13/2022 11:16 PM CDT -----  Regarding: ED f/u  Hello,    This patient was seen in the ED for a large traumatic subconjunctival hemorrhage.  Can we please contact him to schedule follow up here in the eye clinic in 1 week?    Thanks    Dm Mccracken  [facility] Ophthalmology

## 2022-09-14 NOTE — ED PROVIDER NOTES
"Encounter Date: 9/13/2022       History     Chief Complaint   Patient presents with    Eye Problem     Reports being poked in the eye by a branch. Redness noted to the left eye. Denies vision changes.     62-year-old male with GERD, hyperlipidemia, fatty liver presents irritation in his left eye after being scratched by a branch in a bush.  He reports associated redness of the eye.  He denies changes, photophobia, discharge, headache or other complaints.  Was wearing contacts at the time of the injury and removed them.  Last Tdap 2019.  He takes baby aspirin, no other blood thinners.    Review of patient's allergies indicates:   Allergen Reactions    Erythromycin Other (See Comments)     Causes "stomach burning"     Past Medical History:   Diagnosis Date    Fatty liver     GERD (gastroesophageal reflux disease)     Hyperlipidemia     Sleep apnea      Past Surgical History:   Procedure Laterality Date    APPENDECTOMY      5 years old    COSMETIC SURGERY      EYE SURGERY      Lasik    OPEN REDUCTION AND INTERNAL FIXATION (ORIF) OF FRACTURE OF CALCANEUS Right 11/05/2021    Procedure: ORIF, FRACTURE, CALCANEUS;  Surgeon: Nichole Ortiz MD;  Location: [facility] OR [address] [place] [address];  Service: Orthopedics;  Laterality: Right;    VITRECTOMY       Family History   Problem Relation Age of Onset    Hypertension Mother     Stroke Father 60    Heart disease Father     Heart disease Brother 66    Prostate cancer Neg Hx     Kidney disease Neg Hx      Social History     Tobacco Use    Smoking status: Never    Smokeless tobacco: Never   Substance Use Topics    Alcohol use: Yes     Alcohol/week: 3.0 standard drinks     Types: 3 Shots of liquor per week    Drug use: No     Review of Systems   Constitutional:  Negative for fever.   Eyes:  Positive for pain and redness. Negative for photophobia, discharge, itching and visual disturbance.   Respiratory:  Negative for shortness of breath.    Cardiovascular:  Negative for chest pain.   Gastrointestinal: "  Negative for nausea and vomiting.   Musculoskeletal:  Negative for back pain.   Skin:  Negative for rash.   Allergic/Immunologic: Negative for immunocompromised state.   Neurological:  Negative for weakness and headaches.   Hematological:  Does not bruise/bleed easily.     Physical Exam     Initial Vitals [09/13/22 1747]   BP Pulse Resp Temp SpO2   (!) 162/84 71 16 98.5 °F (36.9 °C) 97 %      MAP       --         Physical Exam    Nursing note and vitals reviewed.  Constitutional: He appears well-developed and well-nourished. He is not diaphoretic. No distress.   HENT:   Head: Normocephalic and atraumatic.   Eyes: EOM and lids are normal. Pupils are equal, round, and reactive to light. Lids are everted and swept, no foreign bodies found. Right eye exhibits no chemosis. Left eye exhibits no chemosis. Right conjunctiva is not injected. Right conjunctiva has no hemorrhage. Left conjunctiva is not injected. Left conjunctiva has a hemorrhage.   Subconjunctival hemorrhage of the left lateral eye with some swelling.  No visible laceration.  No hyphema.  Normal pupils   Neck: Neck supple.   Normal range of motion.  Musculoskeletal:         General: Normal range of motion.      Cervical back: Normal range of motion and neck supple.     Neurological: He is alert and oriented to person, place, and time.   Skin: Skin is warm and dry.   Psychiatric: He has a normal mood and affect.           ED Course   Procedures  Labs Reviewed   ISTAT PROCEDURE          Imaging Results              CT ORBITS WITH CONTRAST (In process)                      Medications   iohexoL (OMNIPAQUE 350) injection 100 mL (has no administration in time range)   proparacaine 0.5 % ophthalmic solution 1 drop (1 drop Both Eyes Given 9/13/22 1933)   fluorescein ophthalmic strip 1 each (1 each Both Eyes Given 9/13/22 1933)     Medical Decision Making:   History:   Old Medical Records: I decided to obtain old medical records.  Old Records Summarized: records  from clinic visits.       <> Summary of Records: No recent ED visits or admissions  Initial Assessment:   62-year-old male presenting for irritation and redness of his after being scratched by a bush.  Hypertensive at 162/84 with otherwise normal vitals.  Visual acuity 20/25 OS, 20/20 OD  Differential Diagnosis:   Subconjunctival hemorrhage  Conjunctiva laceration  Retained foreign body  Corneal abrasion  Clinical Tests:   Radiological Study: Ordered  ED Management:  No fluorescein uptake on fluorescein exam.  I am unable to visualize a laceration.  I discussed this patient with Ophthalmology who evaluated the patient, dispo pending CT of the orbits.  I discussed this patient with my supervising physician who also evaluated the patient and I am signing out to Milana Carver MD.    10:56 PM  Sandee Torres PA-C    Other:   I have discussed this case with another health care provider.       <> Summary of the Discussion: See ED course          Attending Attestation:     Physician Attestation Statement for NP/PA:   I have conducted a face to face encounter with this patient in addition to the NP/PA, due to Medical Complexity    Other NP/PA Attestation Additions:      Medical Decision Makin yo M w/ left eye injury and subconj hemorrhage    On exam, possible conjunctiva superficial lac, ophthalmology consulted    CT orbits  Tetanus    CT results reviewed with opthalmology: no air visualized on the exam in the ant chamber, will f/u the patient in the clinic in one week           ED Course as of 09/13/22 2332   Tue Sep 13, 2022   2109 I discussed this patient with Ophthalmology who will come evaluate the patient and recommend obtaining CT of the orbits [CC]   2141 POC Creatinine: 1.1 [CC]   2235 Patient evaluated at bedside by Ophthalmology.  Dispo pending CT but likely discharge with antibiotic eye ointment if no concerning findings on CT [CC]      ED Course User Index  [CC] Sandee Torres PA-C                    Clinical Impression:   Final diagnoses:  [H11.32] Traumatic subconjunctival hemorrhage of left eye (Primary)             Sandee Torres PA-C  09/13/22 8821       Milana Carver MD  09/13/22 [id number]

## 2022-09-14 NOTE — ED TRIAGE NOTES
"Pietro Dietz, a 62 y.o. male presents to the ED w/ complaint of left eye injury. Pt reported branch poking him the eye while pulling weeds. Denies vision changes.     Triage note:  Chief Complaint   Patient presents with    Eye Problem     Reports being poked in the eye by a branch. Redness noted to the left eye. Denies vision changes.     Review of patient's allergies indicates:   Allergen Reactions    Erythromycin Other (See Comments)     Causes "stomach burning"     Past Medical History:   Diagnosis Date    Fatty liver     GERD (gastroesophageal reflux disease)     Hyperlipidemia     Sleep apnea        "

## 2022-09-14 NOTE — TELEPHONE ENCOUNTER
Left message stating I was returning phone call to schedule [facility] f/u appt.      * I did prescheduled appt for when pt returns call, waiting for pt to confirm date/time*

## 2022-09-21 ENCOUNTER — OFFICE VISIT (OUTPATIENT)
Dept: OPHTHALMOLOGY | Facility: CLINIC | Age: 62
End: 2022-09-21
Payer: COMMERCIAL

## 2022-09-21 DIAGNOSIS — H11.32 SUBCONJUNCTIVAL HEMORRHAGE OF LEFT EYE: Primary | ICD-10-CM

## 2022-09-21 PROCEDURE — 3044F PR MOST RECENT HEMOGLOBIN A1C LEVEL <7.0%: ICD-10-PCS | Mod: CPTII,S$GLB,, | Performed by: OPHTHALMOLOGY

## 2022-09-21 PROCEDURE — 1160F PR REVIEW ALL MEDS BY PRESCRIBER/CLIN PHARMACIST DOCUMENTED: ICD-10-PCS | Mod: CPTII,S$GLB,, | Performed by: OPHTHALMOLOGY

## 2022-09-21 PROCEDURE — 3044F HG A1C LEVEL LT 7.0%: CPT | Mod: CPTII,S$GLB,, | Performed by: OPHTHALMOLOGY

## 2022-09-21 PROCEDURE — 99999 PR PBB SHADOW E&M-EST. PATIENT-LVL III: CPT | Mod: PBBFAC,,, | Performed by: OPHTHALMOLOGY

## 2022-09-21 PROCEDURE — 99202 PR OFFICE/OUTPT VISIT, NEW, LEVL II, 15-29 MIN: ICD-10-PCS | Mod: S$GLB,,, | Performed by: OPHTHALMOLOGY

## 2022-09-21 PROCEDURE — 99202 OFFICE O/P NEW SF 15 MIN: CPT | Mod: S$GLB,,, | Performed by: OPHTHALMOLOGY

## 2022-09-21 PROCEDURE — 1160F RVW MEDS BY RX/DR IN RCRD: CPT | Mod: CPTII,S$GLB,, | Performed by: OPHTHALMOLOGY

## 2022-09-21 PROCEDURE — 1159F MED LIST DOCD IN RCRD: CPT | Mod: CPTII,S$GLB,, | Performed by: OPHTHALMOLOGY

## 2022-09-21 PROCEDURE — 1159F PR MEDICATION LIST DOCUMENTED IN MEDICAL RECORD: ICD-10-PCS | Mod: CPTII,S$GLB,, | Performed by: OPHTHALMOLOGY

## 2022-09-21 PROCEDURE — 99999 PR PBB SHADOW E&M-EST. PATIENT-LVL III: ICD-10-PCS | Mod: PBBFAC,,, | Performed by: OPHTHALMOLOGY

## 2022-11-03 ENCOUNTER — PATIENT MESSAGE (OUTPATIENT)
Dept: SLEEP MEDICINE | Facility: CLINIC | Age: 62
End: 2022-11-03
Payer: COMMERCIAL

## 2022-11-04 ENCOUNTER — TELEPHONE (OUTPATIENT)
Dept: SLEEP MEDICINE | Facility: CLINIC | Age: 62
End: 2022-11-04
Payer: COMMERCIAL

## 2022-11-04 DIAGNOSIS — G47.33 OSA (OBSTRUCTIVE SLEEP APNEA): Primary | ICD-10-CM

## 2023-03-08 ENCOUNTER — PATIENT MESSAGE (OUTPATIENT)
Dept: INTERNAL MEDICINE | Facility: CLINIC | Age: 63
End: 2023-03-08
Payer: COMMERCIAL

## 2023-03-08 DIAGNOSIS — Z00.00 WELL ADULT EXAM: Primary | ICD-10-CM

## 2023-03-14 ENCOUNTER — PATIENT MESSAGE (OUTPATIENT)
Dept: INTERNAL MEDICINE | Facility: CLINIC | Age: 63
End: 2023-03-14
Payer: COMMERCIAL

## 2023-05-23 DIAGNOSIS — E78.49 OTHER HYPERLIPIDEMIA: ICD-10-CM

## 2023-05-24 RX ORDER — ICOSAPENT ETHYL 1000 MG/1
1 CAPSULE ORAL NIGHTLY
Qty: 90 CAPSULE | Refills: 3 | Status: SHIPPED | OUTPATIENT
Start: 2023-05-24 | End: 2023-10-17

## 2023-05-24 NOTE — TELEPHONE ENCOUNTER
Care Due:                  Date            Visit Type   Department     Provider  --------------------------------------------------------------------------------                                NP -                              PRIMARY      [facility] INTERNAL  Last Visit: 06-      CARE ([facility])   MEDICINE       Nick Whittaker                              MYCHART                              ANNUAL                              CHECKUP/PHY  [facility] INTERNAL  Next Visit: 06-      [facility]       Nick Whittaker                                                            Last  Test          Frequency    Reason                     Performed    Due Date  --------------------------------------------------------------------------------    CMP.........  12 months..  VASCEPA, atorvastatin....  06- 05-    Lipid Panel.  12 months..  VASCEPA, atorvastatin....  06- 05-    [employer] Embedded Care Due Messages. Reference number: [id number].   5/23/2023 9:45:08 PM CDT

## 2023-05-26 ENCOUNTER — LAB VISIT (OUTPATIENT)
Dept: LAB | Facility: HOSPITAL | Age: 63
End: 2023-05-26
Attending: FAMILY MEDICINE
Payer: COMMERCIAL

## 2023-05-26 DIAGNOSIS — Z00.00 WELL ADULT EXAM: ICD-10-CM

## 2023-05-26 LAB
ALBUMIN SERPL BCP-MCNC: 4 G/DL (ref 3.5–5.2)
ALP SERPL-CCNC: 98 U/L (ref 55–135)
ALT SERPL W/O P-5'-P-CCNC: 48 U/L (ref 10–44)
ANION GAP SERPL CALC-SCNC: 11 MMOL/L (ref 8–16)
AST SERPL-CCNC: 29 U/L (ref 10–40)
BASOPHILS # BLD AUTO: 0.02 K/UL (ref 0–0.2)
BASOPHILS NFR BLD: 0.4 % (ref 0–1.9)
BILIRUB SERPL-MCNC: 0.9 MG/DL (ref 0.1–1)
BUN SERPL-MCNC: 23 MG/DL (ref 8–23)
CALCIUM SERPL-MCNC: 9.6 MG/DL (ref 8.7–10.5)
CHLORIDE SERPL-SCNC: 108 MMOL/L (ref 95–110)
CO2 SERPL-SCNC: 22 MMOL/L (ref 23–29)
COMPLEXED PSA SERPL-MCNC: 2.7 NG/ML (ref 0–4)
CREAT SERPL-MCNC: 1.3 MG/DL (ref 0.5–1.4)
DIFFERENTIAL METHOD: ABNORMAL
EOSINOPHIL # BLD AUTO: 0.1 K/UL (ref 0–0.5)
EOSINOPHIL NFR BLD: 2.5 % (ref 0–8)
ERYTHROCYTE [DISTWIDTH] IN BLOOD BY AUTOMATED COUNT: 14 % (ref 11.5–14.5)
EST. GFR  (NO RACE VARIABLE): >60 ML/MIN/1.73 M^2
ESTIMATED AVG GLUCOSE: 103 MG/DL (ref 68–131)
GLUCOSE SERPL-MCNC: 101 MG/DL (ref 70–110)
HBA1C MFR BLD: 5.2 % (ref 4–5.6)
HCT VFR BLD AUTO: 45.4 % (ref 40–54)
HGB BLD-MCNC: 15.6 G/DL (ref 14–18)
IMM GRANULOCYTES # BLD AUTO: 0.01 K/UL (ref 0–0.04)
IMM GRANULOCYTES NFR BLD AUTO: 0.2 % (ref 0–0.5)
LYMPHOCYTES # BLD AUTO: 1.2 K/UL (ref 1–4.8)
LYMPHOCYTES NFR BLD: 22.5 % (ref 18–48)
MCH RBC QN AUTO: 32.1 PG (ref 27–31)
MCHC RBC AUTO-ENTMCNC: 34.4 G/DL (ref 32–36)
MCV RBC AUTO: 93 FL (ref 82–98)
MONOCYTES # BLD AUTO: 0.4 K/UL (ref 0.3–1)
MONOCYTES NFR BLD: 8 % (ref 4–15)
NEUTROPHILS # BLD AUTO: 3.5 K/UL (ref 1.8–7.7)
NEUTROPHILS NFR BLD: 66.4 % (ref 38–73)
NRBC BLD-RTO: 0 /100 WBC
PLATELET # BLD AUTO: 209 K/UL (ref 150–450)
PMV BLD AUTO: 12.2 FL (ref 9.2–12.9)
POTASSIUM SERPL-SCNC: 4.4 MMOL/L (ref 3.5–5.1)
PROT SERPL-MCNC: 6.7 G/DL (ref 6–8.4)
RBC # BLD AUTO: 4.86 M/UL (ref 4.6–6.2)
SODIUM SERPL-SCNC: 141 MMOL/L (ref 136–145)
T4 FREE SERPL-MCNC: 0.75 NG/DL (ref 0.71–1.51)
TSH SERPL DL<=0.005 MIU/L-ACNC: 1.43 UIU/ML (ref 0.4–4)
WBC # BLD AUTO: 5.24 K/UL (ref 3.9–12.7)

## 2023-05-26 PROCEDURE — 84443 ASSAY THYROID STIM HORMONE: CPT | Performed by: FAMILY MEDICINE

## 2023-05-26 PROCEDURE — 36415 COLL VENOUS BLD VENIPUNCTURE: CPT | Mod: PO | Performed by: FAMILY MEDICINE

## 2023-05-26 PROCEDURE — 83036 HEMOGLOBIN GLYCOSYLATED A1C: CPT | Performed by: FAMILY MEDICINE

## 2023-05-26 PROCEDURE — 80053 COMPREHEN METABOLIC PANEL: CPT | Performed by: FAMILY MEDICINE

## 2023-05-26 PROCEDURE — 84153 ASSAY OF PSA TOTAL: CPT | Performed by: FAMILY MEDICINE

## 2023-05-26 PROCEDURE — 84439 ASSAY OF FREE THYROXINE: CPT | Performed by: FAMILY MEDICINE

## 2023-05-26 PROCEDURE — 85025 COMPLETE CBC W/AUTO DIFF WBC: CPT | Performed by: FAMILY MEDICINE

## 2023-06-07 ENCOUNTER — OFFICE VISIT (OUTPATIENT)
Dept: INTERNAL MEDICINE | Facility: CLINIC | Age: 63
End: 2023-06-07
Payer: COMMERCIAL

## 2023-06-07 VITALS
TEMPERATURE: 98 F | RESPIRATION RATE: 16 BRPM | SYSTOLIC BLOOD PRESSURE: 136 MMHG | BODY MASS INDEX: 33.94 KG/M2 | HEIGHT: 66 IN | OXYGEN SATURATION: 95 % | DIASTOLIC BLOOD PRESSURE: 72 MMHG | HEART RATE: 71 BPM | WEIGHT: 211.19 LBS

## 2023-06-07 DIAGNOSIS — N13.8 BENIGN PROSTATIC HYPERPLASIA WITH URINARY OBSTRUCTION: ICD-10-CM

## 2023-06-07 DIAGNOSIS — K76.0 FATTY LIVER: ICD-10-CM

## 2023-06-07 DIAGNOSIS — N40.1 BENIGN PROSTATIC HYPERPLASIA WITH URINARY OBSTRUCTION: ICD-10-CM

## 2023-06-07 DIAGNOSIS — F41.9 ANXIETY: ICD-10-CM

## 2023-06-07 DIAGNOSIS — G47.33 OBSTRUCTIVE SLEEP APNEA SYNDROME: ICD-10-CM

## 2023-06-07 DIAGNOSIS — K21.9 GASTROESOPHAGEAL REFLUX DISEASE WITHOUT ESOPHAGITIS: ICD-10-CM

## 2023-06-07 DIAGNOSIS — E78.49 OTHER HYPERLIPIDEMIA: ICD-10-CM

## 2023-06-07 DIAGNOSIS — Z82.49 FAMILY HISTORY OF EARLY CAD: ICD-10-CM

## 2023-06-07 DIAGNOSIS — Z00.00 WELL ADULT EXAM: Primary | ICD-10-CM

## 2023-06-07 PROCEDURE — 3078F DIAST BP <80 MM HG: CPT | Mod: CPTII,S$GLB,, | Performed by: FAMILY MEDICINE

## 2023-06-07 PROCEDURE — 3075F PR MOST RECENT SYSTOLIC BLOOD PRESS GE 130-139MM HG: ICD-10-PCS | Mod: CPTII,S$GLB,, | Performed by: FAMILY MEDICINE

## 2023-06-07 PROCEDURE — 3008F PR BODY MASS INDEX (BMI) DOCUMENTED: ICD-10-PCS | Mod: CPTII,S$GLB,, | Performed by: FAMILY MEDICINE

## 2023-06-07 PROCEDURE — 1160F RVW MEDS BY RX/DR IN RCRD: CPT | Mod: CPTII,S$GLB,, | Performed by: FAMILY MEDICINE

## 2023-06-07 PROCEDURE — 1159F MED LIST DOCD IN RCRD: CPT | Mod: CPTII,S$GLB,, | Performed by: FAMILY MEDICINE

## 2023-06-07 PROCEDURE — 99396 PR PREVENTIVE VISIT,EST,40-64: ICD-10-PCS | Mod: S$GLB,,, | Performed by: FAMILY MEDICINE

## 2023-06-07 PROCEDURE — 1160F PR REVIEW ALL MEDS BY PRESCRIBER/CLIN PHARMACIST DOCUMENTED: ICD-10-PCS | Mod: CPTII,S$GLB,, | Performed by: FAMILY MEDICINE

## 2023-06-07 PROCEDURE — 99999 PR PBB SHADOW E&M-EST. PATIENT-LVL IV: ICD-10-PCS | Mod: PBBFAC,,, | Performed by: FAMILY MEDICINE

## 2023-06-07 PROCEDURE — 99396 PREV VISIT EST AGE 40-64: CPT | Mod: S$GLB,,, | Performed by: FAMILY MEDICINE

## 2023-06-07 PROCEDURE — 1159F PR MEDICATION LIST DOCUMENTED IN MEDICAL RECORD: ICD-10-PCS | Mod: CPTII,S$GLB,, | Performed by: FAMILY MEDICINE

## 2023-06-07 PROCEDURE — 3008F BODY MASS INDEX DOCD: CPT | Mod: CPTII,S$GLB,, | Performed by: FAMILY MEDICINE

## 2023-06-07 PROCEDURE — 99999 PR PBB SHADOW E&M-EST. PATIENT-LVL IV: CPT | Mod: PBBFAC,,, | Performed by: FAMILY MEDICINE

## 2023-06-07 PROCEDURE — 3075F SYST BP GE 130 - 139MM HG: CPT | Mod: CPTII,S$GLB,, | Performed by: FAMILY MEDICINE

## 2023-06-07 PROCEDURE — 3044F PR MOST RECENT HEMOGLOBIN A1C LEVEL <7.0%: ICD-10-PCS | Mod: CPTII,S$GLB,, | Performed by: FAMILY MEDICINE

## 2023-06-07 PROCEDURE — 3078F PR MOST RECENT DIASTOLIC BLOOD PRESSURE < 80 MM HG: ICD-10-PCS | Mod: CPTII,S$GLB,, | Performed by: FAMILY MEDICINE

## 2023-06-07 PROCEDURE — 3044F HG A1C LEVEL LT 7.0%: CPT | Mod: CPTII,S$GLB,, | Performed by: FAMILY MEDICINE

## 2023-06-07 RX ORDER — OMEPRAZOLE 20 MG/1
20 CAPSULE, DELAYED RELEASE ORAL NIGHTLY
Qty: 90 CAPSULE | Refills: 3 | Status: SHIPPED | OUTPATIENT
Start: 2023-06-07 | End: 2023-08-02

## 2023-06-07 RX ORDER — TADALAFIL 5 MG/1
5 TABLET ORAL DAILY
Qty: 90 TABLET | Refills: 3 | Status: SHIPPED | OUTPATIENT
Start: 2023-06-07 | End: 2023-07-06

## 2023-06-07 RX ORDER — PAROXETINE 10 MG/1
10 TABLET, FILM COATED ORAL NIGHTLY
Qty: 90 TABLET | Refills: 3 | Status: SHIPPED | OUTPATIENT
Start: 2023-06-07 | End: 2023-08-02

## 2023-06-07 NOTE — PROGRESS NOTES
Subjective     Patient ID: Pietro Dietz is a 63 y.o. male.    Chief Complaint: Annual Exam    HPI 63-year-old white male presents to clinic today for annual physical exam.  He has underwent cardiovascular screening previously secondary to a strong family history of heart disease.  He has underwent a coronary calcium score which has returned elevated.  He was started on aspirin 81 mg daily, Lipitor 40 mg daily, and Vascepa 1 g nightly with stability.  Anxiety remains stable on Paxil 10 mg nightly.  GERD remains well controlled on omeprazole 20 mg daily.  He has been followed by Urology in the past for treatment of BPH.  He has been prescribed Flomax and Proscar in the past both of which caused retrograde ejaculation with resultant testicular pain.  At this time he is interested in further treatment recommendations as he still notes urinary flow weakness.  I have recommended starting Cialis 5 mg daily for treatment.  He has been noted to have fatty liver which remains stable.  He has a past surgical history of appendectomy, LASIK, cosmetic surgery, and open reduction and internal fixation of a right calcaneus fracture.  He reports a strong family history of heart disease with his brother and father both having heart attacks at the age of 64 his 60s.  His mother has hypertension.  He is up-to-date with all vaccinations except shingles vaccination which has been discussed.  Colonoscopy has been discussed and will be ordered after the patient turns 64.  Review of Systems   Constitutional:  Negative for activity change, appetite change, chills, fatigue, fever and unexpected weight change.   HENT:  Negative for nasal congestion, ear pain, hearing loss, postnasal drip, rhinorrhea, sinus pressure/congestion, sore throat, tinnitus and trouble swallowing.    Eyes:  Negative for discharge, redness, itching and visual disturbance.   Respiratory:  Negative for cough, chest tightness, shortness of breath and wheezing.     Cardiovascular:  Negative for chest pain and palpitations.   Gastrointestinal:  Negative for abdominal pain, blood in stool, constipation, diarrhea, nausea and vomiting.   Endocrine: Negative for polydipsia and polyuria.   Genitourinary:  Positive for difficulty urinating (weak stream) and erectile dysfunction. Negative for decreased urine volume, dysuria, frequency, hematuria and urgency.   Musculoskeletal:  Negative for arthralgias, back pain, joint swelling, myalgias, neck pain and neck stiffness.   Integumentary:  Negative for rash.   Neurological:  Negative for dizziness, weakness, light-headedness and headaches.   Psychiatric/Behavioral: Negative.  Negative for confusion and dysphoric mood.         Objective     Physical Exam  Vitals and nursing note reviewed.   Constitutional:       General: He is not in acute distress.     Appearance: Normal appearance. He is well-developed. He is not diaphoretic.   HENT:      Head: Normocephalic and atraumatic.      Right Ear: External ear normal.      Left Ear: External ear normal.      Nose: Nose normal.      Mouth/Throat:      Pharynx: No oropharyngeal exudate.   Eyes:      General: No scleral icterus.        Right eye: No discharge.         Left eye: No discharge.      Conjunctiva/sclera: Conjunctivae normal.      Pupils: Pupils are equal, round, and reactive to light.   Neck:      Thyroid: No thyromegaly.      Vascular: No JVD.      Trachea: No tracheal deviation.   Cardiovascular:      Rate and Rhythm: Normal rate and regular rhythm.      Heart sounds: Normal heart sounds. No murmur heard.    No friction rub. No gallop.   Pulmonary:      Effort: Pulmonary effort is normal. No respiratory distress.      Breath sounds: Normal breath sounds. No stridor. No wheezing, rhonchi or rales.   Chest:      Chest wall: No tenderness.   Abdominal:      General: Bowel sounds are normal. There is no distension.      Palpations: Abdomen is soft. There is no mass.      Tenderness:  There is no abdominal tenderness. There is no guarding or rebound.   Musculoskeletal:         General: No tenderness. Normal range of motion.      Cervical back: Normal range of motion and neck supple.   Lymphadenopathy:      Cervical: No cervical adenopathy.   Skin:     General: Skin is warm and dry.      Coloration: Skin is not pale.      Findings: No erythema or rash.   Neurological:      Mental Status: He is alert and oriented to person, place, and time.   Psychiatric:         Mood and Affect: Mood normal.         Behavior: Behavior normal.         Thought Content: Thought content normal.         Judgment: Judgment normal.          Assessment and Plan     1. Well adult exam  -     Lipid Panel; Future; Expected date: 06/07/2023    2. Other hyperlipidemia  -     Ambulatory referral/consult to Cardiology; Future; Expected date: 06/14/2023  -     Lipid Panel; Future; Expected date: 06/07/2023    3. Gastroesophageal reflux disease without esophagitis  -     omeprazole (PRILOSEC) 20 MG capsule; Take 1 capsule (20 mg total) by mouth every evening.  Dispense: 90 capsule; Refill: 3    4. Obstructive sleep apnea syndrome    5. Anxiety  -     paroxetine (PAXIL) 10 MG tablet; Take 1 tablet (10 mg total) by mouth every evening.  Dispense: 90 tablet; Refill: 3    6. Benign prostatic hyperplasia with urinary obstruction  -     tadalafiL (CIALIS) 5 MG tablet; Take 1 tablet (5 mg total) by mouth once daily.  Dispense: 90 tablet; Refill: 3    7. Fatty liver    8. Family history of early CAD  -     Ambulatory referral/consult to Cardiology; Future; Expected date: 06/14/2023        1. Labs have been reviewed and are overall within normal limits.  2. Continue Lipitor 40 mg daily and Vascepa 1 g nightly.  Hyperlipidemia stable.  3. Continue omeprazole 20 mg nightly.  GERD is well controlled.  4. Continue use of CPAP nightly.  Sleep apnea is well controlled.  5. Continue Paxil 10 mg nightly.  Anxiety is stable.    6. Recommend  starting Cialis 5 mg daily for continued treatment of BPH as the patient has had retrograde ejaculation from both Flomax and Proscar in the past.  7. Continue diet and exercise.  Fatty liver remains stable.  8. Refer to cardiology for further evaluation and treatment recommendations secondary to strong family history of heart disease.    9. Return to clinic as needed or in 1 year for annual physical exam.             Follow up in about 1 year (around 6/7/2024), or if symptoms worsen or fail to improve, for Annual exam.

## 2023-06-09 ENCOUNTER — PATIENT MESSAGE (OUTPATIENT)
Dept: INTERNAL MEDICINE | Facility: CLINIC | Age: 63
End: 2023-06-09
Payer: COMMERCIAL

## 2023-06-09 ENCOUNTER — TELEPHONE (OUTPATIENT)
Dept: INTERNAL MEDICINE | Facility: CLINIC | Age: 63
End: 2023-06-09
Payer: COMMERCIAL

## 2023-06-09 ENCOUNTER — LAB VISIT (OUTPATIENT)
Dept: LAB | Facility: HOSPITAL | Age: 63
End: 2023-06-09
Attending: FAMILY MEDICINE
Payer: COMMERCIAL

## 2023-06-09 DIAGNOSIS — Z00.00 WELL ADULT EXAM: ICD-10-CM

## 2023-06-09 DIAGNOSIS — E78.49 OTHER HYPERLIPIDEMIA: ICD-10-CM

## 2023-06-09 LAB
CHOLEST SERPL-MCNC: 168 MG/DL (ref 120–199)
CHOLEST/HDLC SERPL: 3.6 {RATIO} (ref 2–5)
HDLC SERPL-MCNC: 47 MG/DL (ref 40–75)
HDLC SERPL: 28 % (ref 20–50)
LDLC SERPL CALC-MCNC: 105.6 MG/DL (ref 63–159)
NONHDLC SERPL-MCNC: 121 MG/DL
TRIGL SERPL-MCNC: 77 MG/DL (ref 30–150)

## 2023-06-09 PROCEDURE — 36415 COLL VENOUS BLD VENIPUNCTURE: CPT | Mod: PO | Performed by: FAMILY MEDICINE

## 2023-06-09 PROCEDURE — 80061 LIPID PANEL: CPT | Performed by: FAMILY MEDICINE

## 2023-06-09 NOTE — TELEPHONE ENCOUNTER
I spoke to pt, he was notified PA for Cialis was initiated and we are waiting on a response.  He will be notified once a response have been received.  Pt verbalized understanding

## 2023-06-09 NOTE — TELEPHONE ENCOUNTER
----- Message from Regina Noriega sent at 6/9/2023  3:15 PM CDT -----  Contact: [phone] patient  Patient is returning a phone call.  Who left a message for the patient: Stacie Fried LPN  Does patient know what this is regarding:  PA for Rx   Would you like a call back, or a response through your [facility] portal?:   call back   Comments:

## 2023-06-09 NOTE — TELEPHONE ENCOUNTER
We received a fax request from [employer] that pt's Rx requires PA from insurance carrier.  Key code [id number]   Pt not found in [employer].  PA requested through [employer]. Waiting for response.

## 2023-07-05 ENCOUNTER — TELEPHONE (OUTPATIENT)
Dept: INTERNAL MEDICINE | Facility: CLINIC | Age: 63
End: 2023-07-05
Payer: COMMERCIAL

## 2023-07-05 DIAGNOSIS — R05.3 CHRONIC COUGH: Primary | ICD-10-CM

## 2023-07-05 DIAGNOSIS — J30.9 ALLERGIC RHINITIS, UNSPECIFIED SEASONALITY, UNSPECIFIED TRIGGER: ICD-10-CM

## 2023-07-05 NOTE — TELEPHONE ENCOUNTER
I have referred the patient to ENT for further evaluation of a lingering cough, nasal congestion, and runny nose that has been ongoing for the past few months.  Please schedule and inform the patient.  Thank you.

## 2023-07-06 ENCOUNTER — LAB VISIT (OUTPATIENT)
Dept: LAB | Facility: HOSPITAL | Age: 63
End: 2023-07-06
Attending: PHYSICIAN ASSISTANT
Payer: COMMERCIAL

## 2023-07-06 ENCOUNTER — PATIENT MESSAGE (OUTPATIENT)
Dept: OTOLARYNGOLOGY | Facility: CLINIC | Age: 63
End: 2023-07-06

## 2023-07-06 ENCOUNTER — OFFICE VISIT (OUTPATIENT)
Dept: OTOLARYNGOLOGY | Facility: CLINIC | Age: 63
End: 2023-07-06
Payer: COMMERCIAL

## 2023-07-06 VITALS — BODY MASS INDEX: 34.09 KG/M2 | WEIGHT: 211.19 LBS

## 2023-07-06 DIAGNOSIS — J32.8 OTHER CHRONIC SINUSITIS: ICD-10-CM

## 2023-07-06 DIAGNOSIS — J31.0 RHINITIS, UNSPECIFIED TYPE: ICD-10-CM

## 2023-07-06 DIAGNOSIS — R05.3 CHRONIC COUGH: Primary | ICD-10-CM

## 2023-07-06 DIAGNOSIS — K21.9 GASTROESOPHAGEAL REFLUX DISEASE WITHOUT ESOPHAGITIS: ICD-10-CM

## 2023-07-06 LAB — IGE SERPL-ACNC: <35 IU/ML (ref 0–100)

## 2023-07-06 PROCEDURE — 99999 PR PBB SHADOW E&M-EST. PATIENT-LVL IV: CPT | Mod: PBBFAC,,, | Performed by: PHYSICIAN ASSISTANT

## 2023-07-06 PROCEDURE — 1159F MED LIST DOCD IN RCRD: CPT | Mod: CPTII,S$GLB,, | Performed by: PHYSICIAN ASSISTANT

## 2023-07-06 PROCEDURE — 86003 ALLG SPEC IGE CRUDE XTRC EA: CPT | Performed by: PHYSICIAN ASSISTANT

## 2023-07-06 PROCEDURE — 3008F PR BODY MASS INDEX (BMI) DOCUMENTED: ICD-10-PCS | Mod: CPTII,S$GLB,, | Performed by: PHYSICIAN ASSISTANT

## 2023-07-06 PROCEDURE — 3008F BODY MASS INDEX DOCD: CPT | Mod: CPTII,S$GLB,, | Performed by: PHYSICIAN ASSISTANT

## 2023-07-06 PROCEDURE — 3044F PR MOST RECENT HEMOGLOBIN A1C LEVEL <7.0%: ICD-10-PCS | Mod: CPTII,S$GLB,, | Performed by: PHYSICIAN ASSISTANT

## 2023-07-06 PROCEDURE — 99204 PR OFFICE/OUTPT VISIT, NEW, LEVL IV, 45-59 MIN: ICD-10-PCS | Mod: 25,S$GLB,, | Performed by: PHYSICIAN ASSISTANT

## 2023-07-06 PROCEDURE — 36415 COLL VENOUS BLD VENIPUNCTURE: CPT | Performed by: PHYSICIAN ASSISTANT

## 2023-07-06 PROCEDURE — 31575 LARYNGOSCOPY: ICD-10-PCS | Mod: S$GLB,,, | Performed by: PHYSICIAN ASSISTANT

## 2023-07-06 PROCEDURE — 87075 CULTR BACTERIA EXCEPT BLOOD: CPT | Performed by: PHYSICIAN ASSISTANT

## 2023-07-06 PROCEDURE — 99999 PR PBB SHADOW E&M-EST. PATIENT-LVL IV: ICD-10-PCS | Mod: PBBFAC,,, | Performed by: PHYSICIAN ASSISTANT

## 2023-07-06 PROCEDURE — 3044F HG A1C LEVEL LT 7.0%: CPT | Mod: CPTII,S$GLB,, | Performed by: PHYSICIAN ASSISTANT

## 2023-07-06 PROCEDURE — 87076 CULTURE ANAEROBE IDENT EACH: CPT | Performed by: PHYSICIAN ASSISTANT

## 2023-07-06 PROCEDURE — 99204 OFFICE O/P NEW MOD 45 MIN: CPT | Mod: 25,S$GLB,, | Performed by: PHYSICIAN ASSISTANT

## 2023-07-06 PROCEDURE — 86003 ALLG SPEC IGE CRUDE XTRC EA: CPT | Mod: 59 | Performed by: PHYSICIAN ASSISTANT

## 2023-07-06 PROCEDURE — 82785 ASSAY OF IGE: CPT | Performed by: PHYSICIAN ASSISTANT

## 2023-07-06 PROCEDURE — 1159F PR MEDICATION LIST DOCUMENTED IN MEDICAL RECORD: ICD-10-PCS | Mod: CPTII,S$GLB,, | Performed by: PHYSICIAN ASSISTANT

## 2023-07-06 PROCEDURE — 31575 DIAGNOSTIC LARYNGOSCOPY: CPT | Mod: S$GLB,,, | Performed by: PHYSICIAN ASSISTANT

## 2023-07-06 PROCEDURE — 86317 IMMUNOASSAY INFECTIOUS AGENT: CPT | Performed by: PHYSICIAN ASSISTANT

## 2023-07-06 PROCEDURE — 87070 CULTURE OTHR SPECIMN AEROBIC: CPT | Performed by: PHYSICIAN ASSISTANT

## 2023-07-06 RX ORDER — PROMETHAZINE HYDROCHLORIDE AND DEXTROMETHORPHAN HYDROBROMIDE 6.25; 15 MG/5ML; MG/5ML
SYRUP ORAL
COMMUNITY
Start: 2023-06-27 | End: 2023-07-06 | Stop reason: SDUPTHER

## 2023-07-06 RX ORDER — AZELASTINE 1 MG/ML
1 SPRAY, METERED NASAL 2 TIMES DAILY
COMMUNITY
Start: 2023-06-27 | End: 2023-09-17

## 2023-07-06 RX ORDER — PROMETHAZINE HYDROCHLORIDE AND DEXTROMETHORPHAN HYDROBROMIDE 6.25; 15 MG/5ML; MG/5ML
5 SYRUP ORAL EVERY 4 HOURS PRN
Qty: 180 ML | Refills: 0 | Status: SHIPPED | OUTPATIENT
Start: 2023-07-06 | End: 2023-07-13

## 2023-07-06 RX ORDER — DEXBROMPHENIRAMINE MALEATE, DEXTROMETHORPHAN HBR, PHENYLEPHRINE HCL 2; 20; 10 G/1; G/1; G/1
1 TABLET ORAL EVERY 6 HOURS
COMMUNITY
Start: 2023-06-27 | End: 2023-09-17

## 2023-07-06 NOTE — PROCEDURES
"Laryngoscopy    Date/Time: 7/6/2023 11:30 AM  Performed by: Fabio Green PA-C  Authorized by: Fabio Green PA-C     Time out: Immediately prior to procedure a "time out" was called to verify the correct patient, procedure, equipment, support staff and site/side marked as required.    Consent Done?:  Yes (Verbal)  Anesthesia:     Local anesthetic:  Lidocaine 4% and Natan-Synephrine 1/2%    Patient tolerance:  Patient tolerated the procedure well with no immediate complications  Laryngoscopy:     Areas examined:  Nasal cavities, nasopharynx, oropharynx, hypopharynx, larynx and vocal cords    Laryngoscope size:  4 mm  Nose Intranasal:      Mucosa no polyps     Mucosa ulcers not present     No mucosa lesions present     No septum gross deformity     Enlarged turbinates  Nasopharynx:      No mucosa lesions     Adenoids present     Posterior choanae patent     Eustachian tube patent  Larynx/hypopharynx:      No epiglottis lesions     No epiglottis edema     No AE folds lesions     No vocal cord polyps     Equal and normal bilateral     No hypopharynx lesions     No piriform sinus pooling     No piriform sinus lesions     Post cricoid edema     No post cricoid erythema     Clear PND from L posterior IT  Thick, opaque PND from R PC  Yellow/green crusting in R anterior nasal cavity (swabbed)  Erythematous arytenoids  "

## 2023-07-06 NOTE — PROGRESS NOTES
Subjective:     HPI: Pietro Dietz is a 63 y.o. male with SNHL (wears hearing aids) who was referred to me by Dr. Nick Whittaker in consultation for chronic cough.    Patient reports chronic sinus issues for the last several years.  Patient reports episodes of sore throat with associated stuffy ears, clear rhinorrhea, clear postnasal drip, and cough.  The cough is usually dry hacking cough and worse at night; the patient denies globus sensation or frequent throat clearing.  Patient denies it itchy or sneezing but reports red eyes with episodes.  Patient uses CPAP at night as a mouth breather during the day so he will wake up with a dry nose.  Patient uses hearing aids bilaterally.  Patient has tried Claritin and other antihistamines in the past without improvement.  Patient has used Flonase in the past.  Patient reports current episode started 1/2 weeks ago and was evaluated at urgent care.  Patient was started on azelastine nasal spray with no significant changes in the clear rhinorrhea.    Current sinonasal medications as above.  The last course of antibiotics was a long time ago.    He does not recall previously having allergy testing.  He denies a history of asthma.  He relates a history of reflux symptoms which is currently managed with omeprazole 20 mg PO daily.    He relates a diagnosis of obstructive sleep apnea with regular CPAP use.   He does not recall a prior history of nasal trauma.  He has not had sinonasal surgery.    He has not had a tonsillectomy.  He is not a tobacco smoker.     Past Medical/Past Surgical History  Past Medical History:   Diagnosis Date    Fatty liver     GERD (gastroesophageal reflux disease)     Hyperlipidemia     Sleep apnea      He has a past surgical history that includes Appendectomy; Cosmetic surgery; Vitrectomy; Open reduction and internal fixation (ORIF) of fracture of calcaneus (Right, 11/05/2021); and Eye surgery.    Family History/Social History    family history  includes Heart disease in his father; Heart disease (age of onset: 66) in his brother; Hypertension in his mother; Stroke (age of onset: 60) in his father.  He reports that he has never smoked. He has never used smokeless tobacco. He reports current alcohol use of about 3.0 standard drinks per week. He reports that he does not use drugs.    Allergies/Immunizations  He is allergic to erythromycin.  Immunization History   Administered Date(s) Administered    COVID-19, MRNA, LN-S, PF (Pfizer) (Gray Cap) 04/14/2022    COVID-19, MRNA, LN-S, PF (Pfizer) (Purple Cap) 02/25/2021, 03/18/2021, 09/28/2021    Influenza - Quadrivalent - MDCK - PF 10/07/2020    Influenza - Quadrivalent - PF *Preferred* (6 months and older) 10/27/2022    Tdap 05/02/2019        Medications   ALAHIST CF Tab  atorvastatin  azelastine  omeprazole  paroxetine  promethazine-dextromethorphan Syrp  tadalafiL  VASCEPA Cap     Review of Systems     Constitutional: Negative for appetite change, chills, fatigue, fever and unexpected weight loss.      HENT: Positive for postnasal drip, sinus infection, sore throat and trouble swallowing.      Eyes:  Negative for change in eyesight, eye drainage, eye itching and photophobia.     Respiratory:  Positive for cough and sleep apnea.      Cardiovascular:  Negative for chest pain, foot swelling, irregular heartbeat and swollen veins.     Gastrointestinal:  Negative for abdominal pain, acid reflux, constipation, diarrhea, heartburn and vomiting.     Genitourinary: Positive for frequent urination.     Musc: Negative for aching joints, aching muscles, back pain and neck pain.     Skin: Negative for rash.     Allergy: Negative for food allergies and seasonal allergies.     Endocrine: Negative for cold intolerance and heat intolerance.      Neurological: Positive for headaches.     Hematologic: Negative for bruises/bleeds easily and swollen glands.      Psychiatric: Negative for decreased concentration, depression,  nervous/anxious and sleep disturbance.          Objective:     Wt 95.8 kg (211 lb 3.2 oz)   BMI 34.09 kg/m²        Constitutional:   He appears well-developed and well-nourished. Normal speech.      Head:  Normocephalic and atraumatic. Head is without TMJ tenderness. Facial strength is normal.      Ears:    Right Ear: No drainage or swelling. Tympanic membrane is not perforated and not erythematous. No middle ear effusion.   Left Ear: No drainage or swelling. Tympanic membrane is not perforated and not erythematous.  No middle ear effusion.     Nose:  Rhinorrhea (R>L) present. No mucosal edema, septal deviation or polyps.  No foreign bodies. Turbinate hypertrophy.  Turbinates normal and no turbinate masses.  Right sinus exhibits no maxillary sinus tenderness and no frontal sinus tenderness. Left sinus exhibits no maxillary sinus tenderness and no frontal sinus tenderness.   Green crust in R anterior nasal cavity    Mouth/Throat  Oropharynx clear and moist without lesions or asymmetry, normal uvula midline and lips, teeth, and gums normal. No trismus or mucous membrane lesions. No oropharyngeal exudate, posterior oropharyngeal edema or posterior oropharyngeal erythema. Tonsils not present.      Neck:  Neck normal without thyromegaly masses, asymmetry, normal tracheal structure, crepitus, and tenderness and phonation normal.     He has no cervical adenopathy.     Pulmonary/Chest:   Effort normal.     Psychiatric:   He has a normal mood and affect. His speech is normal and behavior is normal.     Procedure    Flexible laryngoscopy performed.  See procedure note.     L NV     L MT     L ET     R NV     R MT     R ET     Hypopharynx/larynx     VF mobility    Data Reviewed  I personally reviewed the chart, including any outside records, and pertinent data below:    WBC (K/uL)   Date Value   05/26/2023 5.24     Eosinophil % (%)   Date Value   05/26/2023 2.5     Eos # (K/uL)   Date Value   05/26/2023 0.1     Platelets  (K/uL)   Date Value   05/26/2023 209     Glucose (mg/dL)   Date Value   05/26/2023 101     No results found for: IGE    No sinus imaging available.    Assessment & Plan:     1. Chronic cough  -      Laryngoscopy with evidence of rhinosinusitis and LPR  - Advised patient to start nasal saline rinses  - Advised patient to continue Augmentin  -     promethazine-dextromethorphan (PROMETHAZINE-DM) 6.25-15 mg/5 mL Syrp; Take 5 mLs by mouth every 4 (four) hours as needed (cough).  Dispense: 180 mL; Refill: 0    2. Other chronic sinusitis  -    I swabbed his nasal exudate for culture and will use the results to direct antibiotic therapy as indicated.  -     S.pneumoniae IgG Serotypes; Future; Expected date: 08/17/2023  -     CT Sinuses without Contrast; Future; Expected date: 07/06/2023    3. Rhinitis, unspecified type  -     IgE; Future; Expected date: 07/06/2023  -     Dermatophagoides [place]; Future; Expected date: 07/06/2023  -     Dermatophagoides Pteronyssinus; Future; Expected date: 07/06/2023  -     [place]; Future; Expected date: 07/06/2023  -     [place]; Future; Expected date: 07/06/2023  -     [place]; Future; Expected date: 07/06/2023  -     English Plantain; Future; Expected date: 07/06/2023  -     Oak; Future; Expected date: 07/06/2023  -     Pecan; Future; Expected date: 07/06/2023  -     Restrepo Elder; Future; Expected date: 07/06/2023  -     Ragweed; Future; Expected date: 07/06/2023  -     Alternaria; Future; Expected date: 07/06/2023  -     Aspergillus; Future; Expected date: 07/06/2023  -     Cat; Future; Expected date: 07/06/2023  -     Cockroach; Future; Expected date: 07/06/2023  -     Dog; Future; Expected date: 07/06/2023    4. Gastroesophageal reflux disease without esophagitis   - continue PPI   - will consider referral to GI    He will Follow up in about 3 weeks (around 7/27/2023) for re-evaluate post treatment.  I had a discussion with the patient regarding his condition and the further workup and  management options.    All questions were answered, and the patient is in agreement with the above.     Disclaimer:  This note may have been prepared utilizing voice recognition software which may result in occasional typographical errors in the text such as sound alike words.   If further clarification is needed, please contact the ENT department of [facility].

## 2023-07-06 NOTE — PATIENT INSTRUCTIONS
"NeilMed Sinus rinse   Try purchasing this nasal rinse below.  Its over the counter and can use several times daily without any side effects, but please use at least 1-2 times daily.  Use it before applying your nasal spray medications.  This spray can help wash away mucus and crusting and allow for better absorption of the medications.  Please use the nasal saline spray about 15 minutes before applying your medicated nasal sprays. This bottle uses distilled water (NOT tap water).  The Instructions in the box are detailed so please read them before using.         LARYNGOPHARYNGEAL REFLUX  (SILENT OR ATYPICAL REFLUX)    LPR is a very challenging disease as it includes a vast range of symptoms and difficult to diagnose.      SYMPTOMS  If you have any of the following symptoms you MAY have laryngopharyngeal reflux (LPR):    1. Hoarseness  2. Sore throat  4. throat clearing, sometimes clearing thick mucous  5. chronic cough, especially cough that wakes you up from sleep  6.  "postnasal drip" without the need to blow your nose  7. Globus sensation, like the sensation of something being stuck in the throat  8.  Red, swollen or irritated larynx (voice box)  Many people with LPR do not have symptoms of heartburn. Compared to the esophagus, the voice box and the back of the throat are significantly more sensitive to the effects of acid and digestive enzymes on surrounding tissue. Acid passing quickly through the esophagus does not have a chance to irritate the area for too long.  However acid that pools in the throat or voice box can cause prolonged irritation resulting in the symptoms of LPR.    HOW  There are muscles (esophageal sphincters) at both ends of the tube that carries food to your stomach (the esophagus). These muscles relax to let food pass. Then they tighten to keep stomach acid down. When the lower esophageal sphincter (LES) doesn't tighten enough, acid can flow back (reflux) from your stomach into your " esophagus. This may cause heartburn. In some cases the upper esophageal sphincter (UES) also doesn't work well. Then acid can travel higher and enter your throat (pharynx). In many cases, this causes throat symptoms.    DIAGNOSIS  A common procedure performed by an ENT is called a direct Laryngoscope.  A thin tube is inserted through the nose in order to visualize the larynx (the voice box). This method can detect abnormalities in the voice box ranging from polyps to laryngeal cancer.  This can also reveal signs of LPR, but is not 100% reliable.      If symptoms persist despite medical treatment listed below, further testing is needed.  Three commonly used tests are: a swallowing study; a direct look at the stomach and esophagus through an endoscope, and; an esophageal pH test.  Further testing is usually coordinated with a gastroenterologist.     TREATMENT  Lifestyle changes  1. Do not smoke.  Smoking will worsen reflux.    2. Avoid eating at least 2-3 hours prior to bedtime.  Try to avoid very large meals at night.    4. Weight loss.  For patient's with recent weight gain, shedding a few pounds is all that is required to improve reflux.    5. Avoid reflux triggers. These can worsen acid in the stomach or even cause the esophagus muscles to relax: caffeine, soft drinks, acidic foods (tomato, lots of fruit) mints, alcoholic beverages, particularly at night, cheese, fried foods, spicy foods, eggs, and chocolate.    6. Sleep with the head of bed elevated at least 6 inches.    Medications  Take over-the-counter (OTC) medications, including antacids, such as Gaviscon Advance (others include Tums®, Maalox®, or Mylanta)  Prescription and OTC stomach acid reducers, such as ranitidine (Tagamet® or Zantac®) OR proton pump inhibitors (PPIs), such as omeprazole (Prilosec®), pantoprazole (Protonix®), and esomeprazole (Nexium®).  Most patients will begin to notice some relief in their symptoms about 2-4 weeks after starting an  acid reducing medication; however it is generally recommended the medication should be continued for at least 2 months. If the symptoms completely resolve, the medication can then be tapered.  Some people will remain symptom free while others may have relapses which required treatment again.      Other Treatments:  All natural remedies include Reflux Gourmet (and Reflux Raft) which create a temporary protective barrier in your stomach to prevent reflux into your esophagus. This can be an effective therapy without the side effects of PPIs and was also developed by laryngologists (LPR specialists).   Speech Therapy: education and techniques aimed at helping you control the cough

## 2023-07-07 ENCOUNTER — TELEPHONE (OUTPATIENT)
Dept: OTOLARYNGOLOGY | Facility: CLINIC | Age: 63
End: 2023-07-07
Payer: COMMERCIAL

## 2023-07-07 NOTE — TELEPHONE ENCOUNTER
LVM with pt that we received his message about faxing CT orders to [facility] and that they were faxed out 07/07 and would reach out to him to get scheduled.

## 2023-07-08 LAB — BACTERIA SPEC AEROBE CULT: NORMAL

## 2023-07-10 LAB — BACTERIA SPEC ANAEROBE CULT: ABNORMAL

## 2023-07-11 LAB
A ALTERNATA IGE QN: <0.1 KU/L
A FUMIGATUS IGE QN: <0.1 KU/L
BERMUDA GRASS IGE QN: 0.17 KU/L
CAT DANDER IGE QN: 0.11 KU/L
CEDAR IGE QN: <0.1 KU/L
D FARINAE IGE QN: 0.13 KU/L
D PTERONYSS IGE QN: 0.14 KU/L
DEPRECATED A ALTERNATA IGE RAST QL: NORMAL
DEPRECATED A FUMIGATUS IGE RAST QL: NORMAL
DEPRECATED BERMUDA GRASS IGE RAST QL: ABNORMAL
DEPRECATED CAT DANDER IGE RAST QL: ABNORMAL
DEPRECATED CEDAR IGE RAST QL: NORMAL
DEPRECATED D FARINAE IGE RAST QL: ABNORMAL
DEPRECATED D PTERONYSS IGE RAST QL: ABNORMAL
DEPRECATED DOG DANDER IGE RAST QL: NORMAL
DEPRECATED ELDER IGE RAST QL: ABNORMAL
DEPRECATED ENGL PLANTAIN IGE RAST QL: ABNORMAL
DEPRECATED PECAN/HICK TREE IGE RAST QL: ABNORMAL
DEPRECATED ROACH IGE RAST QL: ABNORMAL
DEPRECATED TIMOTHY IGE RAST QL: ABNORMAL
DEPRECATED WEST RAGWEED IGE RAST QL: ABNORMAL
DEPRECATED WHITE OAK IGE RAST QL: ABNORMAL
DOG DANDER IGE QN: <0.1 KU/L
ELDER IGE QN: 0.28 KU/L
ENGL PLANTAIN IGE QN: 0.16 KU/L
PECAN/HICK TREE IGE QN: 0.16 KU/L
ROACH IGE QN: 0.13 KU/L
TIMOTHY IGE QN: 0.19 KU/L
WEST RAGWEED IGE QN: 0.23 KU/L
WHITE OAK IGE QN: 0.2 KU/L

## 2023-07-12 LAB
DEPRECATED S PNEUM12 IGG SER-MCNC: 1 UG/ML
DEPRECATED S PNEUM23 IGG SER-MCNC: <0.3 UG/ML
DEPRECATED S PNEUM4 IGG SER-MCNC: <0.3 UG/ML
DEPRECATED S PNEUM8 IGG SER-MCNC: <0.3 UG/ML
DEPRECATED S PNEUM9 IGG SER-MCNC: <0.3 UG/ML
S PN DA SERO 19F IGG SER-MCNC: 2.8 UG/ML
S PNEUM DA 1 IGG SER-MCNC: 1 UG/ML
S PNEUM DA 14 IGG SER-MCNC: 1.4
S PNEUM DA 18C IGG SER-MCNC: <0.3
S PNEUM DA 3 IGG SER-MCNC: <0.3 UG/ML
S PNEUM DA 5 IGG SER-MCNC: 0.5 UG/ML
S PNEUM DA 6B IGG SER-MCNC: <0.3 UG/ML
S PNEUM DA 7F IGG SER-MCNC: 0.7 UG/ML
S PNEUM DA 9V IGG SER-MCNC: 0.3 UG/ML

## 2023-07-13 DIAGNOSIS — R76.0 ABNORMAL ANTIBODY TITER: Primary | ICD-10-CM

## 2023-07-24 RX ORDER — FINASTERIDE 5 MG/1
TABLET, FILM COATED ORAL
Qty: 90 TABLET | Refills: 3 | Status: SHIPPED | OUTPATIENT
Start: 2023-07-24

## 2023-07-24 NOTE — TELEPHONE ENCOUNTER
No care due was identified.  [facility] Embedded Care Due Messages. Reference number: [id number].   7/23/2023 8:04:32 PM CDT

## 2023-07-24 NOTE — TELEPHONE ENCOUNTER
Refill Routing Note   Medication(s) are not appropriate for processing by [facility] for the following reason(s):      No active prescription written by provider    [facility] action(s):  Defer Care Due:  None identified            Appointments  past 12m or future 3m with PCP    Date Provider   Last Visit   6/7/2023 Nick Whittaker MD   Next Visit   Visit date not found Nick Whittaker MD   ED visits in past 90 days: 0        Note composed:3:16 AM 07/24/2023

## 2023-07-28 ENCOUNTER — OFFICE VISIT (OUTPATIENT)
Dept: OTOLARYNGOLOGY | Facility: CLINIC | Age: 63
End: 2023-07-28
Payer: COMMERCIAL

## 2023-07-28 VITALS
HEART RATE: 53 BPM | WEIGHT: 213.88 LBS | SYSTOLIC BLOOD PRESSURE: 134 MMHG | BODY MASS INDEX: 34.52 KG/M2 | DIASTOLIC BLOOD PRESSURE: 87 MMHG

## 2023-07-28 DIAGNOSIS — J32.8 OTHER CHRONIC SINUSITIS: ICD-10-CM

## 2023-07-28 DIAGNOSIS — K21.9 LARYNGOPHARYNGEAL REFLUX (LPR): Primary | ICD-10-CM

## 2023-07-28 DIAGNOSIS — J31.0 NON-ALLERGIC RHINITIS: ICD-10-CM

## 2023-07-28 DIAGNOSIS — H69.91 ETD (EUSTACHIAN TUBE DYSFUNCTION), RIGHT: ICD-10-CM

## 2023-07-28 PROCEDURE — 3008F PR BODY MASS INDEX (BMI) DOCUMENTED: ICD-10-PCS | Mod: CPTII,S$GLB,, | Performed by: PHYSICIAN ASSISTANT

## 2023-07-28 PROCEDURE — 3079F PR MOST RECENT DIASTOLIC BLOOD PRESSURE 80-89 MM HG: ICD-10-PCS | Mod: CPTII,S$GLB,, | Performed by: PHYSICIAN ASSISTANT

## 2023-07-28 PROCEDURE — 99215 OFFICE O/P EST HI 40 MIN: CPT | Mod: S$GLB,,, | Performed by: PHYSICIAN ASSISTANT

## 2023-07-28 PROCEDURE — 3079F DIAST BP 80-89 MM HG: CPT | Mod: CPTII,S$GLB,, | Performed by: PHYSICIAN ASSISTANT

## 2023-07-28 PROCEDURE — 3075F PR MOST RECENT SYSTOLIC BLOOD PRESS GE 130-139MM HG: ICD-10-PCS | Mod: CPTII,S$GLB,, | Performed by: PHYSICIAN ASSISTANT

## 2023-07-28 PROCEDURE — 92567 TYMPANOMETRY: CPT | Mod: S$GLB,,, | Performed by: PHYSICIAN ASSISTANT

## 2023-07-28 PROCEDURE — 1159F MED LIST DOCD IN RCRD: CPT | Mod: CPTII,S$GLB,, | Performed by: PHYSICIAN ASSISTANT

## 2023-07-28 PROCEDURE — 3075F SYST BP GE 130 - 139MM HG: CPT | Mod: CPTII,S$GLB,, | Performed by: PHYSICIAN ASSISTANT

## 2023-07-28 PROCEDURE — 3044F HG A1C LEVEL LT 7.0%: CPT | Mod: CPTII,S$GLB,, | Performed by: PHYSICIAN ASSISTANT

## 2023-07-28 PROCEDURE — 92567 PR TYMPA2METRY: ICD-10-PCS | Mod: S$GLB,,, | Performed by: PHYSICIAN ASSISTANT

## 2023-07-28 PROCEDURE — 1159F PR MEDICATION LIST DOCUMENTED IN MEDICAL RECORD: ICD-10-PCS | Mod: CPTII,S$GLB,, | Performed by: PHYSICIAN ASSISTANT

## 2023-07-28 PROCEDURE — 99999 PR PBB SHADOW E&M-EST. PATIENT-LVL III: ICD-10-PCS | Mod: PBBFAC,,, | Performed by: PHYSICIAN ASSISTANT

## 2023-07-28 PROCEDURE — 3044F PR MOST RECENT HEMOGLOBIN A1C LEVEL <7.0%: ICD-10-PCS | Mod: CPTII,S$GLB,, | Performed by: PHYSICIAN ASSISTANT

## 2023-07-28 PROCEDURE — 99215 PR OFFICE/OUTPT VISIT, EST, LEVL V, 40-54 MIN: ICD-10-PCS | Mod: S$GLB,,, | Performed by: PHYSICIAN ASSISTANT

## 2023-07-28 PROCEDURE — 3008F BODY MASS INDEX DOCD: CPT | Mod: CPTII,S$GLB,, | Performed by: PHYSICIAN ASSISTANT

## 2023-07-28 PROCEDURE — 99999 PR PBB SHADOW E&M-EST. PATIENT-LVL III: CPT | Mod: PBBFAC,,, | Performed by: PHYSICIAN ASSISTANT

## 2023-07-28 NOTE — PROGRESS NOTES
Tympanogram    Right Ear Left Ear   Volume (mL) 2.29 1.70   Compliance (mL) 0.28 0.33   Pressure (daPa) 2 -1

## 2023-07-28 NOTE — PROGRESS NOTES
Subjective:      Pietro is a 63 y.o. male who comes for follow-up of  chronic cough .  His last visit with me was on 7/6/2023.      Patient reports persistent cough that started with a tickle in his throat.  Patient reports about 3 days of feeling completely normal after completing antibiotics recently but then restarted.  Patient reports overall improvement in his nasal symptoms.  Patient does report developing right ear pain for 2 days last week which resolved but then improved.  Patient reports right ear feels stuffy and now the left starting to feel the same.  Patient is able to auto insufflate in the clinic today which helps relieve the pressure in his right ear.  Allergy testing negative. Titers low, ordered pneumovax.    Per last office visit 7/6/2023 :  Patient reports chronic sinus issues for the last several years.  Patient reports episodes of sore throat with associated stuffy ears, clear rhinorrhea, clear postnasal drip, and cough.  The cough is usually dry hacking cough and worse at night; the patient denies globus sensation or frequent throat clearing.  Patient denies it itchy or sneezing but reports red eyes with episodes.  Patient uses CPAP at night as a mouth breather during the day so he will wake up with a dry nose.  Patient uses hearing aids bilaterally.  Patient has tried Claritin and other antihistamines in the past without improvement.  Patient has used Flonase in the past.  Patient reports current episode started 1/2 weeks ago and was evaluated at urgent care.  Patient was started on azelastine nasal spray with no significant changes in the clear rhinorrhea.     Current sinonasal medications as above.  The last course of antibiotics was a long time ago.    He does not recall previously having allergy testing.  He denies a history of asthma.  He relates a history of reflux symptoms which is currently managed with omeprazole 20 mg PO daily.    He relates a diagnosis of obstructive sleep  apnea with regular CPAP use.   He does not recall a prior history of nasal trauma.  He has not had sinonasal surgery.    He has not had a tonsillectomy.  He is not a tobacco smoker.   1. Chronic cough  -      Laryngoscopy with evidence of rhinosinusitis and LPR  -     Advised patient to start nasal saline rinses  -     Advised patient to continue Augmentin  -     promethazine-dextromethorphan (PROMETHAZINE-DM) 6.25-15 mg/5 mL Syrp; Take 5 mLs by mouth every 4 (four) hours as needed (cough).  Dispense: 180 mL; Refill: 0     2. Other chronic sinusitis  -    I swabbed his nasal exudate for culture and will use the results to direct antibiotic therapy as indicated.  -     S.pneumoniae IgG Serotypes; Future; Expected date: 08/17/2023  -     CT Sinuses without Contrast; Future; Expected date: 07/06/2023     3. Rhinitis, unspecified type  -    checking inhalant immunocaps     4. Gastroesophageal reflux disease without esophagitis              - continue PPI              - will consider referral to GI    The patient's medications, allergies, past medical, surgical, social and family histories were reviewed and updated as appropriate.    A detailed review of systems was obtained with pertinent positives as per the above HPI, and otherwise negative.        Objective:     /87 (BP Location: Right arm, Patient Position: Sitting)   Pulse (!) 53   Wt 97 kg (213 lb 13.5 oz)   BMI 34.52 kg/m²        Constitutional:   He appears well-developed and well-nourished. Normal speech.      Head:  Normocephalic and atraumatic.     Ears:    Right Ear: No drainage or swelling. Tympanic membrane is not retracted and not bulging. Tympanic membrane mobility is abnormal.   Left Ear: No drainage or swelling. Tympanic membrane is not retracted and not bulging. Tympanic membrane mobility is abnormal.     Nose:  Rhinorrhea present. No mucosal edema, sinus tenderness or septal deviation. No epistaxis. Turbinate hypertrophy.  Turbinates normal  and no turbinate masses.  Right sinus exhibits no maxillary sinus tenderness and no frontal sinus tenderness. Left sinus exhibits no maxillary sinus tenderness and no frontal sinus tenderness.   Thin stringy mucous in L nasal cavity    Neck:  Neck normal without thyromegaly masses, asymmetry, normal tracheal structure, crepitus, and tenderness.     Pulmonary/Chest:   Effort normal.     Psychiatric:   He has a normal mood and affect. His speech is normal and behavior is normal.      Procedure    None    Data Reviewed    WBC (K/uL)   Date Value   05/26/2023 5.24     Eosinophil % (%)   Date Value   05/26/2023 2.5     Eos # (K/uL)   Date Value   05/26/2023 0.1     Platelets (K/uL)   Date Value   05/26/2023 209     Glucose (mg/dL)   Date Value   05/26/2023 101     IgE (IU/mL)   Date Value   07/06/2023 <35          I personally reviewed the CT sinus scan completed 7/12/23.  Significant findings include: small R makeda bullosa, suspect R max retention cyst, Mild inflammation in inferior max sinus R>L, R frontal partal opacifications and  R anterior ethmoid opacifications.     Assessment:     1. Laryngopharyngeal reflux (LPR)    2. ETD (Eustachian tube dysfunction), right    3. Other chronic sinusitis    4. Non-allergic rhinitis         Plan:     Increased omeprazole to 40 mg PO daily and follow up with GI  Continue flonase for non-allergic rhinitis and episodes of ETD  Tympanogram with Type As AD and Type A AS  Follow up with otologist    He will Follow up if symptoms worsen or fail to improve.  I had a discussion with the patient regarding his condition and the further workup and management options.    All questions were answered, and the patient is in agreement with the above.     Disclaimer:  This note may have been prepared utilizing voice recognition software which may result in occasional typographical errors in the text such as sound alike words.   If further clarification is needed, please contact the ENT  department of [facility].

## 2023-07-28 NOTE — PATIENT INSTRUCTIONS
"Increase omeprazole to 40 mg (two tablets) for the cough  Continue flonase    LARYNGOPHARYNGEAL REFLUX  (SILENT OR ATYPICAL REFLUX)    LPR is a very challenging disease as it includes a vast range of symptoms and difficult to diagnose.      SYMPTOMS  If you have any of the following symptoms you MAY have laryngopharyngeal reflux (LPR):    1. Hoarseness  2. Sore throat  4. throat clearing, sometimes clearing thick mucous  5. chronic cough, especially cough that wakes you up from sleep  6.  "postnasal drip" without the need to blow your nose  7. Globus sensation, like the sensation of something being stuck in the throat  8.  Red, swollen or irritated larynx (voice box)  Many people with LPR do not have symptoms of heartburn. Compared to the esophagus, the voice box and the back of the throat are significantly more sensitive to the effects of acid and digestive enzymes on surrounding tissue. Acid passing quickly through the esophagus does not have a chance to irritate the area for too long.  However acid that pools in the throat or voice box can cause prolonged irritation resulting in the symptoms of LPR.    HOW  There are muscles (esophageal sphincters) at both ends of the tube that carries food to your stomach (the esophagus). These muscles relax to let food pass. Then they tighten to keep stomach acid down. When the lower esophageal sphincter (LES) doesn't tighten enough, acid can flow back (reflux) from your stomach into your esophagus. This may cause heartburn. In some cases the upper esophageal sphincter (UES) also doesn't work well. Then acid can travel higher and enter your throat (pharynx). In many cases, this causes throat symptoms.    DIAGNOSIS  A common procedure performed by an ENT is called a direct Laryngoscope.  A thin tube is inserted through the nose in order to visualize the larynx (the voice box). This method can detect abnormalities in the voice box ranging from polyps to laryngeal cancer.  This " can also reveal signs of LPR, but is not 100% reliable.      If symptoms persist despite medical treatment listed below, further testing is needed.  Three commonly used tests are: a swallowing study; a direct look at the stomach and esophagus through an endoscope, and; an esophageal pH test.  Further testing is usually coordinated with a gastroenterologist.     TREATMENT  Lifestyle changes  1. Do not smoke.  Smoking will worsen reflux.    2. Avoid eating at least 2-3 hours prior to bedtime.  Try to avoid very large meals at night.    4. Weight loss.  For patient's with recent weight gain, shedding a few pounds is all that is required to improve reflux.    5. Avoid reflux triggers. These can worsen acid in the stomach or even cause the esophagus muscles to relax: caffeine, soft drinks, acidic foods (tomato, lots of fruit) mints, alcoholic beverages, particularly at night, cheese, fried foods, spicy foods, eggs, and chocolate.    6. Sleep with the head of bed elevated at least 6 inches.    Medications  Take over-the-counter (OTC) medications, including antacids, such as Gaviscon Advance (others include Tums®, Maalox®, or Mylanta)  Prescription and OTC stomach acid reducers, such as ranitidine (Tagamet® or Zantac®) OR proton pump inhibitors (PPIs), such as omeprazole (Prilosec®), pantoprazole (Protonix®), and esomeprazole (Nexium®).  Most patients will begin to notice some relief in their symptoms about 2-4 weeks after starting an acid reducing medication; however it is generally recommended the medication should be continued for at least 2 months. If the symptoms completely resolve, the medication can then be tapered.  Some people will remain symptom free while others may have relapses which required treatment again.      Other Treatments:  All natural remedies include Reflux Gourmet (and Reflux Raft) which create a temporary protective barrier in your stomach to prevent reflux into your esophagus. This can be an  effective therapy without the side effects of PPIs and was also developed by laryngologists (LPR specialists).   Speech Therapy: education and techniques aimed at helping you control the cough

## 2023-08-02 DIAGNOSIS — K21.9 GASTROESOPHAGEAL REFLUX DISEASE WITHOUT ESOPHAGITIS: ICD-10-CM

## 2023-08-02 DIAGNOSIS — F41.9 ANXIETY: ICD-10-CM

## 2023-08-02 RX ORDER — OMEPRAZOLE 20 MG/1
20 CAPSULE, DELAYED RELEASE ORAL NIGHTLY
Qty: 90 CAPSULE | Refills: 3 | Status: SHIPPED | OUTPATIENT
Start: 2023-08-02 | End: 2024-02-01 | Stop reason: SDUPTHER

## 2023-08-02 RX ORDER — PAROXETINE 10 MG/1
10 TABLET, FILM COATED ORAL NIGHTLY
Qty: 90 TABLET | Refills: 3 | Status: SHIPPED | OUTPATIENT
Start: 2023-08-02

## 2023-08-02 NOTE — TELEPHONE ENCOUNTER
Refill Decision Note   Pietro Silverjanet  is requesting a refill authorization.  Brief Assessment and Rationale for Refill:  Approve     Medication Therapy Plan:         Pharmacist review requested: Yes   Comments:     Note composed:12:08 PM 08/02/2023

## 2023-08-02 NOTE — TELEPHONE ENCOUNTER
Refill Routing Note   Medication(s) are not appropriate for processing by [facility] for the following reason(s):      Drug-disease interaction :     [facility] action(s):  Defer  Approve Care Due:  None identified            Pharmacist review requested: Yes     Appointments  past 12m or future 3m with PCP    Date Provider   Last Visit   6/7/2023 Nick Whittaker MD   Next Visit   Visit date not found Nick Whittaker MD   ED visits in past 90 days: 0        Note composed:11:02 AM 08/02/2023

## 2023-08-02 NOTE — TELEPHONE ENCOUNTER
No care due was identified.  [facility] Embedded Care Due Messages. Reference number: [id number].   8/02/2023 12:36:02 AM CDT

## 2023-09-17 ENCOUNTER — HOSPITAL ENCOUNTER (OUTPATIENT)
Facility: HOSPITAL | Age: 63
Discharge: HOME | End: 2023-09-18
Attending: STUDENT IN AN ORGANIZED HEALTH CARE EDUCATION/TRAINING PROGRAM | Admitting: INTERNAL MEDICINE
Payer: COMMERCIAL

## 2023-09-17 DIAGNOSIS — K92.2 LOWER GI BLEED: Primary | ICD-10-CM

## 2023-09-17 DIAGNOSIS — K57.90 DIVERTICULOSIS: ICD-10-CM

## 2023-09-17 DIAGNOSIS — R07.9 CHEST PAIN: ICD-10-CM

## 2023-09-17 DIAGNOSIS — K62.5 BRBPR (BRIGHT RED BLOOD PER RECTUM): ICD-10-CM

## 2023-09-17 PROBLEM — N40.0 BPH (BENIGN PROSTATIC HYPERPLASIA): Status: ACTIVE | Noted: 2023-09-17

## 2023-09-17 LAB
ABO + RH BLD: NORMAL
ALBUMIN SERPL BCP-MCNC: 3.5 G/DL (ref 3.5–5.2)
ALP SERPL-CCNC: 86 U/L (ref 55–135)
ALT SERPL W/O P-5'-P-CCNC: 48 U/L (ref 10–44)
ANION GAP SERPL CALC-SCNC: 7 MMOL/L (ref 8–16)
APTT PPP: 22.8 SEC (ref 21–32)
AST SERPL-CCNC: 38 U/L (ref 10–40)
BASOPHILS # BLD AUTO: 0.02 K/UL (ref 0–0.2)
BASOPHILS # BLD AUTO: 0.03 K/UL (ref 0–0.2)
BASOPHILS NFR BLD: 0.3 % (ref 0–1.9)
BASOPHILS NFR BLD: 0.4 % (ref 0–1.9)
BILIRUB SERPL-MCNC: 0.6 MG/DL (ref 0.1–1)
BLD GP AB SCN CELLS X3 SERPL QL: NORMAL
BUN SERPL-MCNC: 30 MG/DL (ref 8–23)
CALCIUM SERPL-MCNC: 8.6 MG/DL (ref 8.7–10.5)
CHLORIDE SERPL-SCNC: 110 MMOL/L (ref 95–110)
CO2 SERPL-SCNC: 21 MMOL/L (ref 23–29)
CREAT SERPL-MCNC: 1.1 MG/DL (ref 0.5–1.4)
DIFFERENTIAL METHOD: ABNORMAL
DIFFERENTIAL METHOD: ABNORMAL
EOSINOPHIL # BLD AUTO: 0 K/UL (ref 0–0.5)
EOSINOPHIL # BLD AUTO: 0.2 K/UL (ref 0–0.5)
EOSINOPHIL NFR BLD: 0.6 % (ref 0–8)
EOSINOPHIL NFR BLD: 2 % (ref 0–8)
ERYTHROCYTE [DISTWIDTH] IN BLOOD BY AUTOMATED COUNT: 13.2 % (ref 11.5–14.5)
ERYTHROCYTE [DISTWIDTH] IN BLOOD BY AUTOMATED COUNT: 13.2 % (ref 11.5–14.5)
EST. GFR  (NO RACE VARIABLE): >60 ML/MIN/1.73 M^2
GLUCOSE SERPL-MCNC: 109 MG/DL (ref 70–110)
GLUCOSE SERPL-MCNC: 112 MG/DL (ref 70–110)
HCT VFR BLD AUTO: 40 % (ref 40–54)
HCT VFR BLD AUTO: 40.6 % (ref 40–54)
HGB BLD-MCNC: 13.5 G/DL (ref 14–18)
HGB BLD-MCNC: 14 G/DL (ref 14–18)
IMM GRANULOCYTES # BLD AUTO: 0.02 K/UL (ref 0–0.04)
IMM GRANULOCYTES # BLD AUTO: 0.04 K/UL (ref 0–0.04)
IMM GRANULOCYTES NFR BLD AUTO: 0.3 % (ref 0–0.5)
IMM GRANULOCYTES NFR BLD AUTO: 0.5 % (ref 0–0.5)
INR PPP: 1 (ref 0.8–1.2)
LYMPHOCYTES # BLD AUTO: 1.3 K/UL (ref 1–4.8)
LYMPHOCYTES # BLD AUTO: 1.3 K/UL (ref 1–4.8)
LYMPHOCYTES NFR BLD: 15.5 % (ref 18–48)
LYMPHOCYTES NFR BLD: 20.4 % (ref 18–48)
MCH RBC QN AUTO: 29.5 PG (ref 27–31)
MCH RBC QN AUTO: 29.7 PG (ref 27–31)
MCHC RBC AUTO-ENTMCNC: 33.3 G/DL (ref 32–36)
MCHC RBC AUTO-ENTMCNC: 35 G/DL (ref 32–36)
MCV RBC AUTO: 84 FL (ref 82–98)
MCV RBC AUTO: 89 FL (ref 82–98)
MONOCYTES # BLD AUTO: 0.6 K/UL (ref 0.3–1)
MONOCYTES # BLD AUTO: 0.6 K/UL (ref 0.3–1)
MONOCYTES NFR BLD: 6.8 % (ref 4–15)
MONOCYTES NFR BLD: 9 % (ref 4–15)
NEUTROPHILS # BLD AUTO: 4.5 K/UL (ref 1.8–7.7)
NEUTROPHILS # BLD AUTO: 6.1 K/UL (ref 1.8–7.7)
NEUTROPHILS NFR BLD: 69.4 % (ref 38–73)
NEUTROPHILS NFR BLD: 74.8 % (ref 38–73)
NRBC BLD-RTO: 0 /100 WBC
NRBC BLD-RTO: 0 /100 WBC
PLATELET # BLD AUTO: 198 K/UL (ref 150–450)
PLATELET # BLD AUTO: 229 K/UL (ref 150–450)
PMV BLD AUTO: 11.1 FL (ref 9.2–12.9)
PMV BLD AUTO: 11.7 FL (ref 9.2–12.9)
POCT GLUCOSE: 112 MG/DL (ref 70–110)
POCT GLUCOSE: 117 MG/DL (ref 70–110)
POTASSIUM SERPL-SCNC: 4.4 MMOL/L (ref 3.5–5.1)
PROT SERPL-MCNC: 6.6 G/DL (ref 6–8.4)
PROTHROMBIN TIME: 10.5 SEC (ref 9–12.5)
RBC # BLD AUTO: 4.54 M/UL (ref 4.6–6.2)
RBC # BLD AUTO: 4.75 M/UL (ref 4.6–6.2)
SODIUM SERPL-SCNC: 138 MMOL/L (ref 136–145)
SPECIMEN OUTDATE: NORMAL
WBC # BLD AUTO: 6.53 K/UL (ref 3.9–12.7)
WBC # BLD AUTO: 8.12 K/UL (ref 3.9–12.7)

## 2023-09-17 PROCEDURE — 86900 BLOOD TYPING SEROLOGIC ABO: CPT

## 2023-09-17 PROCEDURE — 63600175 PHARM REV CODE 636 W HCPCS

## 2023-09-17 PROCEDURE — 99223 PR INITIAL HOSPITAL CARE,LEVL III: ICD-10-PCS | Mod: ,,,

## 2023-09-17 PROCEDURE — 96361 HYDRATE IV INFUSION ADD-ON: CPT

## 2023-09-17 PROCEDURE — 96376 TX/PRO/DX INJ SAME DRUG ADON: CPT

## 2023-09-17 PROCEDURE — 93010 EKG 12-LEAD: ICD-10-PCS | Mod: ,,, | Performed by: INTERNAL MEDICINE

## 2023-09-17 PROCEDURE — 25000003 PHARM REV CODE 250

## 2023-09-17 PROCEDURE — 93005 ELECTROCARDIOGRAM TRACING: CPT

## 2023-09-17 PROCEDURE — 80053 COMPREHEN METABOLIC PANEL: CPT

## 2023-09-17 PROCEDURE — 85610 PROTHROMBIN TIME: CPT

## 2023-09-17 PROCEDURE — 99285 EMERGENCY DEPT VISIT HI MDM: CPT | Mod: 25

## 2023-09-17 PROCEDURE — 93010 ELECTROCARDIOGRAM REPORT: CPT | Mod: ,,, | Performed by: INTERNAL MEDICINE

## 2023-09-17 PROCEDURE — 85025 COMPLETE CBC W/AUTO DIFF WBC: CPT | Mod: 91

## 2023-09-17 PROCEDURE — 96374 THER/PROPH/DIAG INJ IV PUSH: CPT

## 2023-09-17 PROCEDURE — 85025 COMPLETE CBC W/AUTO DIFF WBC: CPT

## 2023-09-17 PROCEDURE — G0378 HOSPITAL OBSERVATION PER HR: HCPCS

## 2023-09-17 PROCEDURE — 85730 THROMBOPLASTIN TIME PARTIAL: CPT

## 2023-09-17 PROCEDURE — 82962 GLUCOSE BLOOD TEST: CPT

## 2023-09-17 PROCEDURE — 99223 1ST HOSP IP/OBS HIGH 75: CPT | Mod: ,,,

## 2023-09-17 PROCEDURE — C9113 INJ PANTOPRAZOLE SODIUM, VIA: HCPCS

## 2023-09-17 RX ORDER — MINERAL OIL
180 ENEMA (ML) RECTAL DAILY PRN
COMMUNITY

## 2023-09-17 RX ORDER — MAG HYDROX/ALUMINUM HYD/SIMETH 200-200-20
30 SUSPENSION, ORAL (FINAL DOSE FORM) ORAL 4 TIMES DAILY PRN
Status: DISCONTINUED | OUTPATIENT
Start: 2023-09-17 | End: 2023-09-18 | Stop reason: HOSPADM

## 2023-09-17 RX ORDER — IBUPROFEN 200 MG
24 TABLET ORAL
Status: DISCONTINUED | OUTPATIENT
Start: 2023-09-17 | End: 2023-09-18 | Stop reason: HOSPADM

## 2023-09-17 RX ORDER — ACETAMINOPHEN 325 MG/1
650 TABLET ORAL EVERY 6 HOURS PRN
Status: DISCONTINUED | OUTPATIENT
Start: 2023-09-17 | End: 2023-09-18 | Stop reason: HOSPADM

## 2023-09-17 RX ORDER — ONDANSETRON 8 MG/1
8 TABLET, ORALLY DISINTEGRATING ORAL EVERY 8 HOURS PRN
Status: DISCONTINUED | OUTPATIENT
Start: 2023-09-17 | End: 2023-09-18 | Stop reason: HOSPADM

## 2023-09-17 RX ORDER — ONDANSETRON 2 MG/ML
4 INJECTION INTRAMUSCULAR; INTRAVENOUS
Status: DISCONTINUED | OUTPATIENT
Start: 2023-09-17 | End: 2023-09-17

## 2023-09-17 RX ORDER — FINASTERIDE 5 MG/1
5 TABLET, FILM COATED ORAL NIGHTLY
Status: DISCONTINUED | OUTPATIENT
Start: 2023-09-17 | End: 2023-09-18 | Stop reason: HOSPADM

## 2023-09-17 RX ORDER — PAROXETINE 10 MG/1
10 TABLET, FILM COATED ORAL NIGHTLY
Status: DISCONTINUED | OUTPATIENT
Start: 2023-09-17 | End: 2023-09-18 | Stop reason: HOSPADM

## 2023-09-17 RX ORDER — GLUCAGON 1 MG
1 KIT INJECTION
Status: DISCONTINUED | OUTPATIENT
Start: 2023-09-17 | End: 2023-09-18 | Stop reason: HOSPADM

## 2023-09-17 RX ORDER — ASPIRIN 81 MG/1
81 TABLET ORAL DAILY
COMMUNITY

## 2023-09-17 RX ORDER — POLYETHYLENE GLYCOL 3350 17 G/17G
17 POWDER, FOR SOLUTION ORAL 2 TIMES DAILY PRN
Status: DISCONTINUED | OUTPATIENT
Start: 2023-09-17 | End: 2023-09-18 | Stop reason: HOSPADM

## 2023-09-17 RX ORDER — TALC
9 POWDER (GRAM) TOPICAL NIGHTLY PRN
Status: DISCONTINUED | OUTPATIENT
Start: 2023-09-17 | End: 2023-09-18 | Stop reason: HOSPADM

## 2023-09-17 RX ORDER — PROCHLORPERAZINE EDISYLATE 5 MG/ML
5 INJECTION INTRAMUSCULAR; INTRAVENOUS EVERY 6 HOURS PRN
Status: DISCONTINUED | OUTPATIENT
Start: 2023-09-17 | End: 2023-09-18 | Stop reason: HOSPADM

## 2023-09-17 RX ORDER — IBUPROFEN 200 MG
16 TABLET ORAL
Status: DISCONTINUED | OUTPATIENT
Start: 2023-09-17 | End: 2023-09-18 | Stop reason: HOSPADM

## 2023-09-17 RX ORDER — PANTOPRAZOLE SODIUM 40 MG/10ML
40 INJECTION, POWDER, LYOPHILIZED, FOR SOLUTION INTRAVENOUS 2 TIMES DAILY
Status: DISCONTINUED | OUTPATIENT
Start: 2023-09-17 | End: 2023-09-18 | Stop reason: HOSPADM

## 2023-09-17 RX ORDER — NALOXONE HCL 0.4 MG/ML
0.02 VIAL (ML) INJECTION
Status: DISCONTINUED | OUTPATIENT
Start: 2023-09-17 | End: 2023-09-18 | Stop reason: HOSPADM

## 2023-09-17 RX ORDER — ATORVASTATIN CALCIUM 40 MG/1
40 TABLET, FILM COATED ORAL NIGHTLY
Status: DISCONTINUED | OUTPATIENT
Start: 2023-09-17 | End: 2023-09-18 | Stop reason: HOSPADM

## 2023-09-17 RX ADMIN — SODIUM CHLORIDE, POTASSIUM CHLORIDE, SODIUM LACTATE AND CALCIUM CHLORIDE 500 ML: 600; 310; 30; 20 INJECTION, SOLUTION INTRAVENOUS at 10:09

## 2023-09-17 RX ADMIN — ATORVASTATIN CALCIUM 40 MG: 40 TABLET, FILM COATED ORAL at 09:09

## 2023-09-17 RX ADMIN — PANTOPRAZOLE SODIUM 40 MG: 40 INJECTION, POWDER, FOR SOLUTION INTRAVENOUS at 09:09

## 2023-09-17 RX ADMIN — FINASTERIDE 5 MG: 5 TABLET, FILM COATED ORAL at 09:09

## 2023-09-17 RX ADMIN — PANTOPRAZOLE SODIUM 40 MG: 40 INJECTION, POWDER, FOR SOLUTION INTRAVENOUS at 10:09

## 2023-09-17 RX ADMIN — PAROXETINE HYDROCHLORIDE 10 MG: 10 TABLET, FILM COATED ORAL at 09:09

## 2023-09-17 NOTE — H&P
"[facility] - Emergency Dept  [facility] Medicine  History & Physical    Patient Name: Pietro Dietz  MRN: [id number]  Patient Class: OP- Observation  Admission Date: 9/17/2023  Attending Physician: Maikel Jiang MD   Primary Care Provider: Nick Whittaker MD         Patient information was obtained from patient and ER records.     Subjective:     Principal Problem:BRBPR (bright red blood per rectum)    Chief Complaint:   Chief Complaint   Patient presents with    Rectal Bleeding     Bib ems for "rectal bleeding" per pt he passed " bright red blood in the toilet.  Unwitnessed by ems.         HPI: Pietro Dietz is a 63 y.o. male with PMHx of GERD, HLD, and anxiety, presenting with complaints of dark red blood per rectum.  Patient reports waking up and having multiple episodes of diarrhea with dark blood throughout.  Patient states during these episodes he was diaphoretic and nauseous. He says he had to lay down on the floor to prevent himself from passing out. He denies any vomiting or abdominal pain. Denies excess NSAID use, endorses compliance with omeprazole. Patient denies any previous history of GI bleed/rectal bleed.  He was in his normal state of health prior to this episode. Denies any chest pain, shortness of breath, cough, fevers or chills, weight changes, appetite changes, dysuria.    In the ED: AFVSS. Labs grossly unremarkable. Cr 1.1, baseline. T&S completed.      Past Medical History:   Diagnosis Date    Fatty liver     GERD (gastroesophageal reflux disease)     Hyperlipidemia     Sleep apnea        Past Surgical History:   Procedure Laterality Date    APPENDECTOMY      5 years old    COSMETIC SURGERY      EYE SURGERY      Lasik    OPEN REDUCTION AND INTERNAL FIXATION (ORIF) OF FRACTURE OF CALCANEUS Right 11/05/2021    Procedure: ORIF, FRACTURE, CALCANEUS;  Surgeon: Nichole Ortiz MD;  Location: [facility] OR [address] [place] [address];  Service: Orthopedics;  Laterality: Right;    VITRECTOMY         Review of patient's allergies " "indicates:   Allergen Reactions    Erythromycin Other (See Comments)     Causes "stomach burning"       No current facility-administered medications on file prior to encounter.     Current Outpatient Medications on File Prior to Encounter   Medication Sig    atorvastatin (LIPITOR) 40 MG tablet TAKE 1 TABLET EVERY EVENING    finasteride (PROSCAR) 5 mg tablet TAKE 1 TABLET EVERY EVENING    omeprazole (PRILOSEC) 20 MG capsule Take 1 capsule (20 mg total) by mouth every evening.    paroxetine (PAXIL) 10 MG tablet Take 1 tablet (10 mg total) by mouth every evening.    VASCEPA 1 gram Cap Take 1 capsule (1,000 mg total) by mouth every evening.    [DISCONTINUED] ALAHIST CF 2-10-20 mg Tab Take 1 tablet by mouth every 6 (six) hours.    [DISCONTINUED] azelastine (ASTELIN) 137 mcg (0.1 %) nasal spray 1 spray 2 (two) times daily.     Family History       Problem Relation (Age of Onset)    Heart disease Father, Brother (66)    Hypertension Mother    Stroke Father (60)          Tobacco Use    Smoking status: Never    Smokeless tobacco: Never   Substance and Sexual Activity    Alcohol use: Yes     Alcohol/week: 3.0 standard drinks of alcohol     Types: 3 Shots of liquor per week    Drug use: No    Sexual activity: Not Currently     Partners: Female     Review of Systems   Constitutional:  Positive for fever. Negative for activity change and chills.   HENT:  Negative for trouble swallowing.    Eyes:  Negative for photophobia and visual disturbance.   Respiratory:  Negative for chest tightness, shortness of breath and wheezing.    Cardiovascular:  Negative for chest pain, palpitations and leg swelling.   Gastrointestinal:  Positive for blood in stool and nausea. Negative for abdominal pain, constipation, diarrhea and vomiting.   Genitourinary:  Negative for dysuria, frequency, hematuria and urgency.   Musculoskeletal:  Negative for arthralgias, back pain and gait problem.   Skin:  Negative for color change and rash.   Neurological:  " Positive for weakness. Negative for dizziness, syncope, light-headedness, numbness and headaches.   Psychiatric/Behavioral:  Negative for agitation and confusion. The patient is not nervous/anxious.      Objective:     Vital Signs (Most Recent):  Temp: 97.8 °F (36.6 °C) (09/17/23 0700)  Pulse: 69 (09/17/23 0922)  Resp: 18 (09/17/23 0922)  BP: 122/65 (09/17/23 0922)  SpO2: 95 % (09/17/23 0922) Vital Signs (24h Range):  Temp:  [97.8 °F (36.6 °C)] 97.8 °F (36.6 °C)  Pulse:  [61-69] 69  Resp:  [14-18] 18  SpO2:  [95 %-98 %] 95 %  BP: (114-128)/(64-75) 122/65     Weight: 96.6 kg (213 lb)  Body mass index is 34.38 kg/m².     Physical Exam  Vitals and nursing note reviewed.   Constitutional:       General: He is not in acute distress.     Appearance: He is well-developed.   HENT:      Head: Normocephalic and atraumatic.      Mouth/Throat:      Pharynx: No oropharyngeal exudate.   Eyes:      Conjunctiva/sclera: Conjunctivae normal.      Pupils: Pupils are equal, round, and reactive to light.   Cardiovascular:      Rate and Rhythm: Normal rate and regular rhythm.      Heart sounds: Normal heart sounds.   Pulmonary:      Effort: Pulmonary effort is normal. No respiratory distress.      Breath sounds: Normal breath sounds. No wheezing.   Abdominal:      General: Bowel sounds are normal. There is no distension.      Palpations: Abdomen is soft.      Tenderness: There is no abdominal tenderness.   Musculoskeletal:         General: No tenderness. Normal range of motion.      Cervical back: Normal range of motion and neck supple.   Lymphadenopathy:      Cervical: No cervical adenopathy.   Skin:     General: Skin is warm and dry.      Capillary Refill: Capillary refill takes less than 2 seconds.      Findings: No rash.   Neurological:      Mental Status: He is alert and oriented to person, place, and time.      Cranial Nerves: No cranial nerve deficit.      Sensory: No sensory deficit.      Coordination: Coordination normal.    Psychiatric:         Behavior: Behavior normal.         Thought Content: Thought content normal.         Judgment: Judgment normal.              CRANIAL NERVES     CN III, IV, VI   Pupils are equal, round, and reactive to light.       Significant Labs: All pertinent labs within the past 24 hours have been reviewed.    Significant Imaging: I have reviewed all pertinent imaging results/findings within the past 24 hours.    Assessment/Plan:     * BRBPR (bright red blood per rectum)  GIB pathway   HDS  Hgb stable 14.0  Start Protonix 40mg IV BID  GI consulted, appreciate assistance  NPO at midnight for possible EGD. CLD for now.  Type and screen completed   Transfuse for Hemoglobin <7 -> will need blood consent        BPH (benign prostatic hyperplasia)  - continue finasteride      Anxiety  - continue paxil       GERD (gastroesophageal reflux disease)  - oral protonix at home, will start IV here in setting of GIB       Hyperlipidemia  - Continue statin   - Vascepa not on formula, can hold for now         VTE Risk Mitigation (From admission, onward)           Ordered     Reason for No Pharmacological VTE Prophylaxis  Once        Question:  Reasons:  Answer:  Active Bleeding    09/17/23 1020     IP VTE HIGH RISK PATIENT  Once         09/17/23 1020     Place sequential compression device  Until discontinued         09/17/23 1020     Place sequential compression device  Until discontinued         09/17/23 1020                         On 09/17/2023, patient should be placed in hospital observation services under my care in collaboration with Dr. Maikel Jiang.      Marilyn Jeffries PA-C  Department of Hospital Medicine  [facility] - Emergency Dept

## 2023-09-17 NOTE — HPI
Pietro Ditez is a 63 y.o. male with PMHx of GERD, HLD, and anxiety, presenting with complaints of bright red blood per rectum.  Patient reports waking up and having multiple episodes of diarrhea with bright red blood throughout.  Patient states during these episodes he was diaphoretic and nauseous.  He denies any vomiting or abdominal pain.  Patient denies any previous history of GI bleed/rectal bleed.  He was in his normal state of health prior to this episode. Denies any chest pain, shortness of breath, cough, fevers or chills, weight changes, appetite changes, dysuria.    In the ED: AFVSS. Labs grossly unremarkable. Cr 1.1, baseline. T&S completed.

## 2023-09-17 NOTE — ED TRIAGE NOTES
"Pietro Dietz, a 63 y.o. male presents to the via ED EMS w/ complaint of rectal bleeding. Pt states about x 1 hours ago, pt was experiencing "bright red bloody diarrhea." States, "I was diaphoretic, had cold sweats, and felt like I was going to pass out." Pt states, "I feel fine now." Takes 81 mg aspirin. Denies trauma. Denies CP, abd pain, n/v, or any other symptoms @ this time.    Triage note:  Chief Complaint   Patient presents with    Rectal Bleeding     Bib ems for "rectal bleeding" per pt he passed " bright red blood in the toilet.  Unwitnessed by ems.      Review of patient's allergies indicates:   Allergen Reactions    Erythromycin Other (See Comments)     Causes "stomach burning"     Past Medical History:   Diagnosis Date    Fatty liver     GERD (gastroesophageal reflux disease)     Hyperlipidemia     Sleep apnea       "

## 2023-09-17 NOTE — ED PROVIDER NOTES
"Encounter Date: 9/17/2023       History     Chief Complaint   Patient presents with    Rectal Bleeding     Bib ems for "rectal bleeding" per pt he passed " bright red blood in the toilet.  Unwitnessed by ems.      Pietro Dietz is a 63 y.o. male ,PMH GERD and HLD, presenting with complaints of bright red blood per rectum.  Patient reports waking up and having multiple episodes of diarrhea with bright red blood throughout.  Patient states during these episodes he was diaphoretic and nauseous.  He denies any vomiting or abdominal pain.  Patient denies any previous history of GI bleed/rectal bleed.  He was in his normal state of health prior to this episode with no recent sick symptoms; denies any chest pain, shortness of breath, fevers.    The history is provided by the patient.     Review of patient's allergies indicates:   Allergen Reactions    Erythromycin Other (See Comments)     Causes "stomach burning"     Past Medical History:   Diagnosis Date    Fatty liver     GERD (gastroesophageal reflux disease)     Hyperlipidemia     Sleep apnea      Past Surgical History:   Procedure Laterality Date    APPENDECTOMY      5 years old    COSMETIC SURGERY      EYE SURGERY      Lasik    OPEN REDUCTION AND INTERNAL FIXATION (ORIF) OF FRACTURE OF CALCANEUS Right 11/05/2021    Procedure: ORIF, FRACTURE, CALCANEUS;  Surgeon: Nichole Ortiz MD;  Location: [facility] OR [address] [place] [address];  Service: Orthopedics;  Laterality: Right;    VITRECTOMY       Family History   Problem Relation Age of Onset    Hypertension Mother     Stroke Father 60    Heart disease Father     Heart disease Brother 66    Prostate cancer Neg Hx     Kidney disease Neg Hx      Social History     Tobacco Use    Smoking status: Never    Smokeless tobacco: Never   Substance Use Topics    Alcohol use: Yes     Alcohol/week: 3.0 standard drinks of alcohol     Types: 3 Shots of liquor per week    Drug use: No     Review of Systems    Physical Exam     Initial Vitals [09/17/23 0622] "   BP Pulse Resp Temp SpO2   116/64 66 18 97.8 °F (36.6 °C) 98 %      MAP       --         Physical Exam    Nursing note and vitals reviewed.  Constitutional: He appears well-developed and well-nourished. He is not diaphoretic. No distress.   HENT:   Head: Normocephalic.   Eyes: Conjunctivae and EOM are normal.   Neck:   Normal range of motion.  Cardiovascular:  Normal rate and regular rhythm.     Exam reveals no gallop and no friction rub.       No murmur heard.  Pulmonary/Chest: Breath sounds normal. No respiratory distress. He has no wheezes. He has no rhonchi. He has no rales.   Abdominal: Abdomen is soft. He exhibits no distension. There is no abdominal tenderness. There is no rebound and no guarding.   Genitourinary: Rectum:      Guaiac result positive.   Guaiac positive stool. : Acceptable.   Genitourinary Comments: No evidence of external hemorrhoid or anal fissure on external exam.       Musculoskeletal:         General: No edema.      Cervical back: Normal range of motion.     Neurological: He is alert. GCS score is 15. GCS eye subscore is 4. GCS verbal subscore is 5. GCS motor subscore is 6.   Skin: Skin is warm and dry. Capillary refill takes less than 2 seconds. There is pallor.         ED Course   Procedures  Labs Reviewed   CBC W/ AUTO DIFFERENTIAL - Abnormal; Notable for the following components:       Result Value    Gran % 74.8 (*)     Lymph % 15.5 (*)     All other components within normal limits   COMPREHENSIVE METABOLIC PANEL - Abnormal; Notable for the following components:    CO2 21 (*)     BUN 30 (*)     Calcium 8.6 (*)     ALT 48 (*)     Anion Gap 7 (*)     All other components within normal limits   CBC W/ AUTO DIFFERENTIAL - Abnormal; Notable for the following components:    RBC 4.54 (*)     Hemoglobin 13.5 (*)     All other components within normal limits   POCT GLUCOSE, HAND-HELD DEVICE - Abnormal; Notable for the following components:    POC Glucose 112 (*)     All other  components within normal limits   POCT GLUCOSE - Abnormal; Notable for the following components:    POCT Glucose 112 (*)     All other components within normal limits   PROTIME-INR   APTT   HIV 1 / 2 ANTIBODY   HEPATITIS C ANTIBODY   POCT GLUCOSE, HAND-HELD DEVICE   TYPE & SCREEN        ECG Results              EKG 12-lead (Final result)  Result time 09/17/23 14:37:46      Final result by Interface, Lab In [facility] (09/17/23 14:37:46)                   Narrative:    Test Reason : K62.5,    Vent. Rate : 065 BPM     Atrial Rate : 065 BPM     P-R Int : 160 ms          QRS Dur : 100 ms      QT Int : 404 ms       P-R-T Axes : 048 053 044 degrees     QTc Int : 420 ms    Normal sinus rhythm  Normal ECG  No previous ECGs available  Confirmed by Ivan PATEL MD ([id number]) on 9/17/2023 2:37:36 PM    Referred By: [email or web address]   SELF           Confirmed By:Ivan PATEL MD                                  Imaging Results    None          Medications   atorvastatin tablet 40 mg (has no administration in time range)   paroxetine tablet 10 mg (has no administration in time range)   pantoprazole injection 40 mg (40 mg Intravenous Given 9/17/23 1045)   ondansetron disintegrating tablet 8 mg (has no administration in time range)   prochlorperazine injection Soln 5 mg (has no administration in time range)   polyethylene glycol packet 17 g (has no administration in time range)   acetaminophen tablet 650 mg (has no administration in time range)   aluminum-magnesium hydroxide-simethicone 200-200-20 mg/5 mL suspension 30 mL (has no administration in time range)   naloxone 0.4 mg/mL injection 0.02 mg (has no administration in time range)   glucose chewable tablet 16 g (has no administration in time range)   glucose chewable tablet 24 g (has no administration in time range)   glucagon (human recombinant) injection 1 mg (has no administration in time range)   melatonin tablet 9 mg (has no administration in time range)   dextrose 10% bolus 125 mL 125 mL  (has no administration in time range)   dextrose 10% bolus 250 mL 250 mL (has no administration in time range)   finasteride tablet 5 mg (has no administration in time range)   lactated ringers bolus 500 mL (0 mLs Intravenous Stopped 9/17/23 1146)     Medical Decision Making  Pietro Dietz  is a 63 y.o. male, presenting with complaints of bright red blood per rectum, history of present illness obtained from patient as seen above. At time of initial exam patient found resting comfortably in bed, able to provide accurate history of present illness and tolerated physical exam well. Patient found to be well appearing no acute distress or fatigue, stable. Exam notable as above.     DDX includes but is not limited to:  Internal hemorrhoids, external hemorrhoids, anal fissure, lower GI bleed, upper GI bleed.  Based on laboratory testing and overall clinical picture with H&H within normal limits, hemodynamically stable throughout his ED stay; patient unlikely to have life-threatening GI bleeding.  Type and screen placed in the event the patient becomes unstable and requires future transfusion.  Rectal exam did not demonstrate external hemorrhoids or anal fissures but showed carrie blood, patient denies any pain at the rectum during diarrhea episodes.  Camille Blatchford score is 4, which makes patient high risk. I discussed with HM who will place in observation for suspected lower GI bleed.       Data Reviewed/Counseling: I have reviwed the patient's vital signs, nursing notes, and other relevant tests/information. Any incidental findings were discussed with the patient. I had a detailed discussion with the patient regarding the historical points, exam findings, and any diagnostic results supporting the diagnosis. Discussed case with hospital medicine, who agreed to admit patient.  Disposition: Observation     Problems Addressed:  BRBPR (bright red blood per rectum): acute illness or injury    Amount and/or Complexity of  Data Reviewed  External Data Reviewed: radiology.     Details: Colonoscopy from 8/13/2018 showed multiple diverticula with mixed openings in descending and sigmoid colon. Diverticulosis of moderate severity. Also showed small grade 1 internal and external hemorrhoids.   Labs: ordered. Decision-making details documented in ED Course.  ECG/medicine tests: ordered and independent interpretation performed. Decision-making details documented in ED Course.    Risk  Prescription drug management.  Decision regarding hospitalization.              Attending Attestation:   Physician Attestation Statement for Resident:  As the supervising MD   Physician Attestation Statement: I have personally seen and examined this patient.   I agree with the above history.  -:   As the supervising MD I agree with the above PE.     As the supervising MD I agree with the above treatment, course, plan, and disposition.   -: See ED course for additional attending MDM                     ED Course as of 09/17/23 1607   Sun Sep 17, 2023   0705 EKG independently interpreted by me shows normal sinus rhythm at a rate of 65, no STEMI, normal intervals, overall unremarkable [BD]   0826 CBC auto differential(!)  CBC unremarkable without leukocytosis, significant anemia, or decreased platelets   [BD]   0854 Comprehensive metabolic panel(!)  CMP remarkable for elevated BUN, which is common and GI bleeds.  Otherwise, no significant electrolyte derangement or impaired renal function [BD]   0855 Patient has a Camille Blatchford bleeding score of 4, which makes him high risk. Will discuss with HM for admission. [BD]   1013 Resident discussed the case with Hospital Medicine who place the patient observation to their service. He remains hemodynamically stable and agrees with the plan.  [BD]   1013 ATTENDING PHYSICIAN ATTESTATION    I have repeated the key portions of the resident's history and physical face to face with the patient, reviewed and agree with the  resident medical documentation except as noted below, and supervised and managed the medical care of the patient.      Hardy Mooney MD  Department of Emergency Medicine   [BD]      ED Course User Index  [BD] Hardy Mooney MD                      Clinical Impression:   Final diagnoses:  [K62.5] BRBPR (bright red blood per rectum)  [K92.2] Lower GI bleed (Primary)        ED Disposition Condition    Observation                 Melany Barcenas MD  Resident  09/17/23 0222       Hardy Mooney MD  09/17/23 5168

## 2023-09-17 NOTE — SUBJECTIVE & OBJECTIVE
"Past Medical History:   Diagnosis Date    Fatty liver     GERD (gastroesophageal reflux disease)     Hyperlipidemia     Sleep apnea        Past Surgical History:   Procedure Laterality Date    APPENDECTOMY      5 years old    COSMETIC SURGERY      EYE SURGERY      Lasik    OPEN REDUCTION AND INTERNAL FIXATION (ORIF) OF FRACTURE OF CALCANEUS Right 11/05/2021    Procedure: ORIF, FRACTURE, CALCANEUS;  Surgeon: Nichole Ortiz MD;  Location: [facility] OR [address] [place] [address];  Service: Orthopedics;  Laterality: Right;    VITRECTOMY         Review of patient's allergies indicates:   Allergen Reactions    Erythromycin Other (See Comments)     Causes "stomach burning"       No current facility-administered medications on file prior to encounter.     Current Outpatient Medications on File Prior to Encounter   Medication Sig    atorvastatin (LIPITOR) 40 MG tablet TAKE 1 TABLET EVERY EVENING    finasteride (PROSCAR) 5 mg tablet TAKE 1 TABLET EVERY EVENING    omeprazole (PRILOSEC) 20 MG capsule Take 1 capsule (20 mg total) by mouth every evening.    paroxetine (PAXIL) 10 MG tablet Take 1 tablet (10 mg total) by mouth every evening.    VASCEPA 1 gram Cap Take 1 capsule (1,000 mg total) by mouth every evening.    [DISCONTINUED] ALAHIST CF 2-10-20 mg Tab Take 1 tablet by mouth every 6 (six) hours.    [DISCONTINUED] azelastine (ASTELIN) 137 mcg (0.1 %) nasal spray 1 spray 2 (two) times daily.     Family History       Problem Relation (Age of Onset)    Heart disease Father, Brother (66)    Hypertension Mother    Stroke Father (60)          Tobacco Use    Smoking status: Never    Smokeless tobacco: Never   Substance and Sexual Activity    Alcohol use: Yes     Alcohol/week: 3.0 standard drinks of alcohol     Types: 3 Shots of liquor per week    Drug use: No    Sexual activity: Not Currently     Partners: Female     Review of Systems   Constitutional:  Positive for fever. Negative for activity change and chills.   HENT:  Negative for trouble swallowing. "    Eyes:  Negative for photophobia and visual disturbance.   Respiratory:  Negative for chest tightness, shortness of breath and wheezing.    Cardiovascular:  Negative for chest pain, palpitations and leg swelling.   Gastrointestinal:  Positive for blood in stool and nausea. Negative for abdominal pain, constipation, diarrhea and vomiting.   Genitourinary:  Negative for dysuria, frequency, hematuria and urgency.   Musculoskeletal:  Negative for arthralgias, back pain and gait problem.   Skin:  Negative for color change and rash.   Neurological:  Positive for weakness. Negative for dizziness, syncope, light-headedness, numbness and headaches.   Psychiatric/Behavioral:  Negative for agitation and confusion. The patient is not nervous/anxious.      Objective:     Vital Signs (Most Recent):  Temp: 97.8 °F (36.6 °C) (09/17/23 0700)  Pulse: 69 (09/17/23 0922)  Resp: 18 (09/17/23 0922)  BP: 122/65 (09/17/23 0922)  SpO2: 95 % (09/17/23 0922) Vital Signs (24h Range):  Temp:  [97.8 °F (36.6 °C)] 97.8 °F (36.6 °C)  Pulse:  [61-69] 69  Resp:  [14-18] 18  SpO2:  [95 %-98 %] 95 %  BP: (114-128)/(64-75) 122/65     Weight: 96.6 kg (213 lb)  Body mass index is 34.38 kg/m².     Physical Exam  Vitals and nursing note reviewed.   Constitutional:       General: He is not in acute distress.     Appearance: He is well-developed.   HENT:      Head: Normocephalic and atraumatic.      Mouth/Throat:      Pharynx: No oropharyngeal exudate.   Eyes:      Conjunctiva/sclera: Conjunctivae normal.      Pupils: Pupils are equal, round, and reactive to light.   Cardiovascular:      Rate and Rhythm: Normal rate and regular rhythm.      Heart sounds: Normal heart sounds.   Pulmonary:      Effort: Pulmonary effort is normal. No respiratory distress.      Breath sounds: Normal breath sounds. No wheezing.   Abdominal:      General: Bowel sounds are normal. There is no distension.      Palpations: Abdomen is soft.      Tenderness: There is no abdominal  tenderness.   Musculoskeletal:         General: No tenderness. Normal range of motion.      Cervical back: Normal range of motion and neck supple.   Lymphadenopathy:      Cervical: No cervical adenopathy.   Skin:     General: Skin is warm and dry.      Capillary Refill: Capillary refill takes less than 2 seconds.      Findings: No rash.   Neurological:      Mental Status: He is alert and oriented to person, place, and time.      Cranial Nerves: No cranial nerve deficit.      Sensory: No sensory deficit.      Coordination: Coordination normal.   Psychiatric:         Behavior: Behavior normal.         Thought Content: Thought content normal.         Judgment: Judgment normal.              CRANIAL NERVES     CN III, IV, VI   Pupils are equal, round, and reactive to light.       Significant Labs: All pertinent labs within the past 24 hours have been reviewed.    Significant Imaging: I have reviewed all pertinent imaging results/findings within the past 24 hours.

## 2023-09-17 NOTE — CONSULTS
[facility]  Gastroenterology  Consult Note    Patient Name: Pietro Dietz  MRN: [id number]  Admission Date: 9/17/2023  Hospital Length of Stay: 0 days  Code Status: Full Code   Attending Provider: Maikel Jiang MD   Consulting Provider: Garcia Regalado MD  Primary Care Physician: Nick Whitatker MD  Principal Problem:BRBPR (bright red blood per rectum)    Inpatient consult to Gastroenterology  Consult performed by: Garcia Regalado MD  Consult ordered by: Marilyn Jeffries PA-C        Subjective:     HPI: Pietro Dietz is a 63 y.o. male with history of GERD, HLD, anxiety here for red blood per rectum. Reportedly dark red blood. Awoke from sleep diaphoretic this morning and had diarrhea with dark red blood. Denies abd pain. Having some nausea. Has chronic GERD, takes omeprazole. HDS on arrival. Hgb 14. INR 1.0. Last colonoscopy in 2018, diverticulosis and small internal hemorrhoids were present, repeat in 5 years.        Objective:     Vitals:    09/17/23 1407   BP: 117/73   Pulse: 73   Resp: 16   Temp:          Constitutional:  not in acute distress and well developed  HENT: Head: Normal, normocephalic  Eyes: conjunctiva clear and sclera nonicteric  GI: soft, non-tender, without masses or organomegaly  Skin: normal color  Neurological: alert, oriented x3  Psychiatric: mood and affect are within normal limits, pt is a good historian; no memory problems were noted    Significant Labs:  Reviewed the following pertinent laboratory tests: WBC 8. Notable for Hgb 14.    Significant Imaging:  No pertinent imaging.      Assessment/Plan:     Pietro Dietz is a 63 y.o. male with history of GERD, HLD, anxiety here for episode of dark red blood in stool. Associated with diaphoresis. Hgb 14 and HDS. Suspect hemorrhoidal bleed. Possibly very mild diverticular bleed. Will observe overnight, trend stools and hemoglobin, and if stable can arrange outpatient colonoscopy.    Problem  List:  BRBPR    Recommendations:  - Clear liquid diet   - Monitor Hgb daily  - No plans for inpatient colonoscopy at this time. If stable without ongoing bleeding in AM, will arrange as outpatient.  - Transfuse to keep Hgb >7, plts >50  - Hold anticoagulants if safe to do so per primary team  - If becomes hemodynamically unstable with associated large volume hematochezia, recommend STAT CTA and IR embolization if positive      Thank you for involving us in the care of Pietro Dietz. Please call with any additional questions, concerns or changes in the patient's clinical status. We will continue to follow.     Garcia Regalado MD  Gastroenterology Fellow PGY V  [facility]

## 2023-09-17 NOTE — PHARMACY MED REC
"Admission Medication History     The home medication history was taken by Laura Kaiser.    You may go to "Admission" then "Reconcile Home Medications" tabs to review and/or act upon these items.     The home medication list has been updated by the Pharmacy department.   Please read ALL comments highlighted in yellow.   Please address this information as you see fit.    Feel free to contact us if you have any questions or require assistance.      The medications listed below were removed from the home medication list. Please reorder if appropriate:  Patient reports no longer taking the following medication(s):  ALAHIST CF 2-10-20 MG TABLET  AZELASTINE 173 MCG NASAL SPRAY    Current Outpatient Medications on File Prior to Encounter   Medication Sig    aspirin (ECOTRIN) 81 MG EC tablet   Take 81 mg by mouth once daily.    atorvastatin (LIPITOR) 40 MG tablet   TAKE 1 TABLET EVERY EVENING    fexofenadine (ALLEGRA) 180 MG tablet   Take 180 mg by mouth once daily.    finasteride (PROSCAR) 5 mg tablet   TAKE 1 TABLET EVERY EVENING    omeprazole (PRILOSEC) 20 MG capsule   Take 1 capsule (20 mg total) by mouth every evening.    paroxetine (PAXIL) 10 MG tablet   Take 1 tablet (10 mg total) by mouth every evening.    VASCEPA 1 gram Cap     Take 1 capsule (1,000 mg total) by mouth every evening.       Laura Kaiser  EXT [id number]                  .          "

## 2023-09-17 NOTE — ASSESSMENT & PLAN NOTE
Diverticulosis    Presents with bright bloody stools this am, associated symptoms of nausea and generalized weakness. Last colonoscopy in 2018 revealed moderated diverticulosis as well as internal and external hemorrhoids. Suspect diverticular vs hemorrhoidal bleeding.   · GIB pathway   · HDS  · Hgb stable 14.0  · Start Protonix 40mg IV BID  · GI consulted, appreciate assistance  · NPO at midnight for possible EGD. CLD for now.  · Type and screen completed   · Transfuse for Hemoglobin <7 -> will need blood consent       INR result from 8/11/23 pulled through care everywhere, 3.2    Pt discharged from hospital 8/11/23  Pt was on heparin drip while in the hospital.

## 2023-09-18 ENCOUNTER — TELEPHONE (OUTPATIENT)
Dept: INTERNAL MEDICINE | Facility: CLINIC | Age: 63
End: 2023-09-18
Payer: COMMERCIAL

## 2023-09-18 VITALS
TEMPERATURE: 98 F | RESPIRATION RATE: 18 BRPM | HEIGHT: 66 IN | BODY MASS INDEX: 33.43 KG/M2 | DIASTOLIC BLOOD PRESSURE: 75 MMHG | WEIGHT: 208 LBS | HEART RATE: 71 BPM | SYSTOLIC BLOOD PRESSURE: 125 MMHG | OXYGEN SATURATION: 95 %

## 2023-09-18 LAB
ANION GAP SERPL CALC-SCNC: 10 MMOL/L (ref 8–16)
BASOPHILS # BLD AUTO: 0.03 K/UL (ref 0–0.2)
BASOPHILS NFR BLD: 0.4 % (ref 0–1.9)
BUN SERPL-MCNC: 16 MG/DL (ref 8–23)
CALCIUM SERPL-MCNC: 8.7 MG/DL (ref 8.7–10.5)
CHLORIDE SERPL-SCNC: 108 MMOL/L (ref 95–110)
CO2 SERPL-SCNC: 21 MMOL/L (ref 23–29)
CREAT SERPL-MCNC: 1 MG/DL (ref 0.5–1.4)
DIFFERENTIAL METHOD: ABNORMAL
EOSINOPHIL # BLD AUTO: 0.2 K/UL (ref 0–0.5)
EOSINOPHIL NFR BLD: 3.1 % (ref 0–8)
ERYTHROCYTE [DISTWIDTH] IN BLOOD BY AUTOMATED COUNT: 13.2 % (ref 11.5–14.5)
EST. GFR  (NO RACE VARIABLE): >60 ML/MIN/1.73 M^2
GLUCOSE SERPL-MCNC: 93 MG/DL (ref 70–110)
HCT VFR BLD AUTO: 38.9 % (ref 40–54)
HGB BLD-MCNC: 12.9 G/DL (ref 14–18)
IMM GRANULOCYTES # BLD AUTO: 0.02 K/UL (ref 0–0.04)
IMM GRANULOCYTES NFR BLD AUTO: 0.3 % (ref 0–0.5)
LYMPHOCYTES # BLD AUTO: 2 K/UL (ref 1–4.8)
LYMPHOCYTES NFR BLD: 25.6 % (ref 18–48)
MCH RBC QN AUTO: 29.7 PG (ref 27–31)
MCHC RBC AUTO-ENTMCNC: 33.2 G/DL (ref 32–36)
MCV RBC AUTO: 90 FL (ref 82–98)
MONOCYTES # BLD AUTO: 0.6 K/UL (ref 0.3–1)
MONOCYTES NFR BLD: 8.1 % (ref 4–15)
NEUTROPHILS # BLD AUTO: 4.8 K/UL (ref 1.8–7.7)
NEUTROPHILS NFR BLD: 62.5 % (ref 38–73)
NRBC BLD-RTO: 0 /100 WBC
PLATELET # BLD AUTO: 200 K/UL (ref 150–450)
PMV BLD AUTO: 11.6 FL (ref 9.2–12.9)
POTASSIUM SERPL-SCNC: 3.6 MMOL/L (ref 3.5–5.1)
RBC # BLD AUTO: 4.34 M/UL (ref 4.6–6.2)
SODIUM SERPL-SCNC: 139 MMOL/L (ref 136–145)
WBC # BLD AUTO: 7.74 K/UL (ref 3.9–12.7)

## 2023-09-18 PROCEDURE — 80048 BASIC METABOLIC PNL TOTAL CA: CPT

## 2023-09-18 PROCEDURE — 99204 OFFICE O/P NEW MOD 45 MIN: CPT | Mod: ,,, | Performed by: INTERNAL MEDICINE

## 2023-09-18 PROCEDURE — G0378 HOSPITAL OBSERVATION PER HR: HCPCS

## 2023-09-18 PROCEDURE — 36415 COLL VENOUS BLD VENIPUNCTURE: CPT

## 2023-09-18 PROCEDURE — 94761 N-INVAS EAR/PLS OXIMETRY MLT: CPT

## 2023-09-18 PROCEDURE — C9113 INJ PANTOPRAZOLE SODIUM, VIA: HCPCS

## 2023-09-18 PROCEDURE — 85025 COMPLETE CBC W/AUTO DIFF WBC: CPT

## 2023-09-18 PROCEDURE — 99239 PR HOSPITAL DISCHARGE DAY,>30 MIN: ICD-10-PCS | Mod: ,,,

## 2023-09-18 PROCEDURE — 96376 TX/PRO/DX INJ SAME DRUG ADON: CPT

## 2023-09-18 PROCEDURE — 99239 HOSP IP/OBS DSCHRG MGMT >30: CPT | Mod: ,,,

## 2023-09-18 PROCEDURE — 99499 UNLISTED E&M SERVICE: CPT | Mod: ,,, | Performed by: INTERNAL MEDICINE

## 2023-09-18 PROCEDURE — 99204 PR OFFICE/OUTPT VISIT, NEW, LEVL IV, 45-59 MIN: ICD-10-PCS | Mod: ,,, | Performed by: INTERNAL MEDICINE

## 2023-09-18 PROCEDURE — 99499 NO LOS: ICD-10-PCS | Mod: ,,, | Performed by: INTERNAL MEDICINE

## 2023-09-18 PROCEDURE — 63600175 PHARM REV CODE 636 W HCPCS

## 2023-09-18 RX ORDER — ASPIRIN 81 MG/1
81 TABLET ORAL DAILY
Refills: 0 | Status: CANCELLED
Start: 2023-09-18

## 2023-09-18 RX ADMIN — PANTOPRAZOLE SODIUM 40 MG: 40 INJECTION, POWDER, FOR SOLUTION INTRAVENOUS at 08:09

## 2023-09-18 NOTE — HOSPITAL COURSE
Pietro Dietz was placed in  observation after 1 episode of BRBPR. Known hx of diverticulosis and internal hemorrhoids, suspected cause. No recurrence of bleeding since arrival to ED. Hgb stable. HDS. GI consulted, recommend outpatient follow up and colonoscopy - they will arrange. OK to continue ASA. Discharge plan discussed and patient expressed understanding. All questions answered.

## 2023-09-18 NOTE — PLAN OF CARE
Problem: Adjustment to Illness (Gastrointestinal Bleeding)  Goal: Optimal Coping with Acute Illness  9/18/2023 1043 by Gary Davis RN  Outcome: Ongoing, Progressing  9/18/2023 1043 by Gary Davis RN  Outcome: Ongoing, Progressing     Problem: Adult Inpatient Plan of Care  Goal: Plan of Care Review  9/18/2023 1043 by Gary Davis RN  Outcome: Ongoing, Progressing  9/18/2023 1043 by Gary Davis RN  Outcome: Ongoing, Progressing  Goal: Patient-Specific Goal (Individualized)  9/18/2023 1043 by Gary Davis RN  Outcome: Ongoing, Progressing  9/18/2023 1043 by Gary Davis RN  Outcome: Ongoing, Progressing  Goal: Absence of Hospital-Acquired Illness or Injury  9/18/2023 1043 by Gary Davis RN  Outcome: Ongoing, Progressing  9/18/2023 1043 by Gary Davis RN  Outcome: Ongoing, Progressing  Goal: Optimal Comfort and Wellbeing  9/18/2023 1043 by Gary Davis RN  Outcome: Ongoing, Progressing  9/18/2023 1043 by Gary Davis RN  Outcome: Ongoing, Progressing  Goal: Readiness for Transition of Care  9/18/2023 1043 by Gary Davis RN  Outcome: Ongoing, Progressing  9/18/2023 1043 by Gary Davis RN  Outcome: Ongoing, Progressing     Problem: Bleeding (Gastrointestinal Bleeding)  Goal: Hemostasis  9/18/2023 1043 by Gary Davis RN  Outcome: Ongoing, Progressing  9/18/2023 1043 by Gary Davis RN  Outcome: Ongoing, Progressing     Problem: Infection  Goal: Absence of Infection Signs and Symptoms  9/18/2023 1043 by Gary Davis RN  Outcome: Ongoing, Progressing  9/18/2023 1043 by Gary Davis RN  Outcome: Ongoing, Progressing     Problem: Fall Injury Risk  Goal: Absence of Fall and Fall-Related Injury  9/18/2023 1043 by Gary Davis RN  Outcome: Ongoing, Progressing  9/18/2023 1043 by Gary Davis RN  Outcome: Ongoing, Progressing     Problem: Pain Acute  Goal: Acceptable Pain Control and Functional Ability  9/18/2023 1043 by Gary Davis RN  Outcome: Ongoing,  Progressing  9/18/2023 1043 by Gary Davis, RN  Outcome: Ongoing, Progressing     Problem: Obstructive Sleep Apnea Risk or Actual Comorbidity Management  Goal: Unobstructed Breathing During Sleep  9/18/2023 1043 by Gary Davis, RN  Outcome: Ongoing, Progressing  9/18/2023 1043 by Gary Davis, RN  Outcome: Ongoing, Progressing

## 2023-09-18 NOTE — DISCHARGE SUMMARY
[facility] [address] [place] [address] Medicine  Discharge Summary      Patient Name: Pietro Dietz  MRN: [id number]  ROGELIO: [id number]  Patient Class: OP- Observation  Admission Date: 9/17/2023  Hospital Length of Stay: 0 days  Discharge Date and Time: 9/18/2023  1:19 PM  Attending Physician: Edith att. providers found   Discharging Provider: Rey Cheung PA-C  Primary Care Provider: Nick Whittaker MD  [facility] Medicine Team: [facility] Rey Cheung PA-C  Primary Care Team: [facility]    HPI:   Pietro Dietz is a 63 y.o. male with PMHx of GERD, HLD, and anxiety, presenting with complaints of bright red blood per rectum.  Patient reports waking up and having multiple episodes of diarrhea with bright red blood throughout.  Patient states during these episodes he was diaphoretic and nauseous.  He denies any vomiting or abdominal pain.  Patient denies any previous history of GI bleed/rectal bleed.  He was in his normal state of health prior to this episode. Denies any chest pain, shortness of breath, cough, fevers or chills, weight changes, appetite changes, dysuria.    In the ED: AFVSS. Labs grossly unremarkable. Cr 1.1, baseline. T&S completed.      * No surgery found *      Hospital Course:   Pietro Dietz was placed in  observation after 1 episode of BRBPR. Known hx of diverticulosis and internal hemorrhoids, suspected cause. No recurrence of bleeding since arrival to ED. Hgb stable. HDS. GI consulted, recommend outpatient follow up and colonoscopy - they will arrange. OK to continue ASA. Discharge plan discussed and patient expressed understanding. All questions answered.          Goals of Care Treatment Preferences:  Code Status: Full Code      Consults:   Consults (From admission, onward)        Status Ordering Provider     Inpatient consult to Gastroenterology  Once        Provider:  (Not yet assigned)    Completed REY CHEUNG          No new Assessment & Plan notes have been  filed under this hospital service since the last note was generated.  Service: Hospital Medicine    Final Active Diagnoses:    Diagnosis Date Noted POA    PRINCIPAL PROBLEM:  BRBPR (bright red blood per rectum) [K62.5] 09/17/2023 Yes    BPH (benign prostatic hyperplasia) [N40.0] 09/17/2023 Yes    Diverticulosis [K57.90] 09/17/2023 Yes    Anxiety [F41.9] 06/07/2023 Yes    Hyperlipidemia [E78.5]  Yes    GERD (gastroesophageal reflux disease) [K21.9]  Yes      Problems Resolved During this Admission:       Discharged Condition: good    Disposition: Home or Self Care    Follow Up:   Follow-up Information     Nick Whittaker MD Follow up.    Specialty: Family Medicine  Why: Message sent for nurse to call and schedule follow up appointment; however, if you do not hear from someone within 48 hours contact the number listed.  Contact information:  [address] [facility] [place] [address]  [phone]                       Patient Instructions:      Diet Cardiac     Notify your health care provider if you experience any of the following:  persistent nausea and vomiting or diarrhea     Notify your health care provider if you experience any of the following:  severe uncontrolled pain     Notify your health care provider if you experience any of the following:  persistent dizziness, light-headedness, or visual disturbances     Notify your health care provider if you experience any of the following:  increased confusion or weakness     Notify your health care provider if you experience any of the following:   Order Comments: Persistent bleeding     Activity as tolerated       Significant Diagnostic Studies: n/a    Pending Diagnostic Studies:     Procedure Component Value Units Date/Time    HIV 1/2 Ag/Ab (4th Gen) [[id number]] Collected: 09/17/23 0702    Order Status: Sent Lab Status: In process Updated: 09/17/23 0703    Specimen: Blood     Hepatitis C Antibody [[id number]] Collected: 09/17/23 0702    Order Status: Sent  Lab Status: In process Updated: 09/17/23 0703    Specimen: Blood          Medications:  Reconciled Home Medications:      Medication List      CONTINUE taking these medications    aspirin 81 MG EC tablet  Commonly known as: ECOTRIN  Take 81 mg by mouth once daily.     atorvastatin 40 MG tablet  Commonly known as: LIPITOR  TAKE 1 TABLET EVERY EVENING     fexofenadine 180 MG tablet  Commonly known as: ALLEGRA  Take 180 mg by mouth once daily.     finasteride 5 mg tablet  Commonly known as: PROSCAR  TAKE 1 TABLET EVERY EVENING     omeprazole 20 MG capsule  Commonly known as: PRILOSEC  Take 1 capsule (20 mg total) by mouth every evening.     paroxetine 10 MG tablet  Commonly known as: PAXIL  Take 1 tablet (10 mg total) by mouth every evening.     VASCEPA 1 gram Cap  Generic drug: icosapent ethyL  Take 1 capsule (1,000 mg total) by mouth every evening.            Indwelling Lines/Drains at time of discharge:   Lines/Drains/Airways     Epidural Line  Duration                Perineural Analgesia/Anesthesia Assessment (using dermatomes) 11/05/21 0624 682 days                Time spent on the discharge of patient: 36 minutes         Marilyn Jeffries PA-C  Department of Hospital Medicine  [facility] - Observation 11H

## 2023-09-18 NOTE — PLAN OF CARE
[facility]  Initial Discharge Assessment       Primary Care Provider: Nick Whittaker MD    Admission Diagnosis: Lower GI bleed [K92.2]  BRBPR (bright red blood per rectum) [K62.5]  Chest pain [R07.9]    Admission Date: 9/17/2023  Expected Discharge Date: 9/18/2023         Payor: BLUE CROSS BLUE SHIELD / Plan: [place] HMO / Product Type: HMO /     Extended Emergency Contact Information  Primary Emergency Contact: Lisa Dietz  Address: [address] [place] [address] [place] [address] [place]  Mobile Phone: [phone]  Relation: Spouse  Preferred language: English    Discharge Plan A: (P) Home with family  Discharge Plan B: (P) Home with family      [id number] [place] - [address] AT [facility] [address]  [address]  [place] [address]  Phone: [phone] Fax: [phone]    [facility] [address] [address]  Phone: [phone] Fax: [phone]             SW completed Discharge Planning Assessment with patient via bedside. Discharge planning booklet given to patient/family and whiteboard updated with BONIFACIO and phone #. All questions answered.    Patient reported that his wife will provide transportation upon discharge.     Patient reported that he lives at home with his wife, and prior to hospitalization he was independent with his ADL's. Patient reported that he is not on dialysis and does not go to a Coumadin clinic.      Patient lives in a two story home, and reported that he does not have difficulty climbing the stairs.       Umu Kearns LMSW  [facility]  Ext. [phone]

## 2023-09-18 NOTE — PLAN OF CARE
Problem: Adjustment to Illness (Gastrointestinal Bleeding)  Goal: Optimal Coping with Acute Illness  Outcome: Ongoing, Progressing     Problem: Bleeding (Gastrointestinal Bleeding)  Goal: Hemostasis  Outcome: Ongoing, Progressing     Problem: Adult Inpatient Plan of Care  Goal: Absence of Hospital-Acquired Illness or Injury  Outcome: Ongoing, Progressing  Goal: Optimal Comfort and Wellbeing  Outcome: Ongoing, Progressing     Problem: Infection  Goal: Absence of Infection Signs and Symptoms  Outcome: Ongoing, Progressing     Problem: Fall Injury Risk  Goal: Absence of Fall and Fall-Related Injury  Outcome: Ongoing, Progressing     Problem: Pain Acute  Goal: Acceptable Pain Control and Functional Ability  Outcome: Ongoing, Progressing

## 2023-09-18 NOTE — PLAN OF CARE
[facility]  Discharge Final Note    Primary Care Provider: Nick Whittaker MD    Expected Discharge Date: 9/18/2023    Patient discharged to home via personal transportation.     Patient's bedside nurse and patient notified of the above.      Final Discharge Note (most recent)       Final Note - 09/18/23 1434          Final Note    Assessment Type Final Discharge Note (P)      Anticipated Discharge Disposition Home or Self Care (P)         Post-Acute Status    Post-Acute Authorization Other (P)      Other Status No Post-Acute Service Needs (P)                      Important Message from Medicare             Contact Info       Nick Whittaker MD   Specialty: Family Medicine   Relationship: PCP - General    [address] [facility] [address]   Phone: [phone]       Next Steps: Follow up    Instructions: Message sent for nurse to call and schedule follow up appointment; however, if you do not hear from someone within 48 hours contact the number listed.            No future appointments.    SW scheduled post-discharge follow-up appointment and information added to AVS.     Umu Kearns LMSW  [facility]  Ext. [phone]

## 2023-09-18 NOTE — ED NOTES
Telemetry Verification   Patient placed on Telemetry Box  Verified with War Room  Box # [id number]   Monitor Tech    Rate 68   Rhythm Normal sinus

## 2023-09-18 NOTE — TELEPHONE ENCOUNTER
----- Message from Lu Cordova sent at 9/18/2023  2:27 PM CDT -----  Contact: P [phone]  Patient needs a Hosp follow up appt with their PCP only.       When is the next available appointment:   10/03/2023    Symptoms:  Unknown     Discharge date:09/18/2023    Needs to be seen by: PCP within 7 days    Would you prefer an answer via Aspidat?:      Comments:

## 2023-09-18 NOTE — NURSING
PIV x2 removed as well as Tele monitor. VSSAF. Pt and his wife both state that they understand d/c paperwork

## 2023-09-19 ENCOUNTER — TELEPHONE (OUTPATIENT)
Dept: ENDOSCOPY | Facility: HOSPITAL | Age: 63
End: 2023-09-19
Payer: COMMERCIAL

## 2023-09-19 ENCOUNTER — PATIENT MESSAGE (OUTPATIENT)
Dept: ENDOSCOPY | Facility: HOSPITAL | Age: 63
End: 2023-09-19
Payer: COMMERCIAL

## 2023-09-19 DIAGNOSIS — Z12.11 COLON CANCER SCREENING: Primary | ICD-10-CM

## 2023-09-19 DIAGNOSIS — K62.5 BRBPR (BRIGHT RED BLOOD PER RECTUM): ICD-10-CM

## 2023-09-19 DIAGNOSIS — Z86.010 HISTORY OF COLON POLYPS: ICD-10-CM

## 2023-09-19 NOTE — TELEPHONE ENCOUNTER
Spoke to pt to schedule procedure(s) Colonoscopy       Physician to perform procedure(s) Dr. VINH Pelletier  Date of Procedure (s) 9/22/23  Arrival Time 7:45 AM  Time of Procedure(s) .8:45 AM   Location of Procedure(s) [facility] 4th Floor  Type of Rx Prep sent to patient: PEG  Instructions provided to patient via [facility]/email    Patient was informed on the following information and verbalized understanding. Screening questionnaire reviewed with patient and complete. If procedure requires anesthesia, a responsible adult needs to be present to accompany the patient home, patient cannot drive after receiving anesthesia. Appointment details are tentative, especially check-in time. Patient will receive a prep-op call 4 days prior to confirm check-in time for procedure. If applicable the patient should contact their pharmacy to verify Rx for procedure prep is ready for pick-up. Patient was advised to call the scheduling department at [phone] if pharmacy states no Rx is available. Patient was advised to call the endoscopy scheduling department if any questions or concerns arise.      [facility] Scheduling Department

## 2023-09-19 NOTE — TELEPHONE ENCOUNTER
"----- Message from Silvina Lozano sent at 2023 12:42 PM CDT -----  Regarding: FW: Colonoscopy    ----- Message -----  From: Garcia Regalado MD  Sent: 2023  12:07 PM CDT  To: [facility]  Subject: Colonoscopy                                      Procedure: Colonoscopy    Diagnosis: Surveillance colonoscopy - Hx of colon polyps, recent BRBPR    Procedure Timin-4 weeks    #If within 4 weeks selected, please tiff as high priority#    #If greater than 12 weeks, please select "4-12 weeks" and delay sending until 2 months prior to requested date#     Provider: Any GI provider    Location: No Preference    Additional Scheduling Information: No scheduling concerns    Prep Specifications:Standard prep    Have you attached a patient to this message: Yes        "

## 2023-09-21 ENCOUNTER — HOSPITAL ENCOUNTER (OUTPATIENT)
Facility: HOSPITAL | Age: 63
Discharge: HOME | End: 2023-09-22
Attending: EMERGENCY MEDICINE | Admitting: EMERGENCY MEDICINE
Payer: COMMERCIAL

## 2023-09-21 ENCOUNTER — ANESTHESIA EVENT (OUTPATIENT)
Dept: ENDOSCOPY | Facility: HOSPITAL | Age: 63
End: 2023-09-21
Payer: COMMERCIAL

## 2023-09-21 DIAGNOSIS — R07.9 CHEST PAIN: ICD-10-CM

## 2023-09-21 DIAGNOSIS — K92.2 ACUTE GI BLEEDING: ICD-10-CM

## 2023-09-21 PROBLEM — D62 ACUTE BLOOD LOSS ANEMIA: Status: ACTIVE | Noted: 2023-09-21

## 2023-09-21 PROBLEM — K57.30 DIVERTICULOSIS OF COLON: Status: ACTIVE | Noted: 2023-09-21

## 2023-09-21 LAB
ABO + RH BLD: NORMAL
ALBUMIN SERPL BCP-MCNC: 3.5 G/DL (ref 3.5–5.2)
ALP SERPL-CCNC: 84 U/L (ref 55–135)
ALT SERPL W/O P-5'-P-CCNC: 30 U/L (ref 10–44)
ANION GAP SERPL CALC-SCNC: 11 MMOL/L (ref 8–16)
AST SERPL-CCNC: 23 U/L (ref 10–40)
BASOPHILS # BLD AUTO: 0.02 K/UL (ref 0–0.2)
BASOPHILS # BLD AUTO: 0.03 K/UL (ref 0–0.2)
BASOPHILS NFR BLD: 0.3 % (ref 0–1.9)
BASOPHILS NFR BLD: 0.3 % (ref 0–1.9)
BASOPHILS NFR BLD: 0.4 % (ref 0–1.9)
BASOPHILS NFR BLD: 0.5 % (ref 0–1.9)
BILIRUB SERPL-MCNC: 0.9 MG/DL (ref 0.1–1)
BLD GP AB SCN CELLS X3 SERPL QL: NORMAL
BUN SERPL-MCNC: 21 MG/DL (ref 8–23)
BUN SERPL-MCNC: 26 MG/DL (ref 6–30)
CALCIUM SERPL-MCNC: 8.5 MG/DL (ref 8.7–10.5)
CHLORIDE SERPL-SCNC: 109 MMOL/L (ref 95–110)
CHLORIDE SERPL-SCNC: 110 MMOL/L (ref 95–110)
CO2 SERPL-SCNC: 20 MMOL/L (ref 23–29)
CREAT SERPL-MCNC: 1 MG/DL (ref 0.5–1.4)
CREAT SERPL-MCNC: 1.2 MG/DL (ref 0.5–1.4)
DIFFERENTIAL METHOD: ABNORMAL
EOSINOPHIL # BLD AUTO: 0 K/UL (ref 0–0.5)
EOSINOPHIL # BLD AUTO: 0.1 K/UL (ref 0–0.5)
EOSINOPHIL NFR BLD: 0.4 % (ref 0–8)
EOSINOPHIL NFR BLD: 1.3 % (ref 0–8)
EOSINOPHIL NFR BLD: 1.4 % (ref 0–8)
EOSINOPHIL NFR BLD: 2 % (ref 0–8)
ERYTHROCYTE [DISTWIDTH] IN BLOOD BY AUTOMATED COUNT: 13.1 % (ref 11.5–14.5)
ERYTHROCYTE [DISTWIDTH] IN BLOOD BY AUTOMATED COUNT: 13.2 % (ref 11.5–14.5)
ERYTHROCYTE [DISTWIDTH] IN BLOOD BY AUTOMATED COUNT: 13.3 % (ref 11.5–14.5)
ERYTHROCYTE [DISTWIDTH] IN BLOOD BY AUTOMATED COUNT: 13.3 % (ref 11.5–14.5)
EST. GFR  (NO RACE VARIABLE): >60 ML/MIN/1.73 M^2
GLUCOSE SERPL-MCNC: 103 MG/DL (ref 70–110)
GLUCOSE SERPL-MCNC: 107 MG/DL (ref 70–110)
HCT VFR BLD AUTO: 27.8 % (ref 40–54)
HCT VFR BLD AUTO: 29.9 % (ref 40–54)
HCT VFR BLD AUTO: 32.7 % (ref 40–54)
HCT VFR BLD AUTO: 33.9 % (ref 40–54)
HCT VFR BLD CALC: 31 %PCV (ref 36–54)
HGB BLD-MCNC: 10.3 G/DL (ref 14–18)
HGB BLD-MCNC: 11.1 G/DL (ref 14–18)
HGB BLD-MCNC: 11.4 G/DL (ref 14–18)
HGB BLD-MCNC: 9.7 G/DL (ref 14–18)
IMM GRANULOCYTES # BLD AUTO: 0.03 K/UL (ref 0–0.04)
IMM GRANULOCYTES # BLD AUTO: 0.04 K/UL (ref 0–0.04)
IMM GRANULOCYTES NFR BLD AUTO: 0.4 % (ref 0–0.5)
IMM GRANULOCYTES NFR BLD AUTO: 0.5 % (ref 0–0.5)
LYMPHOCYTES # BLD AUTO: 1 K/UL (ref 1–4.8)
LYMPHOCYTES # BLD AUTO: 1.2 K/UL (ref 1–4.8)
LYMPHOCYTES # BLD AUTO: 1.4 K/UL (ref 1–4.8)
LYMPHOCYTES # BLD AUTO: 1.6 K/UL (ref 1–4.8)
LYMPHOCYTES NFR BLD: 10 % (ref 18–48)
LYMPHOCYTES NFR BLD: 19.1 % (ref 18–48)
LYMPHOCYTES NFR BLD: 20.1 % (ref 18–48)
LYMPHOCYTES NFR BLD: 20.7 % (ref 18–48)
MCH RBC QN AUTO: 29.6 PG (ref 27–31)
MCH RBC QN AUTO: 29.9 PG (ref 27–31)
MCH RBC QN AUTO: 30.2 PG (ref 27–31)
MCH RBC QN AUTO: 30.3 PG (ref 27–31)
MCHC RBC AUTO-ENTMCNC: 33.6 G/DL (ref 32–36)
MCHC RBC AUTO-ENTMCNC: 33.9 G/DL (ref 32–36)
MCHC RBC AUTO-ENTMCNC: 34.4 G/DL (ref 32–36)
MCHC RBC AUTO-ENTMCNC: 34.9 G/DL (ref 32–36)
MCV RBC AUTO: 85 FL (ref 82–98)
MCV RBC AUTO: 87 FL (ref 82–98)
MCV RBC AUTO: 89 FL (ref 82–98)
MCV RBC AUTO: 90 FL (ref 82–98)
MONOCYTES # BLD AUTO: 0.3 K/UL (ref 0.3–1)
MONOCYTES # BLD AUTO: 0.4 K/UL (ref 0.3–1)
MONOCYTES # BLD AUTO: 0.4 K/UL (ref 0.3–1)
MONOCYTES # BLD AUTO: 0.5 K/UL (ref 0.3–1)
MONOCYTES NFR BLD: 3.5 % (ref 4–15)
MONOCYTES NFR BLD: 4.9 % (ref 4–15)
MONOCYTES NFR BLD: 6.5 % (ref 4–15)
MONOCYTES NFR BLD: 7 % (ref 4–15)
NEUTROPHILS # BLD AUTO: 4.5 K/UL (ref 1.8–7.7)
NEUTROPHILS # BLD AUTO: 5 K/UL (ref 1.8–7.7)
NEUTROPHILS # BLD AUTO: 5.4 K/UL (ref 1.8–7.7)
NEUTROPHILS # BLD AUTO: 8.6 K/UL (ref 1.8–7.7)
NEUTROPHILS NFR BLD: 70.2 % (ref 38–73)
NEUTROPHILS NFR BLD: 71.4 % (ref 38–73)
NEUTROPHILS NFR BLD: 72.9 % (ref 38–73)
NEUTROPHILS NFR BLD: 85.4 % (ref 38–73)
NRBC BLD-RTO: 0 /100 WBC
PLATELET # BLD AUTO: 204 K/UL (ref 150–450)
PLATELET # BLD AUTO: 217 K/UL (ref 150–450)
PLATELET # BLD AUTO: 218 K/UL (ref 150–450)
PLATELET # BLD AUTO: 222 K/UL (ref 150–450)
PMV BLD AUTO: 10.7 FL (ref 9.2–12.9)
PMV BLD AUTO: 11.1 FL (ref 9.2–12.9)
PMV BLD AUTO: 11.2 FL (ref 9.2–12.9)
PMV BLD AUTO: 11.3 FL (ref 9.2–12.9)
POC IONIZED CALCIUM: 0.99 MMOL/L (ref 1.06–1.42)
POC TCO2 (MEASURED): 20 MMOL/L (ref 23–29)
POTASSIUM BLD-SCNC: 4.2 MMOL/L (ref 3.5–5.1)
POTASSIUM SERPL-SCNC: 3.9 MMOL/L (ref 3.5–5.1)
PROT SERPL-MCNC: 5.8 G/DL (ref 6–8.4)
RBC # BLD AUTO: 3.28 M/UL (ref 4.6–6.2)
RBC # BLD AUTO: 3.45 M/UL (ref 4.6–6.2)
RBC # BLD AUTO: 3.66 M/UL (ref 4.6–6.2)
RBC # BLD AUTO: 3.78 M/UL (ref 4.6–6.2)
SAMPLE: ABNORMAL
SODIUM BLD-SCNC: 141 MMOL/L (ref 136–145)
SODIUM SERPL-SCNC: 141 MMOL/L (ref 136–145)
SPECIMEN OUTDATE: NORMAL
WBC # BLD AUTO: 10.05 K/UL (ref 3.9–12.7)
WBC # BLD AUTO: 6.35 K/UL (ref 3.9–12.7)
WBC # BLD AUTO: 6.88 K/UL (ref 3.9–12.7)
WBC # BLD AUTO: 7.73 K/UL (ref 3.9–12.7)

## 2023-09-21 PROCEDURE — 93010 ELECTROCARDIOGRAM REPORT: CPT | Mod: ,,, | Performed by: INTERNAL MEDICINE

## 2023-09-21 PROCEDURE — 99222 1ST HOSP IP/OBS MODERATE 55: CPT | Mod: 25,,, | Performed by: INTERNAL MEDICINE

## 2023-09-21 PROCEDURE — 63600175 PHARM REV CODE 636 W HCPCS: Performed by: EMERGENCY MEDICINE

## 2023-09-21 PROCEDURE — 63600175 PHARM REV CODE 636 W HCPCS

## 2023-09-21 PROCEDURE — 96375 TX/PRO/DX INJ NEW DRUG ADDON: CPT

## 2023-09-21 PROCEDURE — 25000003 PHARM REV CODE 250: Performed by: EMERGENCY MEDICINE

## 2023-09-21 PROCEDURE — 25000003 PHARM REV CODE 250

## 2023-09-21 PROCEDURE — 36415 COLL VENOUS BLD VENIPUNCTURE: CPT

## 2023-09-21 PROCEDURE — 80053 COMPREHEN METABOLIC PANEL: CPT | Performed by: EMERGENCY MEDICINE

## 2023-09-21 PROCEDURE — 99223 PR INITIAL HOSPITAL CARE,LEVL III: ICD-10-PCS | Mod: ,,,

## 2023-09-21 PROCEDURE — 93010 EKG 12-LEAD: ICD-10-PCS | Mod: ,,, | Performed by: INTERNAL MEDICINE

## 2023-09-21 PROCEDURE — 85025 COMPLETE CBC W/AUTO DIFF WBC: CPT

## 2023-09-21 PROCEDURE — G0378 HOSPITAL OBSERVATION PER HR: HCPCS

## 2023-09-21 PROCEDURE — 93005 ELECTROCARDIOGRAM TRACING: CPT

## 2023-09-21 PROCEDURE — 94761 N-INVAS EAR/PLS OXIMETRY MLT: CPT

## 2023-09-21 PROCEDURE — 99285 EMERGENCY DEPT VISIT HI MDM: CPT | Mod: 25

## 2023-09-21 PROCEDURE — 21400001 HC TELEMETRY ROOM

## 2023-09-21 PROCEDURE — 25500020 PHARM REV CODE 255: Performed by: STUDENT IN AN ORGANIZED HEALTH CARE EDUCATION/TRAINING PROGRAM

## 2023-09-21 PROCEDURE — 99223 1ST HOSP IP/OBS HIGH 75: CPT | Mod: ,,,

## 2023-09-21 PROCEDURE — 36415 COLL VENOUS BLD VENIPUNCTURE: CPT | Performed by: PHYSICIAN ASSISTANT

## 2023-09-21 PROCEDURE — 85025 COMPLETE CBC W/AUTO DIFF WBC: CPT | Mod: 91 | Performed by: PHYSICIAN ASSISTANT

## 2023-09-21 PROCEDURE — 99222 PR INITIAL HOSPITAL CARE,LEVL II: ICD-10-PCS | Mod: 25,,, | Performed by: INTERNAL MEDICINE

## 2023-09-21 PROCEDURE — 85025 COMPLETE CBC W/AUTO DIFF WBC: CPT | Mod: 91 | Performed by: EMERGENCY MEDICINE

## 2023-09-21 PROCEDURE — 96376 TX/PRO/DX INJ SAME DRUG ADON: CPT

## 2023-09-21 PROCEDURE — C9113 INJ PANTOPRAZOLE SODIUM, VIA: HCPCS

## 2023-09-21 PROCEDURE — 96374 THER/PROPH/DIAG INJ IV PUSH: CPT | Mod: 59

## 2023-09-21 PROCEDURE — 25000003 PHARM REV CODE 250: Performed by: STUDENT IN AN ORGANIZED HEALTH CARE EDUCATION/TRAINING PROGRAM

## 2023-09-21 PROCEDURE — 86900 BLOOD TYPING SEROLOGIC ABO: CPT | Performed by: EMERGENCY MEDICINE

## 2023-09-21 PROCEDURE — 96361 HYDRATE IV INFUSION ADD-ON: CPT

## 2023-09-21 RX ORDER — GLUCAGON 1 MG
1 KIT INJECTION
Status: DISCONTINUED | OUTPATIENT
Start: 2023-09-21 | End: 2023-09-22 | Stop reason: HOSPADM

## 2023-09-21 RX ORDER — FINASTERIDE 5 MG/1
5 TABLET, FILM COATED ORAL NIGHTLY
Status: DISCONTINUED | OUTPATIENT
Start: 2023-09-21 | End: 2023-09-22 | Stop reason: HOSPADM

## 2023-09-21 RX ORDER — ACETAMINOPHEN 325 MG/1
650 TABLET ORAL EVERY 4 HOURS PRN
Status: DISCONTINUED | OUTPATIENT
Start: 2023-09-21 | End: 2023-09-22 | Stop reason: HOSPADM

## 2023-09-21 RX ORDER — NALOXONE HCL 0.4 MG/ML
0.02 VIAL (ML) INJECTION
Status: DISCONTINUED | OUTPATIENT
Start: 2023-09-21 | End: 2023-09-22 | Stop reason: HOSPADM

## 2023-09-21 RX ORDER — DEXTROSE 40 %
16 GEL (GRAM) ORAL
Status: DISCONTINUED | OUTPATIENT
Start: 2023-09-21 | End: 2023-09-22 | Stop reason: HOSPADM

## 2023-09-21 RX ORDER — POLYETHYLENE GLYCOL 3350, SODIUM SULFATE ANHYDROUS, SODIUM BICARBONATE, SODIUM CHLORIDE, POTASSIUM CHLORIDE 236; 22.74; 6.74; 5.86; 2.97 G/4L; G/4L; G/4L; G/4L; G/4L
4000 POWDER, FOR SOLUTION ORAL ONCE
Status: COMPLETED | OUTPATIENT
Start: 2023-09-21 | End: 2023-09-21

## 2023-09-21 RX ORDER — TALC
6 POWDER (GRAM) TOPICAL NIGHTLY PRN
Status: DISCONTINUED | OUTPATIENT
Start: 2023-09-21 | End: 2023-09-22 | Stop reason: HOSPADM

## 2023-09-21 RX ORDER — IBUPROFEN 200 MG
24 TABLET ORAL
Status: DISCONTINUED | OUTPATIENT
Start: 2023-09-21 | End: 2023-09-21

## 2023-09-21 RX ORDER — IBUPROFEN 200 MG
600 TABLET ORAL DAILY PRN
COMMUNITY

## 2023-09-21 RX ORDER — CETIRIZINE HYDROCHLORIDE 10 MG/1
10 TABLET ORAL DAILY
Status: DISCONTINUED | OUTPATIENT
Start: 2023-09-21 | End: 2023-09-22 | Stop reason: HOSPADM

## 2023-09-21 RX ORDER — POLYETHYLENE GLYCOL 3350 17 G/17G
17 POWDER, FOR SOLUTION ORAL DAILY
Status: DISCONTINUED | OUTPATIENT
Start: 2023-09-21 | End: 2023-09-22 | Stop reason: HOSPADM

## 2023-09-21 RX ORDER — SODIUM CHLORIDE 0.9 % (FLUSH) 0.9 %
10 SYRINGE (ML) INJECTION EVERY 12 HOURS PRN
Status: DISCONTINUED | OUTPATIENT
Start: 2023-09-21 | End: 2023-09-22 | Stop reason: HOSPADM

## 2023-09-21 RX ORDER — DEXTROSE 40 %
24 GEL (GRAM) ORAL
Status: DISCONTINUED | OUTPATIENT
Start: 2023-09-21 | End: 2023-09-22 | Stop reason: HOSPADM

## 2023-09-21 RX ORDER — ACETAMINOPHEN 325 MG/1
650 TABLET ORAL EVERY 8 HOURS PRN
Status: DISCONTINUED | OUTPATIENT
Start: 2023-09-21 | End: 2023-09-22 | Stop reason: HOSPADM

## 2023-09-21 RX ORDER — IBUPROFEN 200 MG
16 TABLET ORAL
Status: DISCONTINUED | OUTPATIENT
Start: 2023-09-21 | End: 2023-09-21

## 2023-09-21 RX ORDER — OMEGA-3-ACID ETHYL ESTERS 1 G/1
1 CAPSULE, LIQUID FILLED ORAL DAILY
Status: DISCONTINUED | OUTPATIENT
Start: 2023-09-21 | End: 2023-09-22 | Stop reason: HOSPADM

## 2023-09-21 RX ORDER — ONDANSETRON 2 MG/ML
4 INJECTION INTRAMUSCULAR; INTRAVENOUS
Status: COMPLETED | OUTPATIENT
Start: 2023-09-21 | End: 2023-09-21

## 2023-09-21 RX ORDER — PANTOPRAZOLE SODIUM 40 MG/10ML
40 INJECTION, POWDER, LYOPHILIZED, FOR SOLUTION INTRAVENOUS 2 TIMES DAILY
Status: DISCONTINUED | OUTPATIENT
Start: 2023-09-21 | End: 2023-09-22 | Stop reason: HOSPADM

## 2023-09-21 RX ORDER — ONDANSETRON 8 MG/1
8 TABLET, ORALLY DISINTEGRATING ORAL EVERY 8 HOURS PRN
Status: DISCONTINUED | OUTPATIENT
Start: 2023-09-21 | End: 2023-09-22 | Stop reason: HOSPADM

## 2023-09-21 RX ORDER — ATORVASTATIN CALCIUM 40 MG/1
40 TABLET, FILM COATED ORAL NIGHTLY
Status: DISCONTINUED | OUTPATIENT
Start: 2023-09-21 | End: 2023-09-22 | Stop reason: HOSPADM

## 2023-09-21 RX ORDER — PAROXETINE 10 MG/1
10 TABLET, FILM COATED ORAL NIGHTLY
Status: DISCONTINUED | OUTPATIENT
Start: 2023-09-21 | End: 2023-09-22 | Stop reason: HOSPADM

## 2023-09-21 RX ORDER — FLUTICASONE PROPIONATE 50 MCG
1 SPRAY, SUSPENSION (ML) NASAL DAILY PRN
COMMUNITY

## 2023-09-21 RX ORDER — MAG HYDROX/ALUMINUM HYD/SIMETH 200-200-20
30 SUSPENSION, ORAL (FINAL DOSE FORM) ORAL 4 TIMES DAILY PRN
Status: DISCONTINUED | OUTPATIENT
Start: 2023-09-21 | End: 2023-09-22 | Stop reason: HOSPADM

## 2023-09-21 RX ADMIN — ATORVASTATIN CALCIUM 40 MG: 40 TABLET, FILM COATED ORAL at 08:09

## 2023-09-21 RX ADMIN — ONDANSETRON 4 MG: 2 INJECTION INTRAMUSCULAR; INTRAVENOUS at 11:09

## 2023-09-21 RX ADMIN — PANTOPRAZOLE SODIUM 40 MG: 40 INJECTION, POWDER, FOR SOLUTION INTRAVENOUS at 12:09

## 2023-09-21 RX ADMIN — IOHEXOL 100 ML: 350 INJECTION, SOLUTION INTRAVENOUS at 12:09

## 2023-09-21 RX ADMIN — OMEGA-3-ACID ETHYL ESTERS 1 G: 1 CAPSULE, LIQUID FILLED ORAL at 01:09

## 2023-09-21 RX ADMIN — PAROXETINE HYDROCHLORIDE 10 MG: 10 TABLET, FILM COATED ORAL at 08:09

## 2023-09-21 RX ADMIN — PANTOPRAZOLE SODIUM 40 MG: 40 INJECTION, POWDER, FOR SOLUTION INTRAVENOUS at 09:09

## 2023-09-21 RX ADMIN — SODIUM CHLORIDE 1000 ML: 9 INJECTION, SOLUTION INTRAVENOUS at 10:09

## 2023-09-21 RX ADMIN — POLYETHYLENE GLYCOL 3350, SODIUM SULFATE ANHYDROUS, SODIUM BICARBONATE, SODIUM CHLORIDE, POTASSIUM CHLORIDE 4000 ML: 236; 22.74; 6.74; 5.86; 2.97 POWDER, FOR SOLUTION ORAL at 06:09

## 2023-09-21 RX ADMIN — FINASTERIDE 5 MG: 5 TABLET, FILM COATED ORAL at 08:09

## 2023-09-21 NOTE — ED NOTES
Pt presents to ED via personal transport w/ family member w/ c/o bright red blood per rectum. States he was just seen recently in the ED for the same symptoms. States he has had several BRB per rectum several times today. Pt was seen by GI and colonoscopy is scheduled for tomorrow. Pt takes 81 ASA daily. Denies vomiting or hematemesis.    Patient identifiers for Pietro Dietz 63 y.o. male checked and correct.  Chief Complaint   Patient presents with    Rectal Bleeding     Pt reports passing blood this am, feels weak.  Was seen on Sunday for GI bleed-has colonoscopy scheduled for tomorrow.      Past Medical History:   Diagnosis Date    Fatty liver     GERD (gastroesophageal reflux disease)     Hyperlipidemia     Sleep apnea

## 2023-09-21 NOTE — PHARMACY MED REC
"Admission Medication History     The home medication history was taken by Nusrat Carr.    You may go to "Admission" then "Reconcile Home Medications" tabs to review and/or act upon these items.     The home medication list has been updated by the Pharmacy department.   Please read ALL comments highlighted in yellow.   Please address this information as you see fit.    Feel free to contact us if you have any questions or require assistance.        Medications listed below were obtained from: Patient/family  Current Outpatient Medications on File Prior to Encounter   Medication Sig    aspirin (ECOTRIN) 81 MG EC tablet Take 81 mg by mouth once daily.    atorvastatin (LIPITOR) 40 MG tablet TAKE 1 TABLET EVERY EVENING    fexofenadine (ALLEGRA) 180 MG tablet Take 180 mg by mouth daily as needed (allergies).    finasteride (PROSCAR) 5 mg tablet TAKE 1 TABLET EVERY EVENING    fluticasone propionate (FLONASE) 50 mcg/actuation nasal spray 1 spray by Each Nostril route daily as needed for Allergies.    ibuprofen (ADVIL,MOTRIN) 200 MG tablet Take 600 mg by mouth daily as needed for Pain.    omeprazole (PRILOSEC) 20 MG capsule Take 1 capsule (20 mg total) by mouth every evening.    paroxetine (PAXIL) 10 MG tablet Take 1 tablet (10 mg total) by mouth every evening.    VASCEPA 1 gram Cap Take 1 capsule (1,000 mg total) by mouth every evening.                 Nusrat Carr  EXT [id number]                  .          "

## 2023-09-21 NOTE — ASSESSMENT & PLAN NOTE
Patient's anemia is currently controlled. S/p 0 units of PRBCs. Etiology likely d/t acute blood loss  Current CBC reviewed-   Lab Results   Component Value Date    HGB 10.3 (L) 09/21/2023    HCT 29.9 (L) 09/21/2023   Monitor serial CBC and transfuse if patient becomes hemodynamically unstable, symptomatic or H/H drops below 7/21.

## 2023-09-21 NOTE — SUBJECTIVE & OBJECTIVE
"Past Medical History:   Diagnosis Date    Fatty liver     GERD (gastroesophageal reflux disease)     Hyperlipidemia     Sleep apnea        Past Surgical History:   Procedure Laterality Date    APPENDECTOMY      5 years old    COSMETIC SURGERY      EYE SURGERY      Lasik    OPEN REDUCTION AND INTERNAL FIXATION (ORIF) OF FRACTURE OF CALCANEUS Right 11/05/2021    Procedure: ORIF, FRACTURE, CALCANEUS;  Surgeon: Nichole Ortiz MD;  Location: [facility] OR [address] [place] [address];  Service: Orthopedics;  Laterality: Right;    VITRECTOMY         Review of patient's allergies indicates:   Allergen Reactions    Erythromycin Other (See Comments)     Causes "stomach burning"       No current facility-administered medications on file prior to encounter.     Current Outpatient Medications on File Prior to Encounter   Medication Sig    aspirin (ECOTRIN) 81 MG EC tablet Take 81 mg by mouth once daily.    atorvastatin (LIPITOR) 40 MG tablet TAKE 1 TABLET EVERY EVENING    fexofenadine (ALLEGRA) 180 MG tablet Take 180 mg by mouth daily as needed (allergies).    finasteride (PROSCAR) 5 mg tablet TAKE 1 TABLET EVERY EVENING    fluticasone propionate (FLONASE) 50 mcg/actuation nasal spray 1 spray by Each Nostril route daily as needed for Allergies.    ibuprofen (ADVIL,MOTRIN) 200 MG tablet Take 600 mg by mouth daily as needed for Pain.    omeprazole (PRILOSEC) 20 MG capsule Take 1 capsule (20 mg total) by mouth every evening.    paroxetine (PAXIL) 10 MG tablet Take 1 tablet (10 mg total) by mouth every evening.    VASCEPA 1 gram Cap Take 1 capsule (1,000 mg total) by mouth every evening.     Family History       Problem Relation (Age of Onset)    Heart disease Father, Brother (66)    Hypertension Mother    Stroke Father (60)          Tobacco Use    Smoking status: Never    Smokeless tobacco: Never   Substance and Sexual Activity    Alcohol use: Yes     Alcohol/week: 3.0 standard drinks of alcohol     Types: 3 Shots of liquor per week    Drug use: No    " Sexual activity: Not Currently     Partners: Female     Review of Systems   Constitutional:  Negative for activity change, chills and fever.   HENT:  Negative for trouble swallowing.    Eyes:  Negative for photophobia and visual disturbance.   Respiratory:  Negative for chest tightness, shortness of breath and wheezing.    Cardiovascular:  Negative for chest pain, palpitations and leg swelling.   Gastrointestinal:  Positive for blood in stool. Negative for abdominal pain, constipation, diarrhea, nausea and vomiting.   Genitourinary:  Negative for dysuria, frequency, hematuria and urgency.   Musculoskeletal:  Negative for arthralgias, back pain and gait problem.   Skin:  Negative for color change and rash.   Neurological:  Negative for dizziness, syncope, weakness, light-headedness, numbness and headaches.   Psychiatric/Behavioral:  Negative for agitation and confusion. The patient is not nervous/anxious.      Objective:     Vital Signs (Most Recent):  Temp: 97 °F (36.1 °C) (09/21/23 1549)  Pulse: 62 (09/21/23 1549)  Resp: 20 (09/21/23 1549)  BP: 111/66 (09/21/23 1549)  SpO2: 97 % (09/21/23 1549) Vital Signs (24h Range):  Temp:  [97 °F (36.1 °C)-97.7 °F (36.5 °C)] 97 °F (36.1 °C)  Pulse:  [54-83] 62  Resp:  [14-20] 20  SpO2:  [94 %-100 %] 97 %  BP: ()/(46-75) 111/66     Weight: 93 kg (205 lb)  Body mass index is 33.09 kg/m².     Physical Exam  Vitals and nursing note reviewed.   Constitutional:       General: He is not in acute distress.     Appearance: He is well-developed.   HENT:      Head: Normocephalic and atraumatic.   Eyes:      Extraocular Movements: Extraocular movements intact.   Cardiovascular:      Rate and Rhythm: Normal rate and regular rhythm.      Heart sounds: Normal heart sounds.   Pulmonary:      Effort: Pulmonary effort is normal. No respiratory distress.   Abdominal:      General: There is no distension.   Musculoskeletal:         General: No tenderness. Normal range of motion.   Skin:      General: Skin is warm and dry.      Findings: No rash.   Neurological:      Mental Status: He is alert and oriented to person, place, and time.   Psychiatric:         Behavior: Behavior normal.         Thought Content: Thought content normal.         Judgment: Judgment normal.        Significant Labs: All pertinent labs within the past 24 hours have been reviewed.  CBC:   Recent Labs   Lab 09/21/23  1038 09/21/23  1059 09/21/23  1337   WBC 6.35  --  10.05   HGB 11.4*  --  10.3*   HCT 33.9* 31* 29.9*     --  204     CMP:   Recent Labs   Lab 09/21/23  1038      K 3.9      CO2 20*      BUN 21   CREATININE 1.0   CALCIUM 8.5*   PROT 5.8*   ALBUMIN 3.5   BILITOT 0.9   ALKPHOS 84   AST 23   ALT 30   ANIONGAP 11       Significant Imaging: I have reviewed all pertinent imaging results/findings within the past 24 hours.  Imaging Results              CTA Acute GI Pace, Abdomen and Pelvis (Final result)  Result time 09/21/23 13:07:22      Final result by Uday Cook MD (09/21/23 13:07:22)                   Impression:      1. No intraluminal intestinal, intraperitoneal, or retroperitoneal hemorrhage.  2. No acute CTA abnormality.  3. Details of chronic and incidental findings, as provided in the body of the report.      Electronically signed by: Uday Cook  Date:    09/21/2023  Time:    13:07               Narrative:    EXAMINATION:  CTA ACUTE GI BLEED, ABDOMEN AND PELVIS    CLINICAL HISTORY:  GI bleed;    TECHNIQUE:  Contiguous 2.5-mm axial images were obtained through the abdomen and pelvis following the administration of 100 cc of intravenous Omnipaque 350.  Sagittal and coronal reformats and maximum intensity projections were also submitted.    COMPARISON:  None    FINDINGS:  BOWEL AND PERITONEUM    Bowel: No evidence of bowel obstruction.  Colonic diverticulosis.  Appendix not confidently identified.  No definite focal pericecal inflammation.    Intraluminal intestinal hemorrhage:  None.    Free air or fluid: None.    VASCULATURE    Visceral arteries:    The celiac, superior mesenteric, bilateral renal, and inferior mesenteric arteries are patent.    Abdominal aorta and iliac arteries: No high-grade stenosis or aneurysmal dilatation.  Mild-to-moderate atherosclerosis.    Mesenteric and portal veins: Normally patent.    Inferior vena cava: Normally patent.    ABDOMEN/PELVIS    Lower chest: Atelectasis at the lung bases.    Liver: Normal contour.  Suspect focal fat deposition marginating the falciform ligament.    Gallbladder/bile ducts: Unremarkable. No intra or extrahepatic biliary ductal dilatation    Pancreas: Unremarkable.    Spleen: Unremarkable.    Adrenals: Unremarkable.    Kidneys: Unremarkable.    Lymph nodes: No abdominal or pelvic lymphadenopathy.    Pelvis: Prostate again appears enlarged.    Urinary bladder: Unremarkable.    Body wall: Unremarkable.    Bones: No acute abnormality.  Degenerative findings in the spine, with moderate to advanced facet arthropathy bilaterally at L4-5 and L5-S1.

## 2023-09-21 NOTE — CONSULTS
[facility]  Gastroenterology  Consult Note    Patient Name: Pietro Dietz  MRN: [id number]  Admission Date: 9/21/2023  Hospital Length of Stay: 0 days  Code Status: Full Code   Attending Provider: Kwesi Kilpatrick MD   Consulting Provider: Garcia Regalado MD  Primary Care Physician: Nick Whittaker MD  Principal Problem:Acute GI bleeding    Inpatient consult to Gastroenterology  Consult performed by: Garcia Regalado MD  Consult ordered by: Jd Lane MD        Subjective:     HPI: Pietro Diezt is a 63 y.o. male with history of  GERD, HLD, anxiety who presents for BRBPR. Recently admitted overnight for same issue but Hgb was wnl and discharged the next day when stable, planned for outpatient colonoscopy which is scheduled for tomorrow 9/22.     Yesterday noticed bright red blood on toilet paper. This morning had 4-5 episodes of hematochezia. Denies abd pain. Hgb now 11 from 15 at recent admission. Hypotensive in ED but responded well to IVFs. CTA negative for active extravasation.     Last colonoscopy in 2018 showed diverticulosis and small internal hemorrhoids.      Objective:     Vitals:    09/21/23 1549   BP: 111/66   Pulse: 62   Resp: 20   Temp: 97 °F (36.1 °C)         Constitutional:  not in acute distress and well developed  HENT: Head: Normal, normocephalic  Eyes: conjunctiva clear and sclera nonicteric  GI: soft, non-tender, without masses or organomegaly  Musculoskeletal: no muscular tenderness noted  Skin: normal color  Neurological: alert, oriented x3  Psychiatric: mood and affect are within normal limits, pt is a good historian; no memory problems were noted    Significant Labs:  Reviewed the following pertinent laboratory tests: CBC, INR. Notable for Hgb 10.3 (14 on 9/17)  MCV 87  Plts 204  INR 1.0      Significant Imaging:  Imaging reviewed: CTA. Interpretation: negative for active extravasation      Assessment/Plan:     Pietro Dietz is a 63 y.o. male with  history of GERD, HLD, anxiety who presents for hematochezia. Hgb down from baseline over the past few days and hypotensive at presentation. Suspect diverticular bleed. Colonoscopy tomorrow.    Problem List:  Hematochezia    Recommendations:  - Plan for colonoscopy 9/22  - Clear liquid diet and NPO at midnight  - Golytely prep to start around 1800, to be completed within 4 hours if possible (ordered)  - Monitor Hgb q12 hrs  - Transfuse to keep Hgb >7, plts >50  - Hold anticoagulants if safe to do so per primary team  - If becomes hemodynamically unstable with associated large volume hematochezia, recommend STAT CTA and IR embolization if positive      Thank you for involving us in the care of Pietro Dietz. Please call with any additional questions, concerns or changes in the patient's clinical status. We will continue to follow.     Garcia Regalado MD  Gastroenterology Fellow PGY V  [facility]

## 2023-09-21 NOTE — H&P
[facility] [address] [place] [address] Medicine  History & Physical    Patient Name: Pietro Dietz  MRN: [id number]  Patient Class: OP- Observation  Admission Date: 9/21/2023  Attending Physician: Kwesi Kilpatrick MD   Primary Care Provider: Nick Whittaker MD         Patient information was obtained from patient, past medical records and ER records.     Subjective:     Principal Problem:Acute GI bleeding    Chief Complaint:   Chief Complaint   Patient presents with    Rectal Bleeding     Pt reports passing blood this am, feels weak.  Was seen on Sunday for GI bleed-has colonoscopy scheduled for tomorrow.         HPI: Pietro Dietz is a 63 y.o. male with diverticulosis, GERD, hypertension, hyperlipidemia and BPH being admitted to hospital medicine for management of acute lower GI bleed. Patient was discharged from hospital medicine on 09/18 for lower GI bleed with plan for outpatient colonoscopy on 09/22. Patient reports sudden recurrence of bright red blood bowel movement this morning. No further episodes of hematuria today. Denies any associated abdominal pain, lightheadedness, dizziness, weakness, shortness of breath, chest pains, fever, chills, nausea or vomiting.       In ED: hypotensive to 70/46, improved to 135/72 with 1 L NS bolus. Vitals otherwise stable. CBC with hgb of 11.4. CMP grossly unremarkable. CTA abdomen pending. Admitted to the care of medicine for further management.       Past Medical History:   Diagnosis Date    Fatty liver     GERD (gastroesophageal reflux disease)     Hyperlipidemia     Sleep apnea        Past Surgical History:   Procedure Laterality Date    APPENDECTOMY      5 years old    COSMETIC SURGERY      EYE SURGERY      Lasik    OPEN REDUCTION AND INTERNAL FIXATION (ORIF) OF FRACTURE OF CALCANEUS Right 11/05/2021    Procedure: ORIF, FRACTURE, CALCANEUS;  Surgeon: Nichole Ortiz MD;  Location: [facility] OR [address] [place] [address];  Service: Orthopedics;  Laterality: Right;    VITRECTOMY    "      Review of patient's allergies indicates:   Allergen Reactions    Erythromycin Other (See Comments)     Causes "stomach burning"       No current facility-administered medications on file prior to encounter.     Current Outpatient Medications on File Prior to Encounter   Medication Sig    aspirin (ECOTRIN) 81 MG EC tablet Take 81 mg by mouth once daily.    atorvastatin (LIPITOR) 40 MG tablet TAKE 1 TABLET EVERY EVENING    fexofenadine (ALLEGRA) 180 MG tablet Take 180 mg by mouth daily as needed (allergies).    finasteride (PROSCAR) 5 mg tablet TAKE 1 TABLET EVERY EVENING    fluticasone propionate (FLONASE) 50 mcg/actuation nasal spray 1 spray by Each Nostril route daily as needed for Allergies.    ibuprofen (ADVIL,MOTRIN) 200 MG tablet Take 600 mg by mouth daily as needed for Pain.    omeprazole (PRILOSEC) 20 MG capsule Take 1 capsule (20 mg total) by mouth every evening.    paroxetine (PAXIL) 10 MG tablet Take 1 tablet (10 mg total) by mouth every evening.    VASCEPA 1 gram Cap Take 1 capsule (1,000 mg total) by mouth every evening.     Family History       Problem Relation (Age of Onset)    Heart disease Father, Brother (66)    Hypertension Mother    Stroke Father (60)          Tobacco Use    Smoking status: Never    Smokeless tobacco: Never   Substance and Sexual Activity    Alcohol use: Yes     Alcohol/week: 3.0 standard drinks of alcohol     Types: 3 Shots of liquor per week    Drug use: No    Sexual activity: Not Currently     Partners: Female     Review of Systems   Constitutional:  Negative for activity change, chills and fever.   HENT:  Negative for trouble swallowing.    Eyes:  Negative for photophobia and visual disturbance.   Respiratory:  Negative for chest tightness, shortness of breath and wheezing.    Cardiovascular:  Negative for chest pain, palpitations and leg swelling.   Gastrointestinal:  Positive for blood in stool. Negative for abdominal pain, constipation, diarrhea, nausea " and vomiting.   Genitourinary:  Negative for dysuria, frequency, hematuria and urgency.   Musculoskeletal:  Negative for arthralgias, back pain and gait problem.   Skin:  Negative for color change and rash.   Neurological:  Negative for dizziness, syncope, weakness, light-headedness, numbness and headaches.   Psychiatric/Behavioral:  Negative for agitation and confusion. The patient is not nervous/anxious.      Objective:     Vital Signs (Most Recent):  Temp: 97 °F (36.1 °C) (09/21/23 1549)  Pulse: 62 (09/21/23 1549)  Resp: 20 (09/21/23 1549)  BP: 111/66 (09/21/23 1549)  SpO2: 97 % (09/21/23 1549) Vital Signs (24h Range):  Temp:  [97 °F (36.1 °C)-97.7 °F (36.5 °C)] 97 °F (36.1 °C)  Pulse:  [54-83] 62  Resp:  [14-20] 20  SpO2:  [94 %-100 %] 97 %  BP: ()/(46-75) 111/66     Weight: 93 kg (205 lb)  Body mass index is 33.09 kg/m².     Physical Exam  Vitals and nursing note reviewed.   Constitutional:       General: He is not in acute distress.     Appearance: He is well-developed.   HENT:      Head: Normocephalic and atraumatic.   Eyes:      Extraocular Movements: Extraocular movements intact.   Cardiovascular:      Rate and Rhythm: Normal rate and regular rhythm.      Heart sounds: Normal heart sounds.   Pulmonary:      Effort: Pulmonary effort is normal. No respiratory distress.   Abdominal:      General: There is no distension.   Musculoskeletal:         General: No tenderness. Normal range of motion.   Skin:     General: Skin is warm and dry.      Findings: No rash.   Neurological:      Mental Status: He is alert and oriented to person, place, and time.   Psychiatric:         Behavior: Behavior normal.         Thought Content: Thought content normal.         Judgment: Judgment normal.        Significant Labs: All pertinent labs within the past 24 hours have been reviewed.  CBC:   Recent Labs   Lab 09/21/23  1038 09/21/23  1059 09/21/23  1337   WBC 6.35  --  10.05   HGB 11.4*  --  10.3*   HCT 33.9* 31* 29.9*      --  204     CMP:   Recent Labs   Lab 09/21/23  1038      K 3.9      CO2 20*      BUN 21   CREATININE 1.0   CALCIUM 8.5*   PROT 5.8*   ALBUMIN 3.5   BILITOT 0.9   ALKPHOS 84   AST 23   ALT 30   ANIONGAP 11       Significant Imaging: I have reviewed all pertinent imaging results/findings within the past 24 hours.  Imaging Results              CTA Acute GI Shiro, Abdomen and Pelvis (Final result)  Result time 09/21/23 13:07:22      Final result by Uday Cook MD (09/21/23 13:07:22)                   Impression:      1. No intraluminal intestinal, intraperitoneal, or retroperitoneal hemorrhage.  2. No acute CTA abnormality.  3. Details of chronic and incidental findings, as provided in the body of the report.      Electronically signed by: Uday Cook  Date:    09/21/2023  Time:    13:07               Narrative:    EXAMINATION:  CTA ACUTE GI BLEED, ABDOMEN AND PELVIS    CLINICAL HISTORY:  GI bleed;    TECHNIQUE:  Contiguous 2.5-mm axial images were obtained through the abdomen and pelvis following the administration of 100 cc of intravenous Omnipaque 350.  Sagittal and coronal reformats and maximum intensity projections were also submitted.    COMPARISON:  None    FINDINGS:  BOWEL AND PERITONEUM    Bowel: No evidence of bowel obstruction.  Colonic diverticulosis.  Appendix not confidently identified.  No definite focal pericecal inflammation.    Intraluminal intestinal hemorrhage: None.    Free air or fluid: None.    VASCULATURE    Visceral arteries:    The celiac, superior mesenteric, bilateral renal, and inferior mesenteric arteries are patent.    Abdominal aorta and iliac arteries: No high-grade stenosis or aneurysmal dilatation.  Mild-to-moderate atherosclerosis.    Mesenteric and portal veins: Normally patent.    Inferior vena cava: Normally patent.    ABDOMEN/PELVIS    Lower chest: Atelectasis at the lung bases.    Liver: Normal contour.  Suspect focal fat deposition  marginating the falciform ligament.    Gallbladder/bile ducts: Unremarkable. No intra or extrahepatic biliary ductal dilatation    Pancreas: Unremarkable.    Spleen: Unremarkable.    Adrenals: Unremarkable.    Kidneys: Unremarkable.    Lymph nodes: No abdominal or pelvic lymphadenopathy.    Pelvis: Prostate again appears enlarged.    Urinary bladder: Unremarkable.    Body wall: Unremarkable.    Bones: No acute abnormality.  Degenerative findings in the spine, with moderate to advanced facet arthropathy bilaterally at L4-5 and L5-S1.                                    Assessment/Plan:     * Acute GI bleeding  Diverticulosis of colon  63 y.o. with previous history of GI bleed who reports hematochezia onset this morning   - HDS   - orthostatic vitals Q shift   - previous imaging: colonoscopy, EGD  Lab Results   Component Value Date    WBC 10.05 09/21/2023    HGB 10.3 (L) 09/21/2023    HCT 29.9 (L) 09/21/2023    MCV 87 09/21/2023     09/21/2023    - serial H/Hs   - transfuse for Hgb <7   - type and screen ordered    - transfusion consent obtained  - Maintain IV access with 2 large bore IVs  - Intravascular resuscitation/support with IVFs   - Protonix 40mg IV BID  - Discontinue all ASA, NSAIDs and Heparin products  - will correct any coagulopathy with platelets and FFP to a goal of platelets >50K and INR <2.0  - GI consulted, pending recs  - NPO at midnight     BPH (benign prostatic hyperplasia)  - chronic  - continue home finasteride     Anxiety  - chronic  - continue home paxil     GERD (gastroesophageal reflux disease)  - chronic  - substitute omeprazole for pantoprazole     Hyperlipidemia  Lab Results   Component Value Date    LDLCALC 105.6 06/09/2023   - continue home statin       VTE Risk Mitigation (From admission, onward)         Ordered     IP VTE HIGH RISK PATIENT  Once         09/21/23 1236     Place sequential compression device  Until discontinued         09/21/23 1236     Reason for No  Pharmacological VTE Prophylaxis  Once        Question:  Reasons:  Answer:  Active Bleeding    09/21/23 1236                 On 09/21/2023, patient should be placed in hospital observation services under my care in collaboration with Kwesi Kilpatrick MD.      Yady Farnsworth PA-C  Department of Hospital Medicine  [facility] - Observation 11

## 2023-09-21 NOTE — ANESTHESIA PREPROCEDURE EVALUATION
[facility]  Anesthesia Pre-Operative Evaluation         Patient Name: Pietro Dietz  YOB: 1960  MRN: [id number]    SUBJECTIVE:     Pre-operative evaluation for Procedure(s) (LRB):  COLONOSCOPY (N/A)     09/21/2023    Pietro Dietz is a 63 y.o. male w/ a significant PMHx of GERD, HLD, diverticulosis, internal hemorrhoids, and anxiety who is     NOTE NOT COMPLETE    Patient now presents for the above procedure(s).      LDA: None documented.       Prev airway:   Intubation:     Induction:  Intravenous    Intubated:  Postinduction    Mask Ventilation:  Easy mask    Attempts:  1    Attempted By:  Student    Method of Intubation:  Video laryngoscopy    Blade:  Scott 3    Laryngeal View Grade: Grade I - full view of cords      Difficult Airway Encountered?: No      Complications:  None    Airway Device:  Oral endotracheal tube    Airway Device Size:  7.5    Style/Cuff Inflation:  Cuffed (inflated to minimal occlusive pressure)    Inflation Amount (mL):  8    Tube secured:  22    Secured at:  The teeth    Placement Verified By:  Capnometry    Complicating Factors:  None    Findings Post-Intubation:  BS equal bilateral and atraumatic/condition of teeth unchanged    Drips: None documented.      Patient Active Problem List   Diagnosis    Closed displaced fracture of tuberosity of right calcaneus    Closed displaced fracture of body of right calcaneus with routine healing    Decreased ROM of ankle    Decreased strength    Hyperlipidemia    Sleep apnea    GERD (gastroesophageal reflux disease)    Family history of early CAD    Fatty liver    Anxiety    BRBPR (bright red blood per rectum)    BPH (benign prostatic hyperplasia)    Diverticulosis    Acute GI bleeding    Diverticulosis of colon    Acute blood loss anemia       Review of patient's allergies indicates:   Allergen  "Reactions    Erythromycin Other (See Comments)     Causes "stomach burning"       Current Inpatient Medications:      Current Facility-Administered Medications on File Prior to Visit   Medication Dose Route Frequency Provider Last Rate Last Admin    acetaminophen tablet 650 mg  650 mg Oral Q8H PRN Yady Farnsworth PA-C        acetaminophen tablet 650 mg  650 mg Oral Q4H PRN Yady Farnsworth PA-C        aluminum-magnesium hydroxide-simethicone 200-200-20 mg/5 mL suspension 30 mL  30 mL Oral QID PRN Yady Farnsworth PA-C        atorvastatin tablet 40 mg  40 mg Oral QHS Yady Farnsworth PA-C        cetirizine tablet 10 mg  10 mg Oral Daily Yady Farnsworth PA-C        dextrose 10% bolus 125 mL 125 mL  12.5 g Intravenous PRN Yady Farnsworth PA-C        dextrose 10% bolus 250 mL 250 mL  25 g Intravenous PRN Yady Farnsworth PA-C        dextrose 40 % gel 16,000 mg  16 g Oral PRN Kwesi Kilpatrick MD        dextrose 40 % gel 24,000 mg  24 g Oral PRN Kwesi Kilpatrick MD        finasteride tablet 5 mg  5 mg Oral QHS Yady Farnsworth PA-C        glucagon (human recombinant) injection 1 mg  1 mg Intramuscular PRN Yady Farnsworth PA-C        melatonin tablet 6 mg  6 mg Oral Nightly PRN Yady Farnsworth PA-C        naloxone 0.4 mg/mL injection 0.02 mg  0.02 mg Intravenous PRN Yady Farnsworth PA-C        omega-3 acid ethyl esters capsule 1 g  1 g Oral Daily Yady Farnsworth PA-C   1 g at 09/21/23 1345    ondansetron disintegrating tablet 8 mg  8 mg Oral Q8H PRN Yady Farnsworth PA-C        pantoprazole injection 40 mg  40 mg Intravenous BID Yady Farnsworth PA-C   40 mg at 09/21/23 1245    paroxetine tablet 10 mg  10 mg Oral QHS Yady Farnwsorth PA-C        polyethylene glycol (GoLYTELY) solution  4,000 mL Oral Once Garcia Regalado MD        polyethylene glycol packet 17 g  17 g Oral Daily Yady Farnsworth PA-C        sodium chloride 0.9% flush 10 mL  10 " mL Intravenous Q12H PRN Yady Farnsworth PA-C         Current Outpatient Medications on File Prior to Visit   Medication Sig Dispense Refill    aspirin (ECOTRIN) 81 MG EC tablet Take 81 mg by mouth once daily.      atorvastatin (LIPITOR) 40 MG tablet TAKE 1 TABLET EVERY EVENING 90 tablet 3    fexofenadine (ALLEGRA) 180 MG tablet Take 180 mg by mouth daily as needed (allergies).      finasteride (PROSCAR) 5 mg tablet TAKE 1 TABLET EVERY EVENING 90 tablet 3    fluticasone propionate (FLONASE) 50 mcg/actuation nasal spray 1 spray by Each Nostril route daily as needed for Allergies.      ibuprofen (ADVIL,MOTRIN) 200 MG tablet Take 600 mg by mouth daily as needed for Pain.      omeprazole (PRILOSEC) 20 MG capsule Take 1 capsule (20 mg total) by mouth every evening. 90 capsule 3    paroxetine (PAXIL) 10 MG tablet Take 1 tablet (10 mg total) by mouth every evening. 90 tablet 3    VASCEPA 1 gram Cap Take 1 capsule (1,000 mg total) by mouth every evening. 90 capsule 3       Past Surgical History:   Procedure Laterality Date    APPENDECTOMY      5 years old    COSMETIC SURGERY      EYE SURGERY      Lasik    OPEN REDUCTION AND INTERNAL FIXATION (ORIF) OF FRACTURE OF CALCANEUS Right 11/05/2021    Procedure: ORIF, FRACTURE, CALCANEUS;  Surgeon: Nichole Ortiz MD;  Location: [address];  Service: Orthopedics;  Laterality: Right;    VITRECTOMY         Social History     Socioeconomic History    Marital status:    Tobacco Use    Smoking status: Never    Smokeless tobacco: Never   Substance and Sexual Activity    Alcohol use: Yes     Alcohol/week: 3.0 standard drinks of alcohol     Types: 3 Shots of liquor per week    Drug use: No    Sexual activity: Not Currently     Partners: Female       OBJECTIVE:     Vital Signs Range (Last 24H):  Temp:  [36.1 °C (97 °F)-36.5 °C (97.7 °F)]   Pulse:  [54-83]   Resp:  [14-20]   BP: ()/(46-75)   SpO2:  [94 %-100 %]       Significant Labs:  Lab Results    Component Value Date    WBC 10.05 09/21/2023    HGB 10.3 (L) 09/21/2023    HCT 29.9 (L) 09/21/2023     09/21/2023    CHOL 168 06/09/2023    TRIG 77 06/09/2023    HDL 47 06/09/2023    ALT 30 09/21/2023    AST 23 09/21/2023     09/21/2023    K 3.9 09/21/2023     09/21/2023    CREATININE 1.0 09/21/2023    BUN 21 09/21/2023    CO2 20 (L) 09/21/2023    TSH 1.427 05/26/2023    PSA 2.7 05/26/2023    INR 1.0 09/17/2023    HGBA1C 5.2 05/26/2023       Diagnostic Studies: No relevant studies.    EKG:   Results for orders placed or performed during the hospital encounter of 09/21/23   EKG 12-lead    Collection Time: 09/21/23 10:25 AM    Narrative    Test Reason : K92.2,    Vent. Rate : 064 BPM     Atrial Rate : 064 BPM     P-R Int : 154 ms          QRS Dur : 098 ms      QT Int : 396 ms       P-R-T Axes : 035 045 022 degrees     QTc Int : 408 ms    Normal sinus rhythm  Normal ECG  When compared with ECG of 17-SEP-2023 06:45,  No significant change was found  Confirmed by Fabio Rain MD ([id number]) on 9/21/2023 4:33:14 PM    Referred By: LEON DIAZ           Confirmed By:Fabio Rain MD       2D ECHO:  TTE:  No results found for this or any previous visit.    ZINA:  No results found for this or any previous visit.    ASSESSMENT/PLAN:           Pre-op Assessment          Review of Systems    Physical Exam  General: Well nourished    Airway:  Mallampati: III / II  Mouth Opening: Normal  TM Distance: Normal  Tongue: Normal  Neck ROM: Normal ROM    Chest/Lungs:  Normal Respiratory Rate    Heart:  Rate: Normal        Anesthesia Plan  Type of Anesthesia, risks & benefits discussed:    Anesthesia Type: Gen Natural Airway  Intra-op Monitoring Plan: Standard ASA Monitors  Post Op Pain Control Plan: multimodal analgesia  Induction:  IV  Informed Consent: Informed consent signed with the Patient and all parties understand the risks and agree with anesthesia plan.  All questions answered.   ASA Score: 3  Day of  Surgery Review of History & Physical: H&P Update referred to the surgeon/provider.  Anesthesia Plan Notes: NPO confirmed.   No history of anesthesia problems.     Ready For Surgery From Anesthesia Perspective.     .

## 2023-09-21 NOTE — HPI
Pietro Dietz is a 63 y.o. male with diverticulosis, GERD, hypertension, hyperlipidemia and BPH being admitted to hospital medicine for management of acute lower GI bleed. Patient was discharged from hospital medicine on 09/18 for lower GI bleed with plan for outpatient colonoscopy on 09/22. Patient reports sudden recurrence of bright red blood bowel movement this morning. No further episodes of hematuria today. Denies any associated abdominal pain, lightheadedness, dizziness, weakness, shortness of breath, chest pains, fever, chills, nausea or vomiting.       In ED: hypotensive to 70/46, improved to 135/72 with 1 L NS bolus. Vitals otherwise stable. CBC with hgb of 11.4. CMP grossly unremarkable. CTA abdomen pending. Admitted to the care of medicine for further management.

## 2023-09-21 NOTE — PLAN OF CARE
Problem: Adult Inpatient Plan of Care  Goal: Plan of Care Review  Outcome: Ongoing, Progressing  Goal: Patient-Specific Goal (Individualized)  Outcome: Ongoing, Progressing  Goal: Absence of Hospital-Acquired Illness or Injury  Outcome: Ongoing, Progressing  Goal: Optimal Comfort and Wellbeing  Outcome: Ongoing, Progressing  Goal: Readiness for Transition of Care  Outcome: Ongoing, Progressing     Problem: Adjustment to Illness (Gastrointestinal Bleeding)  Goal: Optimal Coping with Acute Illness  Outcome: Ongoing, Progressing     Problem: Bleeding (Gastrointestinal Bleeding)  Goal: Hemostasis  Outcome: Ongoing, Progressing     Problem: Infection  Goal: Absence of Infection Signs and Symptoms  Outcome: Ongoing, Progressing     Pt progressing towards goals. No distress notice. No falls or injuries during shift. Bed in lowest position, call light within reach, belonging at bedside. Safety precaution maintain.Plan of Care reviewed. Pt verbalized understanding.       Pt understand to start Golytely for procedure in am, Pt aware NPO  after midnight.

## 2023-09-21 NOTE — ASSESSMENT & PLAN NOTE
Diverticulosis of colon  63 y.o. with previous history of GI bleed who reports hematochezia onset this morning   - HDS   - orthostatic vitals Q shift   - previous imaging: colonoscopy, EGD  Lab Results   Component Value Date    WBC 10.05 09/21/2023    HGB 10.3 (L) 09/21/2023    HCT 29.9 (L) 09/21/2023    MCV 87 09/21/2023     09/21/2023    - serial H/Hs   - transfuse for Hgb <7   - type and screen ordered    - transfusion consent obtained  - Maintain IV access with 2 large bore IVs  - Intravascular resuscitation/support with IVFs   - Protonix 40mg IV BID  - Discontinue all ASA, NSAIDs and Heparin products  - will correct any coagulopathy with platelets and FFP to a goal of platelets >50K and INR <2.0  - GI consulted, pending recs  - NPO at midnight

## 2023-09-21 NOTE — ED PROVIDER NOTES
"Encounter Date: 9/21/2023       History     Chief Complaint   Patient presents with    Rectal Bleeding     Pt reports passing blood this am, feels weak.  Was seen on Sunday for GI bleed-has colonoscopy scheduled for tomorrow.      63-year-old male presents with passing bright red blood per rectum.  He was evaluated here 4 days ago for similar symptoms.  He is watched overnight and did not have a significant drop in his hemoglobin.  He was seen by GI and colonoscopy is scheduled for tomorrow.  He takes an 81 mg aspirin a day and this was continued.  His last dose was day before yesterday.  He returns with passing bright red blood per rectum several times today.  He feels lightheaded.  He denies abdominal pain but did have some abdominal cramps earlier.  There has been no vomiting or hematemesis.  No upper abdominal pain.  He had a colonoscopy done 5 years ago that showed internal hemorrhoids and diverticulosis.      Review of patient's allergies indicates:   Allergen Reactions    Erythromycin Other (See Comments)     Causes "stomach burning"     Past Medical History:   Diagnosis Date    Fatty liver     GERD (gastroesophageal reflux disease)     Hyperlipidemia     Sleep apnea      Past Surgical History:   Procedure Laterality Date    APPENDECTOMY      5 years old    COSMETIC SURGERY      EYE SURGERY      Lasik    OPEN REDUCTION AND INTERNAL FIXATION (ORIF) OF FRACTURE OF CALCANEUS Right 11/05/2021    Procedure: ORIF, FRACTURE, CALCANEUS;  Surgeon: Nichole Ortiz MD;  Location: [facility] OR [address] [place] [address];  Service: Orthopedics;  Laterality: Right;    VITRECTOMY       Family History   Problem Relation Age of Onset    Hypertension Mother     Stroke Father 60    Heart disease Father     Heart disease Brother 66    Prostate cancer Neg Hx     Kidney disease Neg Hx      Social History     Tobacco Use    Smoking status: Never    Smokeless tobacco: Never   Substance Use Topics    Alcohol use: Yes     Alcohol/week: 3.0 standard drinks of " alcohol     Types: 3 Shots of liquor per week    Drug use: No     Review of Systems    Physical Exam     Initial Vitals [09/21/23 0955]   BP Pulse Resp Temp SpO2   105/65 83 20 97.6 °F (36.4 °C) 98 %      MAP       --         Physical Exam    Constitutional: He appears well-developed and well-nourished.   Patient appears pale.   HENT:   Head: Normocephalic and atraumatic.   Eyes:   Conjunctiva pale.   Neck: Neck supple. No JVD present.   Normal range of motion.  Cardiovascular:  Normal rate, regular rhythm and normal heart sounds.           Pulmonary/Chest: Breath sounds normal. No respiratory distress. He has no wheezes. He has no rales.   Abdominal: Abdomen is soft. Bowel sounds are normal. He exhibits no distension. There is no abdominal tenderness.   Musculoskeletal:      Cervical back: Normal range of motion and neck supple.     Neurological: He is alert. He has normal strength. No sensory deficit.   Psychiatric: He has a normal mood and affect.         ED Course   Procedures  Labs Reviewed   CBC W/ AUTO DIFFERENTIAL - Abnormal; Notable for the following components:       Result Value    RBC 3.78 (*)     Hemoglobin 11.4 (*)     Hematocrit 33.9 (*)     All other components within normal limits   COMPREHENSIVE METABOLIC PANEL - Abnormal; Notable for the following components:    CO2 20 (*)     Calcium 8.5 (*)     Total Protein 5.8 (*)     All other components within normal limits   CBC W/ AUTO DIFFERENTIAL - Abnormal; Notable for the following components:    RBC 3.45 (*)     Hemoglobin 10.3 (*)     Hematocrit 29.9 (*)     Gran # (ANC) 8.6 (*)     Gran % 85.4 (*)     Lymph % 10.0 (*)     Mono % 3.5 (*)     All other components within normal limits   ISTAT PROCEDURE - Abnormal; Notable for the following components:    POC TCO2 (MEASURED) 20 (*)     POC Ionized Calcium 0.99 (*)     POC Hematocrit 31 (*)     All other components within normal limits   TYPE & SCREEN        ECG Results              EKG 12-lead (Final  result)  Result time 09/21/23 16:33:24      Final result by Interface, Lab In [facility] (09/21/23 16:33:24)                   Narrative:    Test Reason : K92.2,    Vent. Rate : 064 BPM     Atrial Rate : 064 BPM     P-R Int : 154 ms          QRS Dur : 098 ms      QT Int : 396 ms       P-R-T Axes : 035 045 022 degrees     QTc Int : 408 ms    Normal sinus rhythm  Normal ECG  When compared with ECG of 17-SEP-2023 06:45,  No significant change was found  Confirmed by Fabio Rain MD ([id number]) on 9/21/2023 4:33:14 PM    Referred By: LEON DIAZ           Confirmed By:Fabio Rain MD                                  Imaging Results              CTA Acute GI [place], Abdomen and Pelvis (Final result)  Result time 09/21/23 13:07:22      Final result by Uday Cook MD (09/21/23 13:07:22)                   Impression:      1. No intraluminal intestinal, intraperitoneal, or retroperitoneal hemorrhage.  2. No acute CTA abnormality.  3. Details of chronic and incidental findings, as provided in the body of the report.      Electronically signed by: Uday Cook  Date:    09/21/2023  Time:    13:07               Narrative:    EXAMINATION:  CTA ACUTE GI BLEED, ABDOMEN AND PELVIS    CLINICAL HISTORY:  GI bleed;    TECHNIQUE:  Contiguous 2.5-mm axial images were obtained through the abdomen and pelvis following the administration of 100 cc of intravenous Omnipaque 350.  Sagittal and coronal reformats and maximum intensity projections were also submitted.    COMPARISON:  None    FINDINGS:  BOWEL AND PERITONEUM    Bowel: No evidence of bowel obstruction.  Colonic diverticulosis.  Appendix not confidently identified.  No definite focal pericecal inflammation.    Intraluminal intestinal hemorrhage: None.    Free air or fluid: None.    VASCULATURE    Visceral arteries:    The celiac, superior mesenteric, bilateral renal, and inferior mesenteric arteries are patent.    Abdominal aorta and iliac arteries: No high-grade stenosis  or aneurysmal dilatation.  Mild-to-moderate atherosclerosis.    Mesenteric and portal veins: Normally patent.    Inferior vena cava: Normally patent.    ABDOMEN/PELVIS    Lower chest: Atelectasis at the lung bases.    Liver: Normal contour.  Suspect focal fat deposition marginating the falciform ligament.    Gallbladder/bile ducts: Unremarkable. No intra or extrahepatic biliary ductal dilatation    Pancreas: Unremarkable.    Spleen: Unremarkable.    Adrenals: Unremarkable.    Kidneys: Unremarkable.    Lymph nodes: No abdominal or pelvic lymphadenopathy.    Pelvis: Prostate again appears enlarged.    Urinary bladder: Unremarkable.    Body wall: Unremarkable.    Bones: No acute abnormality.  Degenerative findings in the spine, with moderate to advanced facet arthropathy bilaterally at L4-5 and L5-S1.                                       Medications   pantoprazole injection 40 mg (0 mg Intravenous Hold 9/22/23 0900)   sodium chloride 0.9% flush 10 mL (has no administration in time range)   melatonin tablet 6 mg (has no administration in time range)   ondansetron disintegrating tablet 8 mg (has no administration in time range)   polyethylene glycol packet 17 g (0 g Oral Hold 9/22/23 0900)   acetaminophen tablet 650 mg (has no administration in time range)   aluminum-magnesium hydroxide-simethicone 200-200-20 mg/5 mL suspension 30 mL (has no administration in time range)   acetaminophen tablet 650 mg (has no administration in time range)   naloxone 0.4 mg/mL injection 0.02 mg (has no administration in time range)   glucagon (human recombinant) injection 1 mg (has no administration in time range)   dextrose 10% bolus 125 mL 125 mL (has no administration in time range)   dextrose 10% bolus 250 mL 250 mL (has no administration in time range)   dextrose 40 % gel 16,000 mg (has no administration in time range)   dextrose 40 % gel 24,000 mg (has no administration in time range)   atorvastatin tablet 40 mg (40 mg Oral Given  9/21/23 2059)   cetirizine tablet 10 mg (0 mg Oral Hold 9/22/23 0900)   finasteride tablet 5 mg (5 mg Oral Given 9/21/23 2059)   paroxetine tablet 10 mg (10 mg Oral Given 9/21/23 2059)   omega-3 acid ethyl esters capsule 1 g (0 g Oral Hold 9/22/23 0900)   sodium chloride 0.9% bolus 1,000 mL 1,000 mL (0 mLs Intravenous Stopped 9/21/23 1144)   ondansetron injection 4 mg (4 mg Intravenous Given 9/21/23 1101)   iohexoL (OMNIPAQUE 350) injection 100 mL (100 mLs Intravenous Given 9/21/23 1241)   polyethylene glycol (GoLYTELY) solution (4,000 mLs Oral Given 9/21/23 1815)     Medical Decision Making  Patient with signs and symptoms consistent with GI bleed.  I wonder if he is more anemic now that he appears pale on my exam.  He feels lightheaded and is mildly hypotensive with a blood pressure 105/65.  I doubt this is an upper GI bleed.  More likely lower GI bleed.  Would not be surprised if it is diverticular in nature.    11:15 AM  I was called to the room for patient being diaphoretic and hypotensive.  Patient states that he is having crampy lower abdominal pain and feels weak and sweaty.  He feels mildly short of breath but denies any chest discomfort.  There is no jaw pain or arm pain.  He feels a funny feeling in his throat.  He has not had a bloody bowel movement in the ED nor does he feel the urge to have a bowel movement.     On exam he has a systolic in the 70s and a heart rate of 55.  He is diaphoretic.  His oropharynx is clear.  Lungs are clear.  There is no stridor.  His abdomen is soft without tenderness.  I did a stat 12 lead EKG that reveals normal sinus rhythm at 64 without acute ischemic changes.  I-STAT showed a hematocrit of 31.  I consented him for blood.    Patient has responded well to IV fluids.  Blood pressure came up to 110/60.  He started feeling much better and no longer has shortness of breath or diaphoresis.    Will wait for formal blood work.  I consulted GI at this point.  Discussed case.   Will plan on doing CTA for acute GI bleed.    Amount and/or Complexity of Data Reviewed  Labs: ordered.  Radiology: ordered.    Risk  Prescription drug management.              Attending Attestation:         Attending Critical Care:   Critical Care Times:   Direct Patient Care (initial evaluation, reassessments, and time considering the case)................................................................24 minutes.   Additional History from reviewing old medical records or taking additional history from the family, EMS, PCP, etc.......................3 minutes.   Ordering, Reviewing, and Interpreting Diagnostic Studies...............................................................................................................3 minutes.   Documentation..................................................................................................................................................................................3 minutes.   Consultation with other Physicians. .................................................................................................................................................4 minutes.   ==============================================================  Total Critical Care Time - exclusive of procedural time: 37 minutes.  ==============================================================                          Clinical Impression:   Final diagnoses:  [K92.2] Acute GI bleeding        ED Disposition Condition    Observation                 Jd Lane MD  09/22/23 0145

## 2023-09-21 NOTE — NURSING
Pt arrived to floor AAOX4 Stable condition. No stress note, pt states no pain at present time.Placed on tele box 0349. Pt NPO except meds and do understands.Call light in reach. Bed in low locked position. Side rails x2. Belongings at bedside. Pt free of fall and injuries Questions and concerns voiced and answered.Plan of care continues.

## 2023-09-22 ENCOUNTER — ANESTHESIA (OUTPATIENT)
Dept: ENDOSCOPY | Facility: HOSPITAL | Age: 63
End: 2023-09-22
Payer: COMMERCIAL

## 2023-09-22 ENCOUNTER — TELEPHONE (OUTPATIENT)
Dept: INTERNAL MEDICINE | Facility: CLINIC | Age: 63
End: 2023-09-22
Payer: COMMERCIAL

## 2023-09-22 ENCOUNTER — TELEPHONE (OUTPATIENT)
Dept: SURGERY | Facility: CLINIC | Age: 63
End: 2023-09-22
Payer: COMMERCIAL

## 2023-09-22 VITALS
HEIGHT: 66 IN | BODY MASS INDEX: 32.95 KG/M2 | RESPIRATION RATE: 15 BRPM | TEMPERATURE: 98 F | HEART RATE: 59 BPM | WEIGHT: 205 LBS | OXYGEN SATURATION: 100 % | DIASTOLIC BLOOD PRESSURE: 69 MMHG | SYSTOLIC BLOOD PRESSURE: 110 MMHG

## 2023-09-22 LAB
ANION GAP SERPL CALC-SCNC: 7 MMOL/L (ref 8–16)
BASOPHILS # BLD AUTO: 0.03 K/UL (ref 0–0.2)
BASOPHILS # BLD AUTO: 0.03 K/UL (ref 0–0.2)
BASOPHILS NFR BLD: 0.4 % (ref 0–1.9)
BASOPHILS NFR BLD: 0.5 % (ref 0–1.9)
BUN SERPL-MCNC: 14 MG/DL (ref 8–23)
CALCIUM SERPL-MCNC: 8.1 MG/DL (ref 8.7–10.5)
CHLORIDE SERPL-SCNC: 112 MMOL/L (ref 95–110)
CO2 SERPL-SCNC: 23 MMOL/L (ref 23–29)
CREAT SERPL-MCNC: 1.1 MG/DL (ref 0.5–1.4)
DIFFERENTIAL METHOD: ABNORMAL
DIFFERENTIAL METHOD: ABNORMAL
EOSINOPHIL # BLD AUTO: 0.1 K/UL (ref 0–0.5)
EOSINOPHIL # BLD AUTO: 0.2 K/UL (ref 0–0.5)
EOSINOPHIL NFR BLD: 2.1 % (ref 0–8)
EOSINOPHIL NFR BLD: 2.4 % (ref 0–8)
ERYTHROCYTE [DISTWIDTH] IN BLOOD BY AUTOMATED COUNT: 13.5 % (ref 11.5–14.5)
ERYTHROCYTE [DISTWIDTH] IN BLOOD BY AUTOMATED COUNT: 13.5 % (ref 11.5–14.5)
EST. GFR  (NO RACE VARIABLE): >60 ML/MIN/1.73 M^2
GLUCOSE SERPL-MCNC: 99 MG/DL (ref 70–110)
HCT VFR BLD AUTO: 28.8 % (ref 40–54)
HCT VFR BLD AUTO: 29.8 % (ref 40–54)
HGB BLD-MCNC: 9.4 G/DL (ref 14–18)
HGB BLD-MCNC: 9.8 G/DL (ref 14–18)
IMM GRANULOCYTES # BLD AUTO: 0.03 K/UL (ref 0–0.04)
IMM GRANULOCYTES # BLD AUTO: 0.03 K/UL (ref 0–0.04)
IMM GRANULOCYTES NFR BLD AUTO: 0.4 % (ref 0–0.5)
IMM GRANULOCYTES NFR BLD AUTO: 0.5 % (ref 0–0.5)
LYMPHOCYTES # BLD AUTO: 1.3 K/UL (ref 1–4.8)
LYMPHOCYTES # BLD AUTO: 1.4 K/UL (ref 1–4.8)
LYMPHOCYTES NFR BLD: 20.4 % (ref 18–48)
LYMPHOCYTES NFR BLD: 20.6 % (ref 18–48)
MAGNESIUM SERPL-MCNC: 2 MG/DL (ref 1.6–2.6)
MCH RBC QN AUTO: 29.9 PG (ref 27–31)
MCH RBC QN AUTO: 30.5 PG (ref 27–31)
MCHC RBC AUTO-ENTMCNC: 32.6 G/DL (ref 32–36)
MCHC RBC AUTO-ENTMCNC: 32.9 G/DL (ref 32–36)
MCV RBC AUTO: 92 FL (ref 82–98)
MCV RBC AUTO: 93 FL (ref 82–98)
MONOCYTES # BLD AUTO: 0.4 K/UL (ref 0.3–1)
MONOCYTES # BLD AUTO: 0.4 K/UL (ref 0.3–1)
MONOCYTES NFR BLD: 6.3 % (ref 4–15)
MONOCYTES NFR BLD: 6.7 % (ref 4–15)
NEUTROPHILS # BLD AUTO: 4.3 K/UL (ref 1.8–7.7)
NEUTROPHILS # BLD AUTO: 4.7 K/UL (ref 1.8–7.7)
NEUTROPHILS NFR BLD: 69.5 % (ref 38–73)
NEUTROPHILS NFR BLD: 70.2 % (ref 38–73)
NRBC BLD-RTO: 0 /100 WBC
NRBC BLD-RTO: 0 /100 WBC
PHOSPHATE SERPL-MCNC: 3.9 MG/DL (ref 2.7–4.5)
PLATELET # BLD AUTO: 191 K/UL (ref 150–450)
PLATELET # BLD AUTO: 193 K/UL (ref 150–450)
PMV BLD AUTO: 11.3 FL (ref 9.2–12.9)
PMV BLD AUTO: 11.7 FL (ref 9.2–12.9)
POTASSIUM SERPL-SCNC: 3.5 MMOL/L (ref 3.5–5.1)
RBC # BLD AUTO: 3.14 M/UL (ref 4.6–6.2)
RBC # BLD AUTO: 3.21 M/UL (ref 4.6–6.2)
SODIUM SERPL-SCNC: 142 MMOL/L (ref 136–145)
WBC # BLD AUTO: 6.13 K/UL (ref 3.9–12.7)
WBC # BLD AUTO: 6.71 K/UL (ref 3.9–12.7)

## 2023-09-22 PROCEDURE — 83735 ASSAY OF MAGNESIUM: CPT

## 2023-09-22 PROCEDURE — D9220A PRA ANESTHESIA: Mod: ANES,,, | Performed by: ANESTHESIOLOGY

## 2023-09-22 PROCEDURE — 99239 PR HOSPITAL DISCHARGE DAY,>30 MIN: ICD-10-PCS | Mod: ,,, | Performed by: STUDENT IN AN ORGANIZED HEALTH CARE EDUCATION/TRAINING PROGRAM

## 2023-09-22 PROCEDURE — 45385 PR COLONOSCOPY,REMV LESN,SNARE: ICD-10-PCS | Mod: ,,, | Performed by: INTERNAL MEDICINE

## 2023-09-22 PROCEDURE — 84100 ASSAY OF PHOSPHORUS: CPT

## 2023-09-22 PROCEDURE — 88305 TISSUE EXAM BY PATHOLOGIST: CPT | Performed by: STUDENT IN AN ORGANIZED HEALTH CARE EDUCATION/TRAINING PROGRAM

## 2023-09-22 PROCEDURE — D9220A PRA ANESTHESIA: ICD-10-PCS | Mod: CRNA,,, | Performed by: NURSE ANESTHETIST, CERTIFIED REGISTERED

## 2023-09-22 PROCEDURE — 45385 COLONOSCOPY W/LESION REMOVAL: CPT | Mod: ,,, | Performed by: INTERNAL MEDICINE

## 2023-09-22 PROCEDURE — 80048 BASIC METABOLIC PNL TOTAL CA: CPT

## 2023-09-22 PROCEDURE — 37000008 HC ANESTHESIA 1ST 15 MINUTES: Performed by: INTERNAL MEDICINE

## 2023-09-22 PROCEDURE — 99239 HOSP IP/OBS DSCHRG MGMT >30: CPT | Mod: ,,, | Performed by: STUDENT IN AN ORGANIZED HEALTH CARE EDUCATION/TRAINING PROGRAM

## 2023-09-22 PROCEDURE — D9220A PRA ANESTHESIA: Mod: CRNA,,, | Performed by: NURSE ANESTHETIST, CERTIFIED REGISTERED

## 2023-09-22 PROCEDURE — 94761 N-INVAS EAR/PLS OXIMETRY MLT: CPT

## 2023-09-22 PROCEDURE — G0378 HOSPITAL OBSERVATION PER HR: HCPCS

## 2023-09-22 PROCEDURE — 63600175 PHARM REV CODE 636 W HCPCS: Performed by: NURSE ANESTHETIST, CERTIFIED REGISTERED

## 2023-09-22 PROCEDURE — D9220A PRA ANESTHESIA: ICD-10-PCS | Mod: ANES,,, | Performed by: ANESTHESIOLOGY

## 2023-09-22 PROCEDURE — 27201089 HC SNARE, DISP (ANY): Performed by: INTERNAL MEDICINE

## 2023-09-22 PROCEDURE — 36415 COLL VENOUS BLD VENIPUNCTURE: CPT

## 2023-09-22 PROCEDURE — 88305 TISSUE EXAM BY PATHOLOGIST: ICD-10-PCS | Mod: 26,,, | Performed by: STUDENT IN AN ORGANIZED HEALTH CARE EDUCATION/TRAINING PROGRAM

## 2023-09-22 PROCEDURE — 88305 TISSUE EXAM BY PATHOLOGIST: CPT | Mod: 26,,, | Performed by: STUDENT IN AN ORGANIZED HEALTH CARE EDUCATION/TRAINING PROGRAM

## 2023-09-22 PROCEDURE — 25000003 PHARM REV CODE 250: Performed by: NURSE ANESTHETIST, CERTIFIED REGISTERED

## 2023-09-22 PROCEDURE — 85025 COMPLETE CBC W/AUTO DIFF WBC: CPT | Mod: 91

## 2023-09-22 PROCEDURE — 37000009 HC ANESTHESIA EA ADD 15 MINS: Performed by: INTERNAL MEDICINE

## 2023-09-22 PROCEDURE — 45385 COLONOSCOPY W/LESION REMOVAL: CPT | Performed by: INTERNAL MEDICINE

## 2023-09-22 RX ORDER — HYDROCORTISONE 25 MG/G
CREAM TOPICAL 2 TIMES DAILY
Qty: 30 G | Refills: 0 | Status: SHIPPED | OUTPATIENT
Start: 2023-09-22 | End: 2024-02-01

## 2023-09-22 RX ORDER — PSYLLIUM HUSK 0.4 G
0.4 CAPSULE ORAL DAILY
Qty: 30 CAPSULE | Refills: 0 | Status: SHIPPED | OUTPATIENT
Start: 2023-09-22

## 2023-09-22 RX ORDER — LIDOCAINE HYDROCHLORIDE 20 MG/ML
INJECTION, SOLUTION EPIDURAL; INFILTRATION; INTRACAUDAL; PERINEURAL
Status: DISCONTINUED | OUTPATIENT
Start: 2023-09-22 | End: 2023-09-22

## 2023-09-22 RX ORDER — PROPOFOL 10 MG/ML
VIAL (ML) INTRAVENOUS
Status: DISCONTINUED | OUTPATIENT
Start: 2023-09-22 | End: 2023-09-22

## 2023-09-22 RX ORDER — SODIUM CHLORIDE 0.9 % (FLUSH) 0.9 %
3 SYRINGE (ML) INJECTION
Status: DISCONTINUED | OUTPATIENT
Start: 2023-09-22 | End: 2023-09-22 | Stop reason: HOSPADM

## 2023-09-22 RX ADMIN — PROPOFOL 100 MG: 10 INJECTION, EMULSION INTRAVENOUS at 09:09

## 2023-09-22 RX ADMIN — SODIUM CHLORIDE: 0.9 INJECTION, SOLUTION INTRAVENOUS at 09:09

## 2023-09-22 RX ADMIN — LIDOCAINE HYDROCHLORIDE 50 MG: 20 INJECTION, SOLUTION EPIDURAL; INFILTRATION; INTRACAUDAL; PERINEURAL at 09:09

## 2023-09-22 NOTE — TELEPHONE ENCOUNTER
Pt wanted to know about the appt he has scheduled with Dr Irizarry. He has been to the ED with rectal bleeding and was told it was a hemorrhoid. Told pt depending on the hemorrhoid most are taken care of in the office. If he needed surgery scheduled it could be as soon as 9/28/23. Told him to go to the ED if he has bleeding.

## 2023-09-22 NOTE — TELEPHONE ENCOUNTER
----- Message from Khai Ash sent at 9/22/2023 11:40 AM CDT -----  Contact: [phone]  Pt calling in regards to his appt on 9/27 he wants to know when will his surgery be schedule please advise [phone]

## 2023-09-22 NOTE — TELEPHONE ENCOUNTER
Pt states he has a procedure scheduled and believes it is not necessary to schedule with primary until after . Contact given for pt to call back and schedule prn .

## 2023-09-22 NOTE — NURSING
Pt is AAOx4. No distress noted. No complaints of any at present time. Pt NPO for procedure and verbalized understanding.  Call light in reach. Bed in low locked position.   Side rails x2. Belongings at bedside.   Pt free of fall and injuries Questions and concerns voiced and answered.    Plan of care continues.

## 2023-09-22 NOTE — TRANSFER OF CARE
"Anesthesia Transfer of Care Note    Patient: Pietro Dietz    Procedure(s) Performed: Procedure(s) (LRB):  COLONOSCOPY (N/A)    Patient location: PACU    Anesthesia Type: general    Transport from OR: Transported from OR on room air with adequate spontaneous ventilation    Post pain: adequate analgesia    Post assessment: no apparent anesthetic complications    Post vital signs: stable    Level of consciousness: awake and alert    Nausea/Vomiting: no nausea/vomiting    Complications: none    Transfer of care protocol was followed      Last vitals:   Visit Vitals  /61 (Patient Position: Lying)   Pulse 78   Temp 36.7 °C (98 °F) (Temporal)   Resp 16   Ht 5' 6" (1.676 m)   Wt 93 kg (205 lb)   SpO2 95%   BMI 33.09 kg/m²     "

## 2023-09-22 NOTE — HOSPITAL COURSE
[place] [facility] H/H stable into 9/22, and pt underwent colonoscopy with GI:                         - Preparation of the colon was fair.                          - Hemorrhoids found on perianal exam. with fresh                          blood coming from hemorrhoids.                          - Diverticulosis in the entire examined colon.                          - One 6 mm polyp in the transverse colon, removed                          with a cold snare. Resected and retrieved.                          - Non-thrombosed external and internal hemorrhoids.      PLAN:                         - Return patient to hospital billings for ongoing care.                          - High fiber diet.                          - Use original regular Metamucil one tablespoon PO                          daily.                          - Anusol (pramoxine) cream: Apply externally BID                          for 7 days.                          - Outpatient referral to Colorectal surgery for                          management of hemorrhoids.                          - Repeat colonoscopy in 7 years for surveillance.                          - Continue present medications.     Pt deemed appropriate for discharge. Plan discussed with pt, who was agreeable and amenable; medications were discussed and reviewed, outpatient follow-up scheduled, ER precautions were given, all questions were answered to the pt's satisfaction, and Mr. Dietz was subsequently discharged.

## 2023-09-22 NOTE — PLAN OF CARE
[facility]  Discharge Assessment    Primary Care Provider: Nick Whittaker MD     Discharge Assessment (most recent)       BRIEF DISCHARGE ASSESSMENT - 09/22/23 1112          Discharge Planning    Assessment Type Discharge Planning Brief Assessment     Resource/Environmental Concerns none     Support Systems Spouse/significant other     Equipment Currently Used at Home none     Current Living Arrangements home     Patient/Family Anticipates Transition to home     Patient/Family Anticipated Services at Transition none     DME Needed Upon Discharge  none     Discharge Plan A Home     Discharge Plan B Home                     Pt is a 63 y.o. male admitted with acute GI Bleed and has a PMH of HTN and GERD. He lives with his wife in a 2 story house, no issues climbing stairs. Pt has not required DME in the past and is independent with his ADLs and IADLs and still works and drives. Pt's wife will provide transportation home. Andrew De Jesus, RN,BSN

## 2023-09-22 NOTE — PLAN OF CARE
Problem: Adult Inpatient Plan of Care  Goal: Plan of Care Review  Outcome: Ongoing, Progressing     Problem: Adjustment to Illness (Gastrointestinal Bleeding)  Goal: Optimal Coping with Acute Illness  Outcome: Ongoing, Progressing     Problem: Bleeding (Gastrointestinal Bleeding)  Goal: Hemostasis  Outcome: Ongoing, Progressing     Problem: Infection  Goal: Absence of Infection Signs and Symptoms  Outcome: Ongoing, Progressing

## 2023-09-22 NOTE — NURSING
Nurses Note -- 4 Eyes      9/22/2023   12:06 AM      Skin assessed during: Admit      [x] No Altered Skin Integrity Present    []Prevention Measures Documented      [] Yes- Altered Skin Integrity Present or Discovered   [] LDA Added if Not in Epic (Describe Wound)   [] New Altered Skin Integrity was Present on Admit and Documented in LDA   [] Wound Image Taken    Wound Care Consulted? No    Attending Nurse:  Francoise Damon RN/Staff Member:   Brandie

## 2023-09-22 NOTE — H&P
"    Short Stay Endoscopy History and Physical    PCP - Nick Whittaker MD    Procedure - Colonoscopy  ASA - per anesthesia  Mallampati - per anesthesia  History of Anesthesia problems - no  Family history Anesthesia problems -  no   Plan of anesthesia - General    HPI:  This is a 63 y.o. male here for evaluation of : Hematochezia. Hb 13>9.4 today.     Last colonoscopy in 2018 showed diverticulosis and small internal hemorrhoids.       ROS:  Constitutional: No fevers, chills  CV: No chest pain  Pulm: No shortness of breath  GI: see HPI  Derm: No rash    Medical History:  has a past medical history of Fatty liver, GERD (gastroesophageal reflux disease), Hyperlipidemia, and Sleep apnea.    Surgical History:  has a past surgical history that includes Appendectomy; Cosmetic surgery; Vitrectomy; Open reduction and internal fixation (ORIF) of fracture of calcaneus (Right, 11/05/2021); and Eye surgery.    Family History: family history includes Heart disease in his father; Heart disease (age of onset: 66) in his brother; Hypertension in his mother; Stroke (age of onset: 60) in his father.. Otherwise no colon cancer, inflammatory bowel disease, or GI malignancies.    Social History:  reports that he has never smoked. He has never used smokeless tobacco. He reports current alcohol use of about 3.0 standard drinks of alcohol per week. He reports that he does not use drugs.    Review of patient's allergies indicates:   Allergen Reactions    Erythromycin Other (See Comments)     Causes "stomach burning"       Medications:   Medications Prior to Admission   Medication Sig Dispense Refill Last Dose    aspirin (ECOTRIN) 81 MG EC tablet Take 81 mg by mouth once daily.       atorvastatin (LIPITOR) 40 MG tablet TAKE 1 TABLET EVERY EVENING 90 tablet 3     fexofenadine (ALLEGRA) 180 MG tablet Take 180 mg by mouth daily as needed (allergies).       finasteride (PROSCAR) 5 mg tablet TAKE 1 TABLET EVERY EVENING 90 tablet 3     " fluticasone propionate (FLONASE) 50 mcg/actuation nasal spray 1 spray by Each Nostril route daily as needed for Allergies.       ibuprofen (ADVIL,MOTRIN) 200 MG tablet Take 600 mg by mouth daily as needed for Pain.       omeprazole (PRILOSEC) 20 MG capsule Take 1 capsule (20 mg total) by mouth every evening. 90 capsule 3     paroxetine (PAXIL) 10 MG tablet Take 1 tablet (10 mg total) by mouth every evening. 90 tablet 3     VASCEPA 1 gram Cap Take 1 capsule (1,000 mg total) by mouth every evening. 90 capsule 3          Physical Exam:    Vital Signs:   Vitals:    09/22/23 0809   BP: 127/64   Pulse: 78   Resp: 16   Temp: 98 °F (36.7 °C)       General Appearance: Well appearing in no acute distress  Eyes:    No scleral icterus  ENT: lips and tongue normal  Lungs: no use of accessory muscles  Heart:  normal rate, regular rhythm  Abdomen: Soft, non tender, non distended   Extremities: no edema  Skin: No rash      Labs:  Lab Results   Component Value Date    WBC 6.13 09/22/2023    HGB 9.4 (L) 09/22/2023    HCT 28.8 (L) 09/22/2023     09/22/2023    CHOL 168 06/09/2023    TRIG 77 06/09/2023    HDL 47 06/09/2023    ALT 30 09/21/2023    AST 23 09/21/2023     09/22/2023    K 3.5 09/22/2023     (H) 09/22/2023    CREATININE 1.1 09/22/2023    BUN 14 09/22/2023    CO2 23 09/22/2023    TSH 1.427 05/26/2023    PSA 2.7 05/26/2023    INR 1.0 09/17/2023    HGBA1C 5.2 05/26/2023       I have explained the risks and benefits of endoscopy procedures to the patient including but not limited to bleeding, perforation, infection, and death.  The patient was asked if they understand and allowed to ask any further questions to their satisfaction.    Mando Lorenz MD

## 2023-09-22 NOTE — DISCHARGE SUMMARY
[facility] Observation [address] [place] [address] Medicine  Discharge Summary      Patient Name: Pietro Dietz  MRN: [id number]  ROGELIO: [id number]  Patient Class: OP- Observation  Admission Date: 9/21/2023  Hospital Length of Stay: 0 days  Discharge Date and Time:  09/22/2023 12:47 PM  Attending Physician: Kwesi Kilpatrick MD   Discharging Provider: Kwesi Kilpatrick MD  Primary Care Provider: Nick Whittaker MD  [facility] Medicine Team: [facility] Kwesi Kilpatrick MD  Primary Care Team: [facility]    HPI:   Pietro Dietz is a 63 y.o. male with diverticulosis, GERD, hypertension, hyperlipidemia and BPH being admitted to [facility] medicine for management of acute lower GI bleed. Patient was discharged from hospital medicine on 09/18 for lower GI bleed with plan for outpatient colonoscopy on 09/22. Patient reports sudden recurrence of bright red blood bowel movement this morning. No further episodes of hematuria today. Denies any associated abdominal pain, lightheadedness, dizziness, weakness, shortness of breath, chest pains, fever, chills, nausea or vomiting.       In ED: hypotensive to 70/46, improved to 135/72 with 1 L NS bolus. Vitals otherwise stable. CBC with hgb of 11.4. CMP grossly unremarkable. CTA abdomen pending. Admitted to the care of medicine for further management.       Procedure(s) (LRB):  COLONOSCOPY (N/A)      Hospital Course:   Pt admitted to [facility]. H/H stable into 9/22, and pt underwent colonoscopy with GI:                         - Preparation of the colon was fair.                          - Hemorrhoids found on perianal exam. with fresh                          blood coming from hemorrhoids.                          - Diverticulosis in the entire examined colon.                          - One 6 mm polyp in the transverse colon, removed                          with a cold snare. Resected and retrieved.                          - Non-thrombosed external and internal hemorrhoids.      PLAN:                          - Return patient to hospital billings for ongoing care.                          - High fiber diet.                          - Use original regular Metamucil one tablespoon PO                          daily.                          - Anusol (pramoxine) cream: Apply externally BID                          for 7 days.                          - Outpatient referral to Colorectal surgery for                          management of hemorrhoids.                          - Repeat colonoscopy in 7 years for surveillance.                          - Continue present medications.     Pt deemed appropriate for discharge. Plan discussed with pt, who was agreeable and amenable; medications were discussed and reviewed, outpatient follow-up scheduled, ER precautions were given, all questions were answered to the pt's satisfaction, and Mr. Dietz was subsequently discharged.         Goals of Care Treatment Preferences:  Code Status: Full Code      Consults:   Consults (From admission, onward)        Status Ordering Provider     Inpatient consult to Gastroenterology  Once        Provider:  (Not yet assigned)    Completed ANTWON WILDE          Oncology  Acute blood loss anemia  Patient's anemia is currently controlled. S/p 0 units of PRBCs. Etiology likely d/t acute blood loss  Current CBC reviewed-   Lab Results   Component Value Date    HGB 10.3 (L) 09/21/2023    HCT 29.9 (L) 09/21/2023   Monitor serial CBC and transfuse if patient becomes hemodynamically unstable, symptomatic or H/H drops below 7/21.       Final Active Diagnoses:    Diagnosis Date Noted POA    PRINCIPAL PROBLEM:  Acute GI bleeding [K92.2] 09/21/2023 Yes    Diverticulosis of colon [K57.30] 09/21/2023 Yes    Acute blood loss anemia [D62] 09/21/2023 Yes    BPH (benign prostatic hyperplasia) [N40.0] 09/17/2023 Yes    Anxiety [F41.9] 06/07/2023 Yes    GERD (gastroesophageal reflux disease) [K21.9]  Yes    Hyperlipidemia [E78.5]  Yes      Problems  Resolved During this Admission:       Discharged Condition: stable    Disposition: Home or Self Care    Follow Up:   Follow-up Information     Nick Whittaker MD. Schedule an appointment as soon as possible for a visit.    Specialty: Family Medicine  Contact information:  [address] [facility]  [place] [address]  [phone]                       Patient Instructions:      Ambulatory referral/consult to Colorectal Surgery   Standing Status: Future   Referral Priority: Routine Referral Type: Consultation   Referral Reason: Specialty Services Required   Requested Specialty: Colon and Rectal Surgery   Number of Visits Requested: 1     Ambulatory referral/consult to Family Practice   Standing Status: Future   Referral Priority: Routine Referral Type: Consultation   Referral Reason: Specialty Services Required   Requested Specialty: Family Medicine   Number of Visits Requested: 1     Diet Cardiac     Notify your health care provider if you experience any of the following:  increased confusion or weakness     Notify your health care provider if you experience any of the following:  persistent dizziness, light-headedness, or visual disturbances     Notify your health care provider if you experience any of the following:  worsening rash     Notify your health care provider if you experience any of the following:  severe persistent headache     Notify your health care provider if you experience any of the following:  difficulty breathing or increased cough     Notify your health care provider if you experience any of the following:  severe uncontrolled pain     Notify your health care provider if you experience any of the following:  persistent nausea and vomiting or diarrhea     Notify your health care provider if you experience any of the following:  temperature >100.4     Activity as tolerated       Significant Diagnostic Studies: Labs: All labs within the past 24 hours have been reviewed    Pending Diagnostic  Studies:     Procedure Component Value Units Date/Time    Specimen to Pathology, Surgery Gastrointestinal tract [[id number]] Collected: 09/22/23 0930    Order Status: Sent Lab Status: In process Updated: 09/22/23 0930    Specimen: Tissue          Medications:  Reconciled Home Medications:      Medication List      START taking these medications    hydrocortisone 2.5 % cream  Apply topically 2 (two) times daily. for 14 days     psyllium husk 0.4 gram Cap  Commonly known as: MetamuciL  Take 0.4 g by mouth once daily.        CONTINUE taking these medications    aspirin 81 MG EC tablet  Commonly known as: ECOTRIN  Take 81 mg by mouth once daily.     atorvastatin 40 MG tablet  Commonly known as: LIPITOR  TAKE 1 TABLET EVERY EVENING     fexofenadine 180 MG tablet  Commonly known as: ALLEGRA  Take 180 mg by mouth daily as needed (allergies).     finasteride 5 mg tablet  Commonly known as: PROSCAR  TAKE 1 TABLET EVERY EVENING     fluticasone propionate 50 mcg/actuation nasal spray  Commonly known as: FLONASE  1 spray by Each Nostril route daily as needed for Allergies.     ibuprofen 200 MG tablet  Commonly known as: ADVIL,MOTRIN  Take 600 mg by mouth daily as needed for Pain.     omeprazole 20 MG capsule  Commonly known as: PRILOSEC  Take 1 capsule (20 mg total) by mouth every evening.     paroxetine 10 MG tablet  Commonly known as: PAXIL  Take 1 tablet (10 mg total) by mouth every evening.     VASCEPA 1 gram Cap  Generic drug: icosapent ethyL  Take 1 capsule (1,000 mg total) by mouth every evening.            Indwelling Lines/Drains at time of discharge:   Lines/Drains/Airways     None                 Time spent on the discharge of patient: 35 minutes         Kwesi Kilpatrick MD  Attending Physician  Medical Director - [facility] Observation Unit  Department of Hospital Medicine  9/22/2023

## 2023-09-22 NOTE — TELEPHONE ENCOUNTER
----- Message from Khai Ash sent at 9/22/2023 11:40 AM CDT -----  Contact: [phone]  Pt calling in regards to his appt on 9/27 he wants to know when will his surgery be schedule please advise [phone]

## 2023-09-22 NOTE — PROVATION PATIENT INSTRUCTIONS
Discharge Summary/Instructions after an Endoscopic Procedure  Patient Name: Pietro Dietz  Patient MRN: [id number]  Patient YOB: 1960 Friday, September 22, 2023  Hans Davis MD  Dear patient,  As a result of recent federal legislation (The Federal Cures Act), you may   receive lab or pathology results from your procedure in your [facility]   account before your physician is able to contact you. Your physician or   their representative will relay the results to you with their   recommendations at their soonest availability.  Thank you,  RESTRICTIONS:  During your procedure today, you received medications for sedation.  These   medications may affect your judgment, balance and coordination.  Therefore,   for 24 hours, you have the following restrictions:   - DO NOT drive a car, operate machinery, make legal/financial decisions,   sign important papers or drink alcohol.    ACTIVITY:  Today: no heavy lifting, straining or running due to procedural   sedation/anesthesia.  The following day: return to full activity including work.  DIET:  Eat and drink normally unless instructed otherwise.     TREATMENT FOR COMMON SIDE EFFECTS:  - Mild abdominal pain, nausea, belching, bloating or excessive gas:  rest,   eat lightly and use a heating pad.  - Sore Throat: treat with throat lozenges and/or gargle with warm salt   water.  - Because air was used during the procedure, expelling large amounts of air   from your rectum or belching is normal.  - If a bowel prep was taken, you may not have a bowel movement for 1-3 days.    This is normal.  SYMPTOMS TO WATCH FOR AND REPORT TO YOUR PHYSICIAN:  1. Abdominal pain or bloating, other than gas cramps.  2. Chest pain.  3. Back pain.  4. Signs of infection such as: chills or fever occurring within 24 hours   after the procedure.  5. Rectal bleeding, which would show as bright red, maroon, or black stools.   (A tablespoon of blood from the rectum is not serious, especially if    hemorrhoids are present.)  6. Vomiting.  7. Weakness or dizziness.  GO DIRECTLY TO THE NEAREST EMERGENCY ROOM IF YOU HAVE ANY OF THE FOLLOWING:      Difficulty breathing              Chills and/or fever over 101 F   Persistent vomiting and/or vomiting blood   Severe abdominal pain   Severe chest pain   Black, tarry stools   Bleeding- more than one tablespoon   Any other symptom or condition that you feel may need urgent attention  Your doctor recommends these additional instructions:  If any biopsies were taken, your doctors clinic will contact you in 1 to 2   weeks with any results.  - Return patient to hospital billings for ongoing care.   - High fiber diet.   - Use original regular Metamucil one tablespoon PO daily.   - Anusol (pramoxine) cream: Apply externally BID for 7 days.   - Outpatient referral to Colorectal surgery for management of hemorrhoids.   - Repeat colonoscopy in 7 years for surveillance.   - Continue present medications.  For questions, problems or results please call your physician - Hans Davis MD at Work:  [phone].  [facility], EMERGENCY ROOM PHONE NUMBER: [phone]  IF A COMPLICATION OR EMERGENCY SITUATION ARISES AND YOU ARE UNABLE TO REACH   YOUR PHYSICIAN - GO DIRECTLY TO THE EMERGENCY ROOM.  Hans Davis MD  9/22/2023 9:38:58 AM  This report has been verified and signed electronically.  Dear patient,  As a result of recent federal legislation (The Federal Cures Act), you may   receive lab or pathology results from your procedure in your [facility]   account before your physician is able to contact you. Your physician or   their representative will relay the results to you with their   recommendations at their soonest availability.  Thank you,  PROVATION

## 2023-09-22 NOTE — PLAN OF CARE
[facility]  Discharge Final Note    Primary Care Provider: Nick Whittaker MD    Expected Discharge Date: 9/22/2023  Future Appointments   Date Time Provider Department Center   9/27/2023  3:40 PM ETTA Irizarry MD [facility]     Pt discharged home with no services.  attempted to schedule PCP referral. No appointments available in the requested time,   will send message to clinic nurse to contact pt/family with appointmtent. Note put in AVS.  Andrew De Jesus, RN,BSN      Final Discharge Note (most recent)       Final Note - 09/22/23 1325          Final Note    Assessment Type Final Discharge Note     Anticipated Discharge Disposition Home or Self Care     Hospital Resources/Appts/Education Provided Provided patient/caregiver with written discharge plan information;Appointments scheduled and added to AVS;Post-Acute resouces added to AVS        Post-Acute Status    Discharge Delays None known at this time                     Important Message from Medicare             Contact Info       Nick Whittaker MD   Specialty: Family Medicine   Relationship: PCP - General    [address] [facility] [address]   Phone: [phone]       Next Steps: Schedule an appointment as soon as possible for a visit    Instructions: The clinic nurse will call you with a follow up appointment. If you do not hear from them in 48 hours, please call [phone]

## 2023-09-22 NOTE — TELEPHONE ENCOUNTER
----- Message from Rhea Mantilla sent at 9/22/2023  1:19 PM CDT -----  Contact: [phone] Patient  Patient needs a Hosp follow up appt with their PCP only.       When is the next available appointment:  10/2023    Symptoms:      Discharge date:09/22/2023    Needs to be seen by:09/29/2023    Would you prefer an answer via [employer]?: Please call back patient. Thank you     Comments:

## 2023-09-22 NOTE — PLAN OF CARE
Problem: Adult Inpatient Plan of Care  Goal: Plan of Care Review  Outcome: Met  Goal: Patient-Specific Goal (Individualized)  Outcome: Met  Goal: Absence of Hospital-Acquired Illness or Injury  Outcome: Met  Goal: Optimal Comfort and Wellbeing  Outcome: Met  Goal: Readiness for Transition of Care  Outcome: Met     Problem: Adjustment to Illness (Gastrointestinal Bleeding)  Goal: Optimal Coping with Acute Illness  Outcome: Met     Problem: Bleeding (Gastrointestinal Bleeding)  Goal: Hemostasis  Outcome: Met     Problem: Infection  Goal: Absence of Infection Signs and Symptoms  Outcome: Met   Pt discharged, AVS given,education completed. Pt verbalized understanding. All questions and concerns were met.

## 2023-09-22 NOTE — TREATMENT PLAN
Brief GI Treatment Plan    Findings on colonoscopy today:                         - Preparation of the colon was fair.                          - Hemorrhoids found on perianal exam. with fresh                          blood coming from hemorrhoids.                          - Diverticulosis in the entire examined colon.                          - One 6 mm polyp in the transverse colon, removed                          with a cold snare. Resected and retrieved.                          - Non-thrombosed external and internal hemorrhoids.     PLAN:                         - Return patient to hospital billings for ongoing care.                          - High fiber diet.                          - Use original regular Metamucil one tablespoon PO                          daily.                          - Anusol (pramoxine) cream: Apply externally BID                          for 7 days.                          - Outpatient referral to Colorectal surgery for                          management of hemorrhoids.                          - Repeat colonoscopy in 7 years for surveillance.                          - Continue present medications.     We will sign off.     Mando Lorenz MD  PGY-V, Gastroenterology & Hepatology

## 2023-09-22 NOTE — NURSING TRANSFER
Nursing Transfer Note      9/22/2023   10:05 AM    Transfer To: 1152    Transfer via stretcher    Transfer with cardiac monitoring    Transported by PCT    Transfer Vital Signs: see flowsheet     Telemetry: Box Number [id number], Rate 61, and Rhythm NSR  Order for Tele Monitor? Yes    4eyes on Skin: yes    Patient belongings transferred with patient: Yes    Chart send with patient: Yes    Notified: spouse    Patient reassessed at: 0940

## 2023-09-22 NOTE — ANESTHESIA POSTPROCEDURE EVALUATION
Anesthesia Post Evaluation    Patient: Pietro Dietz    Procedure(s) Performed: Procedure(s) (LRB):  COLONOSCOPY (N/A)    Final Anesthesia Type: general      Patient location during evaluation: PACU  Patient participation: Yes- Able to Participate  Level of consciousness: awake and alert  Post-procedure vital signs: reviewed and stable  Pain management: adequate  Airway patency: patent    PONV status at discharge: No PONV  Anesthetic complications: no      Cardiovascular status: blood pressure returned to baseline  Respiratory status: unassisted  Hydration status: euvolemic  Follow-up not needed.          Vitals Value Taken Time   /69 09/22/23 1016   Temp 36.8 °C (98.2 °F) 09/22/23 0940   Pulse 58 09/22/23 1019   Resp 14 09/22/23 1019   SpO2 97 % 09/22/23 1019   Vitals shown include unvalidated device data.      Event Time   Out of Recovery 10:20:00         Pain/Olvie Score: Olive Score: 10 (9/22/2023  9:45 AM)

## 2023-09-26 LAB
FINAL PATHOLOGIC DIAGNOSIS: NORMAL
GROSS: NORMAL
Lab: NORMAL

## 2023-09-26 NOTE — PROGRESS NOTES
"PI:  Pietro Dietz is a 63 y.o. male with history of BRBPR.       9-  dmitted for 5 bloody diarrheal BMs painless, turned water red.    9-  happened one other time    Stopped baby aspirin.  No bleeding since.      9-22-23 colonoscopy  Findings:        Hemorrhoids were found on perianal exam.        Many small-mouthed diverticula were found in the entire colon.        A 6 mm polyp was found in the transverse colon. The polyp was        removed with a cold snare. Resection and retrieval were complete.        Non-thrombosed external and internal hemorrhoids were found during        retroflexion. The hemorrhoids were moderate.    Pt ermphatically requested hemorrhoid treatment today  No pain.  Pressure sensation and possible protrusion  BMs formed, 2 per day.  Does not strain.  Wipes several times to clean    Past Medical History:   Diagnosis Date    Fatty liver     GERD (gastroesophageal reflux disease)     Hyperlipidemia     Sleep apnea         Past Surgical History:   Procedure Laterality Date    APPENDECTOMY      5 years old    COLONOSCOPY N/A 9/22/2023    Procedure: COLONOSCOPY;  Surgeon: Hans Davis MD;  Location: [facility] ([address] [place] [address]);  Service: Endoscopy;  Laterality: N/A;    COSMETIC SURGERY      EYE SURGERY      Lasik    OPEN REDUCTION AND INTERNAL FIXATION (ORIF) OF FRACTURE OF CALCANEUS Right 11/05/2021    Procedure: ORIF, FRACTURE, CALCANEUS;  Surgeon: Nichole Ortiz MD;  Location: [facility] OR [address] [place] [address];  Service: Orthopedics;  Laterality: Right;    VITRECTOMY         Review of patient's allergies indicates:   Allergen Reactions    Erythromycin Other (See Comments)     Causes "stomach burning"       Family History   Problem Relation Age of Onset    Hypertension Mother     Stroke Father 60    Heart disease Father     Heart disease Brother 66    Prostate cancer Neg Hx     Kidney disease Neg Hx        Social History     Socioeconomic History    Marital status:    Tobacco Use    Smoking " "status: Never    Smokeless tobacco: Never   Substance and Sexual Activity    Alcohol use: Yes     Alcohol/week: 3.0 standard drinks of alcohol     Types: 3 Shots of liquor per week    Drug use: No    Sexual activity: Not Currently     Partners: Female       ROS:  GENERAL: No fever, chills, fatigability or weight loss.  Integument: No rashes, redness, icterus  CHEST: Denies MALDONADO, cyanosis, wheezing, cough and sputum production.  CARDIOVASCULAR: Denies chest pain, PND, orthopnea or reduced exercise tolerance.  GI: Denies abd pain, dysphagia, nausea, vomiting, no hematemesis   : Denies burning on urination, no hematuria, no bacteriuria  MSK: No deformities, swelling, joint pain swelling  Neurologic: No HAs, seizures, weakness, paresthesias, gait problems    PE:  General appearance healthy  /74 (BP Location: Right arm, Patient Position: Sitting, BP Method: X-Large (Automatic))   Pulse 80   Resp 18   Ht 5' 6" (1.676 m)   Wt 93.6 kg (206 lb 5.6 oz)   BMI 33.31 kg/m²     Sclera/ Skin anicteric  AT NC EOMI  Neck supple trachea midline   Chest symmetric, nl excursion, no retractions, breathing comfortably  EXT - no CCE  Neuro:  Mood/ affect nl, alert and oriented x 3, moves all ext's, gait nl    Rectal  Inspection     No fissures.  No external hemorrhoids  TICO nl tone, no mass      Assessment:  Lower GI bleed, probable diverticular due to volume of blood  Grade 2-3 internal hemorrhoids    Plan:  Pt emphatically requests hemorrhoid treatment - see below  Counseled pt regarding the likely cause of his LGI bleed - diverticular  High fiber diet - 25 grams per day.  Emphasis on whole grain breads such as double fiber wheat bread and high fiber cereals and bars.    Drink 8 glasses of water per day 64 - 96 oz per day  Fiber supplements such as KONSYL, METAMUCIL can be taken 1-2 x per day  Regular exercise - 30 min brisk walking 5 days per week      Hemorrhoid Rubber Band Ligation Procedure Note    Verbal consent " obtained for anoscopy and rubber band ligation.    The details of the procedure and the expected outcome discussed..  The risk of persistent symptoms, need for repeat banding, bleeding, infection discussed with pt.      Indications: The patient had a history of bleeding and prolapsing  internal hemorrhoids.    Pre-operative Diagnosis: Internal hemorrhoids with bleeding and prolapse     Post-operative Diagnosis: Internal hemorrhoids with bleeding and prolapse    Surgeon: JUAN PABLO HENDRICKS     Assistants: MA    Findings at anoscopy:    Left lateral internal hemorrhoid grade 2  Right anterior internal hemorrhoid grade 2  Right posterior internal hemorrhoid grade 2    Procedure Details   Acritical timeout was performed.  Digital rectal exam was performed.  I then placed a lubricated anoscope into the anal canal. Suction band ligation was then performed of the internal hemorrhoids in the  left, left posterior posttions.             Complications:  None; patient tolerated the procedure well.           Disposition: Discharge from office to home           Condition: stable    Instruction sheet given to patient  High fiber diet  Drink eight glasses of water per day  Miralax as necessary  OV in 6 weeks

## 2023-09-27 ENCOUNTER — OFFICE VISIT (OUTPATIENT)
Dept: SURGERY | Facility: CLINIC | Age: 63
End: 2023-09-27
Payer: COMMERCIAL

## 2023-09-27 VITALS
RESPIRATION RATE: 18 BRPM | HEIGHT: 66 IN | BODY MASS INDEX: 33.17 KG/M2 | WEIGHT: 206.38 LBS | SYSTOLIC BLOOD PRESSURE: 125 MMHG | HEART RATE: 80 BPM | DIASTOLIC BLOOD PRESSURE: 74 MMHG

## 2023-09-27 DIAGNOSIS — K92.2 LOWER GI BLEED: Primary | ICD-10-CM

## 2023-09-27 DIAGNOSIS — K64.1 GRADE II INTERNAL HEMORRHOIDS: ICD-10-CM

## 2023-09-27 DIAGNOSIS — K92.2 ACUTE GI BLEEDING: ICD-10-CM

## 2023-09-27 PROCEDURE — 3074F PR MOST RECENT SYSTOLIC BLOOD PRESSURE < 130 MM HG: ICD-10-PCS | Mod: CPTII,S$GLB,, | Performed by: COLON & RECTAL SURGERY

## 2023-09-27 PROCEDURE — 3078F PR MOST RECENT DIASTOLIC BLOOD PRESSURE < 80 MM HG: ICD-10-PCS | Mod: CPTII,S$GLB,, | Performed by: COLON & RECTAL SURGERY

## 2023-09-27 PROCEDURE — 46221 LIGATION OF HEMORRHOID(S): CPT | Mod: S$GLB,,, | Performed by: COLON & RECTAL SURGERY

## 2023-09-27 PROCEDURE — 1159F MED LIST DOCD IN RCRD: CPT | Mod: CPTII,S$GLB,, | Performed by: COLON & RECTAL SURGERY

## 2023-09-27 PROCEDURE — 1159F PR MEDICATION LIST DOCUMENTED IN MEDICAL RECORD: ICD-10-PCS | Mod: CPTII,S$GLB,, | Performed by: COLON & RECTAL SURGERY

## 2023-09-27 PROCEDURE — 99203 OFFICE O/P NEW LOW 30 MIN: CPT | Mod: 25,S$GLB,, | Performed by: COLON & RECTAL SURGERY

## 2023-09-27 PROCEDURE — 3074F SYST BP LT 130 MM HG: CPT | Mod: CPTII,S$GLB,, | Performed by: COLON & RECTAL SURGERY

## 2023-09-27 PROCEDURE — 3008F PR BODY MASS INDEX (BMI) DOCUMENTED: ICD-10-PCS | Mod: CPTII,S$GLB,, | Performed by: COLON & RECTAL SURGERY

## 2023-09-27 PROCEDURE — 99203 PR OFFICE/OUTPT VISIT, NEW, LEVL III, 30-44 MIN: ICD-10-PCS | Mod: 25,S$GLB,, | Performed by: COLON & RECTAL SURGERY

## 2023-09-27 PROCEDURE — 99999 PR PBB SHADOW E&M-EST. PATIENT-LVL III: ICD-10-PCS | Mod: PBBFAC,,, | Performed by: COLON & RECTAL SURGERY

## 2023-09-27 PROCEDURE — 3044F HG A1C LEVEL LT 7.0%: CPT | Mod: CPTII,S$GLB,, | Performed by: COLON & RECTAL SURGERY

## 2023-09-27 PROCEDURE — 46221 PR HEMORRHOIDECTOMY INTERNAL RUBBER BAND LIGATIONS: ICD-10-PCS | Mod: S$GLB,,, | Performed by: COLON & RECTAL SURGERY

## 2023-09-27 PROCEDURE — 99999 PR PBB SHADOW E&M-EST. PATIENT-LVL III: CPT | Mod: PBBFAC,,, | Performed by: COLON & RECTAL SURGERY

## 2023-09-27 PROCEDURE — 3044F PR MOST RECENT HEMOGLOBIN A1C LEVEL <7.0%: ICD-10-PCS | Mod: CPTII,S$GLB,, | Performed by: COLON & RECTAL SURGERY

## 2023-09-27 PROCEDURE — 3078F DIAST BP <80 MM HG: CPT | Mod: CPTII,S$GLB,, | Performed by: COLON & RECTAL SURGERY

## 2023-09-27 PROCEDURE — 3008F BODY MASS INDEX DOCD: CPT | Mod: CPTII,S$GLB,, | Performed by: COLON & RECTAL SURGERY

## 2023-09-28 ENCOUNTER — PATIENT MESSAGE (OUTPATIENT)
Dept: SURGERY | Facility: CLINIC | Age: 63
End: 2023-09-28
Payer: COMMERCIAL

## 2023-10-16 DIAGNOSIS — E78.49 OTHER HYPERLIPIDEMIA: ICD-10-CM

## 2023-10-16 NOTE — TELEPHONE ENCOUNTER
No care due was identified.  Health [facility] Embedded Care Due Messages. Reference number: [id number].   10/16/2023 12:51:02 AM CDT

## 2023-10-17 RX ORDER — ICOSAPENT ETHYL 1000 MG/1
1 CAPSULE ORAL NIGHTLY
Qty: 90 CAPSULE | Refills: 3 | Status: SHIPPED | OUTPATIENT
Start: 2023-10-17 | End: 2024-02-01

## 2023-10-17 NOTE — TELEPHONE ENCOUNTER
Refill Routing Note   Medication(s) are not appropriate for processing by [facility] for the following reason(s):      Patient seen in ED/Hospital since LOV with provider    [facility] action(s):  Defer Care Due:  None identified          Extended chart review required: Yes     Appointments  past 12m or future 3m with PCP    Date Provider   Last Visit   6/7/2023 Nick Whittaker MD   Next Visit   Visit date not found Nick Whittaker MD   ED visits in past 90 days: 0        Note composed:2:50 AM 10/17/2023

## 2023-12-13 ENCOUNTER — OFFICE VISIT (OUTPATIENT)
Dept: SURGERY | Facility: CLINIC | Age: 63
End: 2023-12-13
Payer: COMMERCIAL

## 2023-12-13 VITALS
SYSTOLIC BLOOD PRESSURE: 136 MMHG | DIASTOLIC BLOOD PRESSURE: 81 MMHG | WEIGHT: 205 LBS | HEART RATE: 63 BPM | BODY MASS INDEX: 33.09 KG/M2

## 2023-12-13 DIAGNOSIS — K64.2 PROLAPSED INTERNAL HEMORRHOIDS, GRADE 3: ICD-10-CM

## 2023-12-13 DIAGNOSIS — K64.8 BLEEDING INTERNAL HEMORRHOIDS: Primary | ICD-10-CM

## 2023-12-13 PROCEDURE — 99213 OFFICE O/P EST LOW 20 MIN: CPT | Mod: S$GLB,,, | Performed by: COLON & RECTAL SURGERY

## 2023-12-13 PROCEDURE — 3079F PR MOST RECENT DIASTOLIC BLOOD PRESSURE 80-89 MM HG: ICD-10-PCS | Mod: CPTII,S$GLB,, | Performed by: COLON & RECTAL SURGERY

## 2023-12-13 PROCEDURE — 1159F MED LIST DOCD IN RCRD: CPT | Mod: CPTII,S$GLB,, | Performed by: COLON & RECTAL SURGERY

## 2023-12-13 PROCEDURE — 99999 PR PBB SHADOW E&M-EST. PATIENT-LVL III: ICD-10-PCS | Mod: PBBFAC,,, | Performed by: COLON & RECTAL SURGERY

## 2023-12-13 PROCEDURE — 3044F HG A1C LEVEL LT 7.0%: CPT | Mod: CPTII,S$GLB,, | Performed by: COLON & RECTAL SURGERY

## 2023-12-13 PROCEDURE — 99999 PR PBB SHADOW E&M-EST. PATIENT-LVL III: CPT | Mod: PBBFAC,,, | Performed by: COLON & RECTAL SURGERY

## 2023-12-13 PROCEDURE — 3008F PR BODY MASS INDEX (BMI) DOCUMENTED: ICD-10-PCS | Mod: CPTII,S$GLB,, | Performed by: COLON & RECTAL SURGERY

## 2023-12-13 PROCEDURE — 3079F DIAST BP 80-89 MM HG: CPT | Mod: CPTII,S$GLB,, | Performed by: COLON & RECTAL SURGERY

## 2023-12-13 PROCEDURE — 3075F SYST BP GE 130 - 139MM HG: CPT | Mod: CPTII,S$GLB,, | Performed by: COLON & RECTAL SURGERY

## 2023-12-13 PROCEDURE — 3075F PR MOST RECENT SYSTOLIC BLOOD PRESS GE 130-139MM HG: ICD-10-PCS | Mod: CPTII,S$GLB,, | Performed by: COLON & RECTAL SURGERY

## 2023-12-13 PROCEDURE — 3044F PR MOST RECENT HEMOGLOBIN A1C LEVEL <7.0%: ICD-10-PCS | Mod: CPTII,S$GLB,, | Performed by: COLON & RECTAL SURGERY

## 2023-12-13 PROCEDURE — 1159F PR MEDICATION LIST DOCUMENTED IN MEDICAL RECORD: ICD-10-PCS | Mod: CPTII,S$GLB,, | Performed by: COLON & RECTAL SURGERY

## 2023-12-13 PROCEDURE — 99213 PR OFFICE/OUTPT VISIT, EST, LEVL III, 20-29 MIN: ICD-10-PCS | Mod: S$GLB,,, | Performed by: COLON & RECTAL SURGERY

## 2023-12-13 PROCEDURE — 3008F BODY MASS INDEX DOCD: CPT | Mod: CPTII,S$GLB,, | Performed by: COLON & RECTAL SURGERY

## 2023-12-13 NOTE — PROGRESS NOTES
"PI:  Pietro Dietz is a 63 y.o. male with history of BRBPR.       9-  dmitted for 5 bloody diarrheal BMs painless, turned water red.    9-  happened one other time    Stopped baby aspirin.  No bleeding since.      9-22-23 colonoscopy  Findings:        Hemorrhoids were found on perianal exam.        Many small-mouthed diverticula were found in the entire colon.        A 6 mm polyp was found in the transverse colon. The polyp was        removed with a cold snare. Resection and retrieval were complete.        Non-thrombosed external and internal hemorrhoids were found during        retroflexion. The hemorrhoids were moderate.    9/26/23:  Pt ermphatically requested hemorrhoid treatment today  No pain.  Pressure sensation and possible protrusion  BMs formed, 2 per day.  Does not strain.  Wipes several times to clean  Rubber band ligation x2, LL RP positions    12/13/23: routine follow up  Reports no further bleeding and no pain. Has two formed BM per day. No straining. Taking a pre-biotic. He is pleased with his RBL.    Past Medical History:   Diagnosis Date    Fatty liver     GERD (gastroesophageal reflux disease)     Hyperlipidemia     Sleep apnea         Past Surgical History:   Procedure Laterality Date    APPENDECTOMY      5 years old    COLONOSCOPY N/A 9/22/2023    Procedure: COLONOSCOPY;  Surgeon: Hans Davis MD;  Location: [facility] [place] ([address] [place] [address]);  Service: Endoscopy;  Laterality: N/A;    COSMETIC SURGERY      EYE SURGERY      Lasik    OPEN REDUCTION AND INTERNAL FIXATION (ORIF) OF FRACTURE OF CALCANEUS Right 11/05/2021    Procedure: ORIF, FRACTURE, CALCANEUS;  Surgeon: Nichole Ortiz MD;  Location: [facility] OR [address] [place] [address];  Service: Orthopedics;  Laterality: Right;    VITRECTOMY         Review of patient's allergies indicates:   Allergen Reactions    Erythromycin Other (See Comments)     Causes "stomach burning"       Family History   Problem Relation Age of Onset    Hypertension Mother     Stroke " Father 60    Heart disease Father     Heart disease Brother 66    Prostate cancer Neg Hx     Kidney disease Neg Hx        Social History     Socioeconomic History    Marital status:    Tobacco Use    Smoking status: Never    Smokeless tobacco: Never   Substance and Sexual Activity    Alcohol use: Yes     Alcohol/week: 3.0 standard drinks of alcohol     Types: 3 Shots of liquor per week    Drug use: No    Sexual activity: Not Currently     Partners: Female       ROS:  GENERAL: No fever, chills, fatigability or weight loss.  Integument: No rashes, redness, icterus  CHEST: Denies MALDONADO, cyanosis, wheezing, cough and sputum production.  CARDIOVASCULAR: Denies chest pain, PND, orthopnea or reduced exercise tolerance.  GI: Denies abd pain, dysphagia, nausea, vomiting, no hematemesis   : Denies burning on urination, no hematuria, no bacteriuria  MSK: No deformities, swelling, joint pain swelling  Neurologic: No HAs, seizures, weakness, paresthesias, gait problems    PE:  General appearance healthy  /81 (BP Location: Right arm, Patient Position: Sitting, BP Method: Large (Automatic))   Pulse 63   Wt 93 kg (205 lb)   BMI 33.09 kg/m²     Sclera/ Skin anicteric  AT NC EOMI  Neck supple trachea midline   Chest symmetric, nl excursion, no retractions, breathing comfortably  EXT - no CCE  Neuro:  Mood/ affect nl, alert and oriented x 3, moves all ext's, gait nl    Rectal  Deferred today      Assessment:  Lower GI bleed, probable diverticular due to volume of blood  Grade 2-3 internal hemorrhoids, s/p RBL without ongoing bleeding    Plan:  Counseled pt regarding the likely cause of his LGI bleed - diverticular  Continue healthy bowel habits:  High fiber diet - 25 grams per day.  Emphasis on whole grain breads such as double fiber wheat bread and high fiber cereals and bars.    Drink 8 glasses of water per day 64 - 96 oz per day  Fiber supplements such as KONSYL, METAMUCIL can be taken 1-2 x per day  Regular  exercise - 30 min brisk walking 5 days per week    Return to clinic as needed for any bleeding or hemorrhoid issues

## 2023-12-18 PROBLEM — K62.5 BRBPR (BRIGHT RED BLOOD PER RECTUM): Status: RESOLVED | Noted: 2023-09-17 | Resolved: 2023-12-18

## 2023-12-25 PROBLEM — K92.2 ACUTE GI BLEEDING: Status: RESOLVED | Noted: 2023-09-21 | Resolved: 2023-12-25

## 2024-02-01 ENCOUNTER — PATIENT MESSAGE (OUTPATIENT)
Dept: INTERNAL MEDICINE | Facility: CLINIC | Age: 64
End: 2024-02-01
Payer: MEDICARE

## 2024-02-01 DIAGNOSIS — E78.49 OTHER HYPERLIPIDEMIA: ICD-10-CM

## 2024-02-01 DIAGNOSIS — K21.9 GASTROESOPHAGEAL REFLUX DISEASE WITHOUT ESOPHAGITIS: ICD-10-CM

## 2024-02-01 RX ORDER — ICOSAPENT ETHYL 1 G/1
1 CAPSULE ORAL NIGHTLY
Qty: 90 CAPSULE | Refills: 2 | Status: CANCELLED | OUTPATIENT
Start: 2024-02-01

## 2024-02-01 RX ORDER — ICOSAPENT ETHYL 1 G/1
1 CAPSULE ORAL NIGHTLY
Qty: 90 CAPSULE | Refills: 3 | Status: SHIPPED | OUTPATIENT
Start: 2024-02-01 | End: 2024-04-30 | Stop reason: SDUPTHER

## 2024-02-01 RX ORDER — OMEPRAZOLE 20 MG/1
20 CAPSULE, DELAYED RELEASE ORAL NIGHTLY
Qty: 90 CAPSULE | Refills: 3 | Status: SHIPPED | OUTPATIENT
Start: 2024-02-01 | End: 2024-04-30 | Stop reason: SDUPTHER

## 2024-02-01 NOTE — TELEPHONE ENCOUNTER
No care due was identified.  Health [facility] Embedded Care Due Messages. Reference number: [id number].   2/01/2024 10:14:51 AM CST

## 2024-02-22 ENCOUNTER — PATIENT MESSAGE (OUTPATIENT)
Dept: INTERNAL MEDICINE | Facility: CLINIC | Age: 64
End: 2024-02-22
Payer: MEDICARE

## 2024-04-18 ENCOUNTER — OFFICE VISIT (OUTPATIENT)
Dept: CARDIOLOGY | Facility: CLINIC | Age: 64
End: 2024-04-18
Payer: MEDICARE

## 2024-04-18 VITALS
DIASTOLIC BLOOD PRESSURE: 83 MMHG | SYSTOLIC BLOOD PRESSURE: 123 MMHG | BODY MASS INDEX: 35.72 KG/M2 | OXYGEN SATURATION: 94 % | WEIGHT: 214.38 LBS | HEART RATE: 70 BPM | HEIGHT: 65 IN

## 2024-04-18 DIAGNOSIS — I25.10 CORONARY ARTERY DISEASE DUE TO CALCIFIED CORONARY LESION: ICD-10-CM

## 2024-04-18 DIAGNOSIS — Z82.49 FAMILY HISTORY OF CORONARY ARTERY DISEASE: ICD-10-CM

## 2024-04-18 DIAGNOSIS — I25.84 CORONARY ARTERY DISEASE DUE TO CALCIFIED CORONARY LESION: ICD-10-CM

## 2024-04-18 DIAGNOSIS — E78.00 PURE HYPERCHOLESTEROLEMIA: Primary | ICD-10-CM

## 2024-04-18 DIAGNOSIS — E66.01 SEVERE OBESITY (BMI 35.0-39.9) WITH COMORBIDITY: ICD-10-CM

## 2024-04-18 LAB
OHS QRS DURATION: 104 MS
OHS QTC CALCULATION: 412 MS

## 2024-04-18 PROCEDURE — 93005 ELECTROCARDIOGRAM TRACING: CPT | Mod: PBBFAC,PO | Performed by: INTERNAL MEDICINE

## 2024-04-18 PROCEDURE — 93010 ELECTROCARDIOGRAM REPORT: CPT | Mod: S$PBB,,, | Performed by: INTERNAL MEDICINE

## 2024-04-18 PROCEDURE — 99999 PR PBB SHADOW E&M-EST. PATIENT-LVL IV: CPT | Mod: PBBFAC,,, | Performed by: INTERNAL MEDICINE

## 2024-04-18 PROCEDURE — 99204 OFFICE O/P NEW MOD 45 MIN: CPT | Mod: S$PBB,,, | Performed by: INTERNAL MEDICINE

## 2024-04-18 PROCEDURE — 99214 OFFICE O/P EST MOD 30 MIN: CPT | Mod: PBBFAC,PO,25 | Performed by: INTERNAL MEDICINE

## 2024-04-18 RX ORDER — TRETINOIN 1 MG/G
CREAM TOPICAL NIGHTLY
COMMUNITY

## 2024-04-18 RX ORDER — EZETIMIBE 10 MG/1
10 TABLET ORAL DAILY
Qty: 90 TABLET | Refills: 3 | Status: SHIPPED | OUTPATIENT
Start: 2024-04-18 | End: 2024-05-14 | Stop reason: SDUPTHER

## 2024-04-18 NOTE — PATIENT INSTRUCTIONS
"The reference range in [facility] labs for cholesterol do not apply to you.  The following parameters are applicable to you:    Reference Range   Cholesterol      Variable   Triglycerides      < 150 mg/dl   HDL (good type)      > 50 mg/dl   LDL (bad type)      Approx 70 mg/dl    HDL/Cholesterol       > 30%           Low carb, Mediterranean style diet:     A sensible diet that limits the intake of sugars, saturated (bad) fats and trans fats while increasing the intake of unsaturated (good) fats and plant proteins is the basis of the current dietary recommendations.     We now recommend drastically reducing the intake of sugar and sugary drinks. There is less emphasis on excluding all fat and more emphasis on the types of different fats. We continue to recommend eating more vegetables.    Cholesterol in our food is generally present in relatively small amounts. New dietary guidelines are less obsessed with the amount of cholesterol. However please do not confuse this with the role of cholesterol in our blood and arteries. The liver converts certain foods into cholesterol.  It is this cholesterol and other fats that clog up our arteries.      Most foods that are high in cholesterol are also high in saturated fat. But there is much more saturated fat than cholesterol in these foods. The saturated fat content matters more than cholesterol. On the other hand, there are a handful of foods that are high in cholesterol but do not contain much saturated fat: eggs, shrimp, crab legs and crawfish are OK to eat in small quantities as long as you do not deep payan them. So a few (2-3) eggs a week are fine (both the white and the yolk), but you can eat as many egg whites as you want. Also, some of these same foods irritate the inner lining of blood vessels by inducing inflammation (see below).  This occurs even if your blood cholesterol levels are "normal". So you should avoid foods that are high in saturated fat and sugar even if " your blood cholesterol levels are normal.      Saturated fat is the bad fat - you should limit your intake of this. Deep fried foods, meats and other animal fats are high in saturated fat. Cookies, donuts and most dessert and cakes are usually high in both saturated fat and sugar.       Unsaturated fat is the good fat. It contains the same number of calories as saturated fat but these fats do not get deposited in our arteries. The Mediterranean style diet encourages the intake of unsaturated fat - olive oil, avocado and unsalted nuts. So instead of baking a piece of fish, consider pan-frying it using olive oil.     You should eat a few servings of vegetables (and fruit as long as you are not diabetic) everyday. Substitute some plant proteins in place of meat: beans, lentils, quinoa and oatmeal. They are lean proteins.     Vegetables (particularly green vegetables such as broccoli, kale and spinach) have anti-inflammatory properties. Some fruits (certain berries) have anti-oxidant properties. However I think foods that reduce vascular inflammation are much  more important than the anti-oxidant effect of fruits. Eat more of the vegetables with anti-oxidant properties.     I recommend using plant based butter  spreads that come in a tub. Brands such as [employer] make olive oil butter or avocado butter. Do not use margarine that comes in a stick.       Trans fats should definitely be avoided. Most foods that are labelled as containing 0 gms of trans fat may still contain several hundred milligrams of trans fat: creamer, margarine, dough, deep fried foods, ready made frosting, potato, corn and torilla chips, cakes, cookies, pie crusts and crackers containing shortening made with hydrogenated vegetable oil.

## 2024-04-18 NOTE — PROGRESS NOTES
"  Subjective:     Chief Complaint:  Pietro Dietz is a 64 y.o. male referred by Nick Whittaker MD for evaluation of Shortness of Breath.    Problem List and HPI:   Family history of CAD  Hypercholesterolemia  CACS 227 in 2016  Gastric ulcer w bleeding 9/2023    Mr. Pietro Dietz has a very strong family history of CAD.  A CACS in 2016 was 227. He was taking a statin at that time and was switched to atorvastatin.  On 40 mg of atorvastatin his LDL has been  mg/dL. His triglycerides have historically been normal, but he states that he was prescribed fish oil for elevated triglycerides. He does not have a history of clinical ASCVD.  He does not report chest discomfort or shortness of breath with exertion.  He retired 12 years ago but stays physically active swimming laps for 30 minutes 4 days a week.    His wife is a professional  and makes him eat salads 4 days a week but he believes she would not want him to stop eating butter.    He had a fup appt w Dr. Whittaker but that was rescheduled.      Review of patient's allergies indicates:   Allergen Reactions    Erythromycin Other (See Comments)     Causes "stomach burning"        Current Outpatient Medications   Medication Sig    aspirin (ECOTRIN) 81 MG EC tablet Take 81 mg by mouth once daily.    atorvastatin (LIPITOR) 40 MG tablet TAKE 1 TABLET EVERY EVENING    fexofenadine (ALLEGRA) 180 MG tablet Take 180 mg by mouth daily as needed (allergies).    finasteride (PROSCAR) 5 mg tablet TAKE 1 TABLET EVERY EVENING    fluticasone propionate (FLONASE) 50 mcg/actuation nasal spray 1 spray by Each Nostril route daily as needed for Allergies.    ibuprofen (ADVIL,MOTRIN) 200 MG tablet Take 600 mg by mouth daily as needed for Pain.    icosapent ethyL (VASCEPA) 1 gram Cap Take 1 capsule (1 g total) by mouth every evening.    omeprazole (PRILOSEC) 20 MG capsule Take 1 capsule (20 mg total) by mouth every evening.    paroxetine (PAXIL) 10 MG tablet Take 1 tablet (10 " "mg total) by mouth every evening.    psyllium husk (METAMUCIL) 0.4 gram Cap Take 0.4 g by mouth once daily.         Past Medical and Surgical history:  Pietro Dietz  has a past medical history of Fatty liver, GERD (gastroesophageal reflux disease), Hyperlipidemia, and Sleep apnea.   Past Surgical History:   Procedure Laterality Date    APPENDECTOMY      5 years old    COLONOSCOPY N/A 9/22/2023    Procedure: COLONOSCOPY;  Surgeon: Hans Davis MD;  Location: [facility] ([address] [place] [address]);  Service: Endoscopy;  Laterality: N/A;    COSMETIC SURGERY      EYE SURGERY      Lasik    OPEN REDUCTION AND INTERNAL FIXATION (ORIF) OF FRACTURE OF CALCANEUS Right 11/05/2021    Procedure: ORIF, FRACTURE, CALCANEUS;  Surgeon: Nichole Ortiz MD;  Location: [facility] OR [address] [place] [address];  Service: Orthopedics;  Laterality: Right;    VITRECTOMY         Social history:  Pietro Dietz  reports that he has never smoked. He has never used smokeless tobacco. He reports current alcohol use of about 3.0 standard drinks of alcohol per week. He reports that he does not use drugs.    Family history:  His father and brother had MIs in their mid 60s.      Objective:   /83   Pulse 70   Ht 5' 5" (1.651 m)   Wt 97.3 kg (214 lb 6.4 oz)   SpO2 (!) 94%   BMI 35.68 kg/m²    Physical Exam  Constitutional:       Appearance: He is well-developed.      Comments:      HENT:      Head: Normocephalic and atraumatic.   Neck:      Vascular: No carotid bruit or JVD.   Cardiovascular:      Rate and Rhythm: Normal rate and regular rhythm.      Pulses:           Radial pulses are 2+ on the right side and 2+ on the left side.        Posterior tibial pulses are 2+ on the right side and 2+ on the left side.      Heart sounds: S1 normal and S2 normal. No murmur heard.     No gallop.   Pulmonary:      Effort: Pulmonary effort is normal.      Breath sounds: No wheezing or rales.   Chest:      Chest wall: There is no dullness to percussion.   Abdominal:      Palpations: Abdomen " is soft. There is no hepatomegaly or splenomegaly.      Tenderness: There is no abdominal tenderness.   Musculoskeletal:      Right lower leg: No edema.      Left lower leg: No edema.   Skin:     General: Skin is warm and dry.      Findings: No bruising.      Nails: There is no clubbing.   Neurological:      Mental Status: He is alert and oriented to person, place, and time.      Gait: Gait normal.   Psychiatric:         Speech: Speech normal.         Behavior: Behavior normal.         Thought Content: Thought content normal.         Judgment: Judgment normal.         Labs  Lipids:  Recent Labs   Lab 06/09/23  0937   LDL Cholesterol 105.6   HDL 47   Cholesterol 168      Renal:  Recent Labs   Lab 09/22/23  0516   Creatinine 1.1   Potassium 3.5   CO2 23   BUN 14     Liver:  Recent Labs   Lab 09/21/23  1038   AST 23   ALT 30     Cbc:    Lab Results   Component Value Date    WBC 6.71 09/22/2023    HGB 9.8 (L) 09/22/2023    HCT 29.8 (L) 09/22/2023    MCV 93 09/22/2023     09/22/2023           Assessment and Plan:       ICD-10-CM ICD-9-CM   1. Pure hypercholesterolemia  E78.00 272.0   2. Coronary artery disease due to calcified coronary lesion  I25.10 414.00    I25.84 414.4   3. Family history of coronary artery disease  Z82.49 V17.3   4. BMI 35  E66.01 278.01        He has a strong family h/o CAD. On 40 mg of atorvastatin LDL has been ~ 100 mg/dL.  In view of a CACS of 227 in 2016 I recommend LDL goal of 70 or less.  Add ezetimibe.  Detailed discussion with patient about role of stress testing, CTA with FFR and angiography. I stressed that none of these tests had the ability to predict long-term MACE and that the best approach would be to lower his LDL through a combination of diet, exercise and medication. Continue low dose aspirin.  BMI is elevated but this is in part due to high muscle mass.       Orders entered during this encounter:    Pure hypercholesterolemia  -     IN OFFICE EKG 12-LEAD (to Muse)  -      ezetimibe (ZETIA) 10 mg tablet; Take 1 tablet (10 mg total) by mouth once daily.  Dispense: 90 tablet; Refill: 3  -     LIPOPROTEIN A (LPA); Future; Expected date: 04/18/2024  -     Lipid Panel; Future; Expected date: 07/18/2024  -     Comprehensive Metabolic Panel; Future; Expected date: 07/18/2024    Coronary artery disease due to calcified coronary lesion    Family history of coronary artery disease    BMI 35       Total duration of face to face visit time 40 minutes.  Total time spent counseling greater than fifty percent of total visit time.  Counseling included cholesterol education, reviewing prior test results, a discussion regarding our current understanding of vascular inflammation and the role of diet, statins, aspirin and exercise in the prevention of myocardial infarction and stroke.      Follow up in about 3 months (around 7/18/2024).

## 2024-04-30 ENCOUNTER — OFFICE VISIT (OUTPATIENT)
Dept: INTERNAL MEDICINE | Facility: CLINIC | Age: 64
End: 2024-04-30
Payer: MEDICARE

## 2024-04-30 VITALS
WEIGHT: 214.75 LBS | HEIGHT: 65 IN | HEART RATE: 79 BPM | BODY MASS INDEX: 35.78 KG/M2 | TEMPERATURE: 97 F | OXYGEN SATURATION: 94 % | RESPIRATION RATE: 18 BRPM | DIASTOLIC BLOOD PRESSURE: 74 MMHG | SYSTOLIC BLOOD PRESSURE: 130 MMHG

## 2024-04-30 DIAGNOSIS — R73.01 IFG (IMPAIRED FASTING GLUCOSE): ICD-10-CM

## 2024-04-30 DIAGNOSIS — Z82.49 FAMILY HISTORY OF EARLY CAD: ICD-10-CM

## 2024-04-30 DIAGNOSIS — F41.9 ANXIETY: ICD-10-CM

## 2024-04-30 DIAGNOSIS — G47.33 OBSTRUCTIVE SLEEP APNEA SYNDROME: ICD-10-CM

## 2024-04-30 DIAGNOSIS — E78.49 OTHER HYPERLIPIDEMIA: ICD-10-CM

## 2024-04-30 DIAGNOSIS — N40.0 BENIGN PROSTATIC HYPERPLASIA, UNSPECIFIED WHETHER LOWER URINARY TRACT SYMPTOMS PRESENT: ICD-10-CM

## 2024-04-30 DIAGNOSIS — K21.9 GASTROESOPHAGEAL REFLUX DISEASE WITHOUT ESOPHAGITIS: ICD-10-CM

## 2024-04-30 DIAGNOSIS — Z23 NEED FOR PNEUMOCOCCAL 20-VALENT CONJUGATE VACCINATION: ICD-10-CM

## 2024-04-30 DIAGNOSIS — E66.01 SEVERE OBESITY (BMI 35.0-39.9) WITH COMORBIDITY: ICD-10-CM

## 2024-04-30 DIAGNOSIS — Z09 FOLLOW-UP EXAM, 7 MONTHS TO 1 YEAR SINCE PREVIOUS EXAM: Primary | ICD-10-CM

## 2024-04-30 PROCEDURE — 99215 OFFICE O/P EST HI 40 MIN: CPT | Mod: S$PBB,ICN,, | Performed by: FAMILY MEDICINE

## 2024-04-30 PROCEDURE — G0009 ADMIN PNEUMOCOCCAL VACCINE: HCPCS | Mod: PBBFAC,PO

## 2024-04-30 PROCEDURE — 99999 PR PBB SHADOW E&M-EST. PATIENT-LVL III: CPT | Mod: PBBFAC,,, | Performed by: FAMILY MEDICINE

## 2024-04-30 PROCEDURE — 99213 OFFICE O/P EST LOW 20 MIN: CPT | Mod: PBBFAC,PO | Performed by: FAMILY MEDICINE

## 2024-04-30 PROCEDURE — 99999PBSHW PR PBB SHADOW TECHNICAL ONLY FILED TO HB: Mod: PBBFAC,,,

## 2024-04-30 PROCEDURE — 90677 PCV20 VACCINE IM: CPT | Mod: PBBFAC,PO

## 2024-04-30 RX ORDER — FINASTERIDE 5 MG/1
5 TABLET, FILM COATED ORAL NIGHTLY
Qty: 90 TABLET | Refills: 3 | Status: SHIPPED | OUTPATIENT
Start: 2024-04-30

## 2024-04-30 RX ORDER — ATORVASTATIN CALCIUM 40 MG/1
40 TABLET, FILM COATED ORAL NIGHTLY
Qty: 90 TABLET | Refills: 3 | Status: SHIPPED | OUTPATIENT
Start: 2024-04-30

## 2024-04-30 RX ORDER — ICOSAPENT ETHYL 1 G/1
1 CAPSULE ORAL NIGHTLY
Qty: 90 CAPSULE | Refills: 3 | Status: SHIPPED | OUTPATIENT
Start: 2024-04-30 | End: 2025-04-30

## 2024-04-30 RX ORDER — OMEPRAZOLE 20 MG/1
20 CAPSULE, DELAYED RELEASE ORAL NIGHTLY
Qty: 90 CAPSULE | Refills: 3 | Status: SHIPPED | OUTPATIENT
Start: 2024-04-30

## 2024-04-30 RX ORDER — PAROXETINE 10 MG/1
10 TABLET, FILM COATED ORAL NIGHTLY
Qty: 90 TABLET | Refills: 3 | Status: SHIPPED | OUTPATIENT
Start: 2024-04-30

## 2024-04-30 RX ADMIN — PNEUMOCOCCAL 20-VALENT CONJUGATE VACCINE 0.5 ML
2.2; 2.2; 2.2; 2.2; 2.2; 2.2; 2.2; 2.2; 2.2; 2.2; 2.2; 2.2; 2.2; 2.2; 2.2; 2.2; 4.4; 2.2; 2.2; 2.2 INJECTION, SUSPENSION INTRAMUSCULAR at 01:04

## 2024-04-30 NOTE — PROGRESS NOTES
Subjective     Patient ID: Pietro Dietz is a 64 y.o. male.    Chief Complaint: Annual Exam    HPI 64-year-old white male with hyperlipidemia, anxiety, GERD, BPH, sleep apnea, and obesity presents to clinic today for general medical exam.  He continues to be followed by Cardiology and has underwent cardiovascular screening via a coronary calcium score in the past secondary to a strong family history of heart disease.  Coronary calcium score returned elevated.  He has been taking aspirin 81 mg daily, Lipitor 40 mg daily, and Vascepa 1 g nightly for treatment.  He has recently seen his cardiologist who recommended addition of Zetia 10 mg daily for further treatment.  Anxiety remains stable on Paxil 10 mg nightly.  GERD remains well controlled on omeprazole 20 mg daily.  He has been followed by Urology in the past secondary to BPH.  He was previously prescribed Flomax and Proscar but both cost retrograde ejaculation with resultant testicular pain.  He was attempted on Cialis 5 mg daily last year for treatment and reported improvement of his urinary flow but began to develop myalgias; therefore, he stopped the medication.  At this time I have recommended attempting Cialis 2.5 mg daily for treatment.  He reports a past surgical history of appendectomy, LASIK, cosmetic surgery, and open reduction and internal fixation of a right calcaneal fracture.  He reports a strong family history of heart disease with his brother and father both having heart attacks in their 60s.  His mother has hypertension.  He is up-to-date with all screening exams and vaccinations.   Review of Systems   Constitutional:  Negative for appetite change, chills, fatigue and fever.   HENT:  Negative for nasal congestion, ear pain, hearing loss, postnasal drip, rhinorrhea, sinus pressure/congestion, sore throat and tinnitus.    Eyes:  Negative for redness, itching and visual disturbance.   Respiratory:  Negative for cough, chest tightness and shortness  of breath.    Cardiovascular:  Negative for chest pain and palpitations.   Gastrointestinal:  Negative for abdominal pain, constipation, diarrhea, nausea and vomiting.   Genitourinary:  Negative for decreased urine volume, difficulty urinating, dysuria, frequency, hematuria and urgency.   Musculoskeletal:  Negative for back pain, myalgias, neck pain and neck stiffness.   Integumentary:  Negative for rash.   Neurological:  Negative for dizziness, light-headedness and headaches.   Psychiatric/Behavioral: Negative.            Objective     Physical Exam  Vitals and nursing note reviewed.   Constitutional:       General: He is not in acute distress.     Appearance: Normal appearance. He is well-developed. He is not diaphoretic.   HENT:      Head: Normocephalic and atraumatic.      Right Ear: External ear normal.      Left Ear: External ear normal.      Nose: Nose normal.      Mouth/Throat:      Pharynx: No oropharyngeal exudate.   Eyes:      General: No scleral icterus.        Right eye: No discharge.         Left eye: No discharge.      Conjunctiva/sclera: Conjunctivae normal.      Pupils: Pupils are equal, round, and reactive to light.   Neck:      Thyroid: No thyromegaly.      Vascular: No JVD.      Trachea: No tracheal deviation.   Cardiovascular:      Rate and Rhythm: Normal rate and regular rhythm.      Heart sounds: Normal heart sounds. No murmur heard.     No friction rub. No gallop.   Pulmonary:      Effort: Pulmonary effort is normal. No respiratory distress.      Breath sounds: Normal breath sounds. No stridor. No wheezing, rhonchi or rales.   Chest:      Chest wall: No tenderness.   Abdominal:      General: Bowel sounds are normal. There is no distension.      Palpations: Abdomen is soft. There is no mass.      Tenderness: There is no abdominal tenderness. There is no guarding or rebound.   Musculoskeletal:         General: No tenderness. Normal range of motion.      Cervical back: Normal range of motion and  neck supple.   Lymphadenopathy:      Cervical: No cervical adenopathy.   Skin:     General: Skin is warm and dry.      Coloration: Skin is not pale.      Findings: No erythema or rash.   Neurological:      Mental Status: He is alert and oriented to person, place, and time.   Psychiatric:         Mood and Affect: Mood normal.         Behavior: Behavior normal.         Thought Content: Thought content normal.         Judgment: Judgment normal.            Assessment and Plan     1. Follow-up exam, 7 months to 1 year since previous exam  -     CBC Auto Differential; Future; Expected date: 04/30/2024  -     T4, Free; Future; Expected date: 04/30/2024  -     TSH; Future; Expected date: 04/30/2024  -     Hemoglobin A1C; Future; Expected date: 04/30/2024  -     Prostate Specific Antigen, Diagnostic; Future; Expected date: 04/30/2024    2. Other hyperlipidemia  -     CBC Auto Differential; Future; Expected date: 04/30/2024  -     T4, Free; Future; Expected date: 04/30/2024  -     TSH; Future; Expected date: 04/30/2024  -     Hemoglobin A1C; Future; Expected date: 04/30/2024  -     Prostate Specific Antigen, Diagnostic; Future; Expected date: 04/30/2024  -     atorvastatin (LIPITOR) 40 MG tablet; Take 1 tablet (40 mg total) by mouth every evening.  Dispense: 90 tablet; Refill: 3  -     icosapent ethyL (VASCEPA) 1 gram Cap; Take 1 capsule (1 g total) by mouth every evening.  Dispense: 90 capsule; Refill: 3    3. Anxiety  -     CBC Auto Differential; Future; Expected date: 04/30/2024  -     T4, Free; Future; Expected date: 04/30/2024  -     TSH; Future; Expected date: 04/30/2024  -     Hemoglobin A1C; Future; Expected date: 04/30/2024  -     Prostate Specific Antigen, Diagnostic; Future; Expected date: 04/30/2024  -     paroxetine (PAXIL) 10 MG tablet; Take 1 tablet (10 mg total) by mouth every evening.  Dispense: 90 tablet; Refill: 3    4. Gastroesophageal reflux disease without esophagitis  -     CBC Auto Differential;  Future; Expected date: 04/30/2024  -     T4, Free; Future; Expected date: 04/30/2024  -     TSH; Future; Expected date: 04/30/2024  -     Hemoglobin A1C; Future; Expected date: 04/30/2024  -     Prostate Specific Antigen, Diagnostic; Future; Expected date: 04/30/2024  -     omeprazole (PRILOSEC) 20 MG capsule; Take 1 capsule (20 mg total) by mouth every evening.  Dispense: 90 capsule; Refill: 3    5. Benign prostatic hyperplasia, unspecified whether lower urinary tract symptoms present  -     CBC Auto Differential; Future; Expected date: 04/30/2024  -     T4, Free; Future; Expected date: 04/30/2024  -     TSH; Future; Expected date: 04/30/2024  -     Hemoglobin A1C; Future; Expected date: 04/30/2024  -     Prostate Specific Antigen, Diagnostic; Future; Expected date: 04/30/2024    6. Obstructive sleep apnea syndrome  -     CBC Auto Differential; Future; Expected date: 04/30/2024  -     T4, Free; Future; Expected date: 04/30/2024  -     TSH; Future; Expected date: 04/30/2024  -     Hemoglobin A1C; Future; Expected date: 04/30/2024  -     Prostate Specific Antigen, Diagnostic; Future; Expected date: 04/30/2024    7. IFG (impaired fasting glucose)  -     Hemoglobin A1C; Future; Expected date: 04/30/2024    8. Severe obesity (BMI 35.0-39.9) with comorbidity  -     CBC Auto Differential; Future; Expected date: 04/30/2024  -     T4, Free; Future; Expected date: 04/30/2024  -     TSH; Future; Expected date: 04/30/2024  -     Hemoglobin A1C; Future; Expected date: 04/30/2024  -     Prostate Specific Antigen, Diagnostic; Future; Expected date: 04/30/2024    9. Family history of early CAD  -     CBC Auto Differential; Future; Expected date: 04/30/2024  -     T4, Free; Future; Expected date: 04/30/2024  -     TSH; Future; Expected date: 04/30/2024  -     Hemoglobin A1C; Future; Expected date: 04/30/2024  -     Prostate Specific Antigen, Diagnostic; Future; Expected date: 04/30/2024    10. Benign prostatic hyperplasia with  urinary obstruction  -     CBC Auto Differential; Future; Expected date: 04/30/2024  -     T4, Free; Future; Expected date: 04/30/2024  -     TSH; Future; Expected date: 04/30/2024  -     Hemoglobin A1C; Future; Expected date: 04/30/2024  -     Prostate Specific Antigen, Diagnostic; Future; Expected date: 04/30/2024    11. Need for pneumococcal 20-valent conjugate vaccination  -     pneumoc 20-briana conj-dip cr(PF) (PREVNAR-20 (PF)) injection Syrg 0.5 mL    Other orders  -     finasteride (PROSCAR) 5 mg tablet; Take 1 tablet (5 mg total) by mouth every evening.  Dispense: 90 tablet; Refill: 3        1. CBC, CMP, TSH, free T4, fasting lipids, hemoglobin A1c, and PSA.  Labs will be coordinated with Cardiology labs.    2. Continue aspirin 81 mg daily, Lipitor 40 mg daily, Vascepa 1 g nightly, and Zetia 10 mg daily.  Continue follow-up with cardiology as scheduled for continued monitoring of hyperlipidemia.    3. Continue Paxil 10 mg nightly.  Anxiety is stable.  4. Continue omeprazole 20 mg daily.  GERD is well controlled.    5. Recommend trial of Cialis 2.5 mg daily along with Proscar 5 mg nightly for treatment of BPH.  6. Continue CPAP nightly.  Sleep apnea is well controlled.  7. Continue diet and exercise.  Weight remains stable.   8. Prevnar 20 given.    9. Return to clinic as needed or in 1 year for general exam.    I spent a total of 40 minutes on the day of the visit.This includes face to face time and non-face to face time preparing to see the patient (eg, review of tests), obtaining and/or reviewing separately obtained history, documenting clinical information in the electronic or other health record, independently interpreting results and communicating results to the patient/family/caregiver, or care coordinator.             Follow up in about 1 year (around 4/30/2025), or if symptoms worsen or fail to improve, for Annual exam.

## 2024-05-14 DIAGNOSIS — E78.00 PURE HYPERCHOLESTEROLEMIA: ICD-10-CM

## 2024-05-14 RX ORDER — EZETIMIBE 10 MG/1
10 TABLET ORAL DAILY
Qty: 90 TABLET | Refills: 3 | Status: SHIPPED | OUTPATIENT
Start: 2024-05-14 | End: 2025-05-14

## 2024-05-14 NOTE — TELEPHONE ENCOUNTER
Care Due:                  Date            Visit Type   Department     Provider  --------------------------------------------------------------------------------                                EP -                              PRIMARY      MET INTERNAL  Last Visit: 04-      CARE ([facility])   MEDICINE       Nick Whittaker  Next Visit: None Scheduled  None         None Found                                                            Last  Test          Frequency    Reason                     Performed    Due Date  --------------------------------------------------------------------------------    Lipid Panel.  12 months..  atorvastatin, icosapent..  06- 06-    [facility] Embedded Care Due Messages. Reference number: [id number].   5/14/2024 10:44:43 AM CDT

## 2024-06-11 ENCOUNTER — OFFICE VISIT (OUTPATIENT)
Dept: URGENT CARE | Facility: CLINIC | Age: 64
End: 2024-06-11
Payer: MEDICARE

## 2024-06-11 VITALS
WEIGHT: 214 LBS | DIASTOLIC BLOOD PRESSURE: 87 MMHG | SYSTOLIC BLOOD PRESSURE: 145 MMHG | BODY MASS INDEX: 34.39 KG/M2 | TEMPERATURE: 99 F | HEIGHT: 66 IN | RESPIRATION RATE: 20 BRPM | HEART RATE: 82 BPM | OXYGEN SATURATION: 95 %

## 2024-06-11 DIAGNOSIS — J40 BRONCHITIS: Primary | ICD-10-CM

## 2024-06-11 DIAGNOSIS — R05.9 COUGH, UNSPECIFIED TYPE: ICD-10-CM

## 2024-06-11 LAB
CTP QC/QA: YES
SARS-COV-2 AG RESP QL IA.RAPID: NEGATIVE

## 2024-06-11 PROCEDURE — 87811 SARS-COV-2 COVID19 W/OPTIC: CPT | Mod: QW,S$GLB,, | Performed by: FAMILY MEDICINE

## 2024-06-11 PROCEDURE — 99213 OFFICE O/P EST LOW 20 MIN: CPT | Mod: S$GLB,,, | Performed by: FAMILY MEDICINE

## 2024-06-11 RX ORDER — PREDNISONE 20 MG/1
40 TABLET ORAL DAILY
Qty: 10 TABLET | Refills: 0 | Status: SHIPPED | OUTPATIENT
Start: 2024-06-11 | End: 2024-06-16

## 2024-06-11 RX ORDER — PROMETHAZINE HYDROCHLORIDE AND DEXTROMETHORPHAN HYDROBROMIDE 6.25; 15 MG/5ML; MG/5ML
5 SYRUP ORAL EVERY 6 HOURS PRN
Qty: 180 ML | Refills: 0 | Status: SHIPPED | OUTPATIENT
Start: 2024-06-11

## 2024-06-11 RX ORDER — CETIRIZINE HYDROCHLORIDE 10 MG/1
10 TABLET ORAL DAILY
Qty: 30 TABLET | Refills: 0 | Status: SHIPPED | OUTPATIENT
Start: 2024-06-11 | End: 2024-07-11

## 2024-06-11 RX ORDER — GUAIFENESIN 600 MG/1
1200 TABLET, EXTENDED RELEASE ORAL 2 TIMES DAILY
Qty: 40 TABLET | Refills: 0 | Status: SHIPPED | OUTPATIENT
Start: 2024-06-11 | End: 2024-06-21

## 2024-06-11 NOTE — PROGRESS NOTES
"Subjective:      Patient ID: Pietro Dietz is a 64 y.o. male.    Vitals:  height is 5' 6" (1.676 m) and weight is 97.1 kg (214 lb). His oral temperature is 98.7 °F (37.1 °C). His blood pressure is 145/87 (abnormal) and his pulse is 82. His respiration is 20 and oxygen saturation is 95%.     Chief Complaint: Cough    Pt present with symptoms of- Cough and Chest Congestion x 3 week.   Provider note begins below:  Pt reports he started with a cough x 3 weeks. No fever or chills. No cp or SOB. No GI related symptoms, including, N/v/D or constipation. No anosmia or ageusia.  He took doxycyline mid may for his face. He denies any sinus congestion, admits to pnd and cough. He has tried perles, delsym, zyrtec.  He has been able to still maintain activity level.     Cough  This is a new problem. The current episode started 1 to 4 weeks ago. The problem has been gradually worsening. The problem occurs every few minutes. The cough is Productive of sputum. Associated symptoms include postnasal drip. Pertinent negatives include no chest pain, chills, ear congestion, ear pain, fever, headaches, heartburn, hemoptysis, myalgias, nasal congestion, rash, rhinorrhea, sore throat, shortness of breath, sweats, weight loss or wheezing. Nothing aggravates the symptoms. He has tried prescription cough suppressant and OTC cough suppressant (OTC- Cold Medication) for the symptoms. The treatment provided mild relief. There is no history of asthma, bronchitis or COPD.       Constitution: Negative for chills and fever.   HENT:  Positive for postnasal drip. Negative for ear pain and sore throat.    Cardiovascular:  Negative for chest pain.   Respiratory:  Positive for cough. Negative for bloody sputum, shortness of breath and wheezing.    Gastrointestinal:  Negative for heartburn.   Musculoskeletal:  Negative for muscle ache.   Skin:  Negative for rash.   Neurological:  Negative for headaches.      Objective:     Physical Exam "   Constitutional: He is oriented to person, place, and time. He appears well-developed.  Non-toxic appearance. He does not appear ill. No distress.   HENT:   Head: Normocephalic and atraumatic.   Ears:   Right Ear: External ear normal.   Left Ear: External ear normal.   Nose: Nose normal.   Mouth/Throat: Oropharynx is clear and moist.   Eyes: Conjunctivae, EOM and lids are normal. Pupils are equal, round, and reactive to light.   Neck: Trachea normal and phonation normal. Neck supple.   Pulmonary/Chest: Effort normal and breath sounds normal.   Musculoskeletal: Normal range of motion.         General: Normal range of motion.   Neurological: He is alert and oriented to person, place, and time.   Skin: Skin is warm, dry, intact and not diaphoretic.   Psychiatric: His speech is normal and behavior is normal. Judgment and thought content normal.   Nursing note and vitals reviewed.    Results for orders placed or performed in visit on 06/11/24   SARS Coronavirus 2 Antigen, POCT Manual Read   Result Value Ref Range    SARS Coronavirus 2 Antigen Negative Negative     Acceptable Yes       Assessment:     1. Bronchitis    2. Cough, unspecified type        Plan:     Cv neg, vss, lungs ctab, offered cxr he wanted to hold off  I have discussed in detail with pt the side effects of steroids including mental changes, sleep disturbance, skin changes at the injection site, suicidal ideations, increased blood pressure, increased glucose, and DVT and PE.      Discussed results/diagnosis/plan with patient in clinic. Strict precautions given to patient to monitor for worsening signs and symptoms. Advised to follow up with PCP or specialist.    Explained side effects of medications prescribed with patient and informed him/her to discontinue use if he/she has any side effects and to inform UC or PCP if this occurs. All questions answered. Strict ED verses clinic return precautions stressed and given in depth. Advised if  symptoms worsens of fail to improve he/she should go to the Emergency Room. Discharge and follow-up instructions given verbally/printed with the patient who expressed understanding and willingness to comply with my recommendations. Patient voiced understanding and in agreement with current treatment plan. Patient exits the exam room in no acute distress. Conversant and engaged during discharge discussion, verbalized understanding.      Bronchitis  -     predniSONE (DELTASONE) 20 MG tablet; Take 2 tablets (40 mg total) by mouth once daily. for 5 days  Dispense: 10 tablet; Refill: 0  -     cetirizine (ZYRTEC) 10 MG tablet; Take 1 tablet (10 mg total) by mouth once daily.  Dispense: 30 tablet; Refill: 0  -     guaiFENesin (MUCINEX) 600 mg 12 hr tablet; Take 2 tablets (1,200 mg total) by mouth 2 (two) times daily. for 10 days  Dispense: 40 tablet; Refill: 0  -     promethazine-dextromethorphan (PROMETHAZINE-DM) 6.25-15 mg/5 mL Syrp; Take 5 mLs by mouth every 6 (six) hours as needed.  Dispense: 180 mL; Refill: 0    Cough, unspecified type  -     SARS Coronavirus 2 Antigen, POCT Manual Read

## 2024-06-12 NOTE — PATIENT INSTRUCTIONS
General Discharge Instructions   PLEASE READ YOUR DISCHARGE INSTRUCTIONS ENTIRELY AS IT CONTAINS IMPORTANT INFORMATION.  If you were prescribed a narcotic or controlled medication, do not drive or operate heavy equipment or machinery while taking these medications.  If you were prescribed antibiotics, please take them to completion.  You must understand that you've received an Urgent Care treatment only and that you may be released before all your medical problems are known or treated. You, the patient, will arrange for follow up care as instructed.    OVER THE COUNTER RECOMMENDATIONS/SUGGESTIONS.    Make sure to stay well hydrated.    Use Nasal Saline to mechanically move any post nasal drip from your eustachian tube or from the back of your throat.    Use warm salt water gargles to ease your throat pain. Warm salt water gargles as needed for sore throat- 1/2 tsp salt to 1 cup warm water, gargle as desired.    Use an antihistamine such as Claritin, Zyrtec or Allegra to dry you out.    Use pseudoephedrine (behind the counter) to decongest. Pseudoephedrine 30 mg up to 240 mg /day. It can raise your blood pressure and give you palpitations.    Use mucinex (guaifenesin) to break up mucous up to 2400mg/day to loosen any mucous.    The mucinex DM pill has a cough suppressant that can be sedating. It can be used at night to stop the tickle at the back of your throat.    You can use Mucinex D (it has guaifenesin and a high dose of pseudoephedrine) in the mornings to help decongest.    Use Afrin in each nare for no longer than 3 days, as it is addictive. It can also dry out your mucous membranes and cause elevated blood pressure. This is especially useful if you are flying.    Use Flonase 1-2 sprays/nostril per day. It is a local acting steroid nasal spray, if you develop a bloody nose, stop using the medication immediately.    Sometimes Nyquil at night is beneficial to help you get some rest, however it is sedating and it  does have an antihistamine, and tylenol.    Honey is a natural cough suppressant that can be used.    Tylenol up to 4,000 mg a day is safe for short periods and can be used for body aches, pain, and fever. However in high doses and prolonged use it can cause liver irritation.    Ibuprofen is a non-steroidal anti-inflammatory that can be used for body aches, pain, and fever.However it can also cause stomach irritation if over used.     Follow up with your PCP or specialty clinic as instructed in the next 2-3 days if not improved or as needed. You can call [phone] to schedule an appointment with appropriate provider.      If you condition worsens, we recommend that you receive another evaluation at the emergency room immediately or contact your primary medical clinic's after hours call service to discuss your concerns.      Please return here or go to the Emergency Department for any concerns or worsening condition.   You can also call [phone] to schedule an appointment with the appropriate provider.    Please return here or go to the Emergency Department for any concerns or worsening of condition.    Thank you for choosing [facility] [place]!    Our goal in the Urgent Care is to always provide outstanding medical care. You may receive a survey by mail or e-mail in the next week regarding your experience today. We would greatly appreciate you completing and returning the survey. Your feedback provides us with a way to recognize our staff who provide very good care, and it helps us learn how to improve when your experience was below our aspiration of excellence.      We appreciate you trusting us with your medical care. We hope you feel better soon. We will be happy to take care of you for all of your future medical needs.    Sincerely,    AC Nascimento  Cough   If your condition worsens or fails to improve we recommend that you receive another evaluation at the ER immediately or contact your PCP  "to discuss your concerns or return here. You must understand that you've received an urgent care treatment only and that you may be released before all your medical problems are known or treated. You the patient will arrange for follwp care as instructed. .  Rest and fluids are important  Can use honey with frantz to soothe your throat  Take prescription cough meds (pills) as prescribed; take prescription cough syrup at night as needed for cough.  Do not take both the prescribed cough pills and syrup at the same time or within 6 hours of each other.  Do not take the cough syrup with any other sedative medication as it can can cause drowsiness. Do not operate any heavy machinery, drink or drive while taking the cough syrup.   -  Flonase (fluticasone) is a nasal spray which is available over the counter and may help with your symptoms.   -  If you have hypertension avoid using the "D" which is the decongestant.  Instead you can use Coricidin HBP for cold and cough symptoms.    -  If you just have drainage you can take plain Zyrtec, Claritin or Allegra   -  Tylenol or ibuprofen can also be used as directed for pain unless you have an allergy to them or medical condition such as stomach ulcers, kidney or liver disease or blood thinners etc for which you should not be taking these type of medications.   Please follow up with your primary care doctor or specialist in the next 48-72hrs as needed and if no improvement  If you  smoke, please stop smoking.    "

## 2024-06-13 ENCOUNTER — OFFICE VISIT (OUTPATIENT)
Dept: ALLERGY | Facility: CLINIC | Age: 64
End: 2024-06-13
Payer: MEDICARE

## 2024-06-13 VITALS — HEIGHT: 66 IN | WEIGHT: 217.38 LBS | BODY MASS INDEX: 34.93 KG/M2

## 2024-06-13 DIAGNOSIS — J31.0 CHRONIC RHINITIS: ICD-10-CM

## 2024-06-13 DIAGNOSIS — R05.3 CHRONIC COUGH: Primary | ICD-10-CM

## 2024-06-13 PROCEDURE — 99205 OFFICE O/P NEW HI 60 MIN: CPT | Mod: S$PBB,,, | Performed by: ALLERGY & IMMUNOLOGY

## 2024-06-13 PROCEDURE — 99999 PR PBB SHADOW E&M-EST. PATIENT-LVL III: CPT | Mod: PBBFAC,,, | Performed by: ALLERGY & IMMUNOLOGY

## 2024-06-13 PROCEDURE — 99213 OFFICE O/P EST LOW 20 MIN: CPT | Mod: PBBFAC,PO | Performed by: ALLERGY & IMMUNOLOGY

## 2024-06-13 NOTE — PROGRESS NOTES
Subjective:       Patient ID: Pietro Dietz is a 64 y.o. male.    Chief Complaint:  Cough      63 yo man presents for new patient evaluation of cough. He states he had this in July last year and now this year. Has deep dry cough. Lasts for months when occurs. Some nasal congestion but not bad sneeze, runny nose or drip. No H/O childhood asthma but his brother has similar cough and is on monthly shot for asthma and cough gone. July 2023 saw PA Worrel in ENT and had labs with negative immunocaps and step titers not well protected. Never got pneumovax but had Prevnar 4/2024. He went to  2 days ago because cough so bad and given prednisone for 5 days, cough med, Flonase 2 SEN daily, zyrtec daily and this is helping. He has GERD and is on omeprazole daily already. No food, insect or latex allergy.        Environmental History: see history section for home environment  Review of Systems   HENT:  Positive for congestion. Negative for rhinorrhea and sneezing.    Eyes:  Negative for discharge, redness and itching.   Respiratory:  Positive for cough. Negative for chest tightness, shortness of breath and wheezing.    Skin:  Negative for color change and rash.        Objective:      Physical Exam  Vitals and nursing note reviewed.   Constitutional:       General: He is not in acute distress.     Appearance: Normal appearance. He is not ill-appearing.   HENT:      Nose: No rhinorrhea.   Eyes:      General:         Right eye: No discharge.         Left eye: No discharge.      Conjunctiva/sclera: Conjunctivae normal.   Pulmonary:      Effort: Pulmonary effort is normal. No respiratory distress.   Abdominal:      General: There is no distension.   Skin:     General: Skin is warm and dry.      Findings: No erythema or rash.   Neurological:      Mental Status: He is alert and oriented to person, place, and time.   Psychiatric:         Mood and Affect: Mood normal.         Behavior: Behavior normal.         Laboratory:   none  performed   Assessment:       1. Chronic cough         Plan:       Advised chronic cough can be pulm process like asthma vs rhinitis allergic or non allergic vs infections vs reflux. Will check PFT to see if any sign of asthma as well as labs for immunocaps and repeat strep titers  Continue fluticasone 2 SEN daily and zyrtec daily  Phone review    I spent a total of 60 minutes on the day of the visit.  This includes face to face time and non-face to face time preparing to see the patient (eg, review of tests), obtaining and/or reviewing separately obtained history, documenting clinical information in the electronic or other health record, independently interpreting results and communicating results to the patient/family/caregiver, or care coordinator.

## 2024-06-19 ENCOUNTER — LAB VISIT (OUTPATIENT)
Dept: LAB | Facility: HOSPITAL | Age: 64
End: 2024-06-19
Payer: MEDICARE

## 2024-06-19 ENCOUNTER — PATIENT MESSAGE (OUTPATIENT)
Dept: ORTHOPEDICS | Facility: CLINIC | Age: 64
End: 2024-06-19
Payer: MEDICARE

## 2024-06-19 DIAGNOSIS — J31.0 CHRONIC RHINITIS: ICD-10-CM

## 2024-06-19 DIAGNOSIS — R05.3 CHRONIC COUGH: ICD-10-CM

## 2024-06-19 LAB
BASOPHILS # BLD AUTO: 0.03 K/UL (ref 0–0.2)
BASOPHILS NFR BLD: 0.4 % (ref 0–1.9)
DIFFERENTIAL METHOD BLD: ABNORMAL
EOSINOPHIL # BLD AUTO: 0.2 K/UL (ref 0–0.5)
EOSINOPHIL NFR BLD: 2.7 % (ref 0–8)
ERYTHROCYTE [DISTWIDTH] IN BLOOD BY AUTOMATED COUNT: 13.5 % (ref 11.5–14.5)
HCT VFR BLD AUTO: 44.7 % (ref 40–54)
HGB BLD-MCNC: 15.6 G/DL (ref 14–18)
IGA SERPL-MCNC: 212 MG/DL (ref 40–350)
IGE SERPL-ACNC: <35 IU/ML (ref 0–100)
IGG SERPL-MCNC: 655 MG/DL (ref 650–1600)
IGM SERPL-MCNC: 87 MG/DL (ref 50–300)
IMM GRANULOCYTES # BLD AUTO: 0.06 K/UL (ref 0–0.04)
IMM GRANULOCYTES NFR BLD AUTO: 0.8 % (ref 0–0.5)
LYMPHOCYTES # BLD AUTO: 1.5 K/UL (ref 1–4.8)
LYMPHOCYTES NFR BLD: 19.3 % (ref 18–48)
MCH RBC QN AUTO: 31.1 PG (ref 27–31)
MCHC RBC AUTO-ENTMCNC: 34.9 G/DL (ref 32–36)
MCV RBC AUTO: 89 FL (ref 82–98)
MONOCYTES # BLD AUTO: 0.5 K/UL (ref 0.3–1)
MONOCYTES NFR BLD: 5.8 % (ref 4–15)
NEUTROPHILS # BLD AUTO: 5.6 K/UL (ref 1.8–7.7)
NEUTROPHILS NFR BLD: 71 % (ref 38–73)
NRBC BLD-RTO: 0 /100 WBC
PLATELET # BLD AUTO: 246 K/UL (ref 150–450)
PMV BLD AUTO: 11.2 FL (ref 9.2–12.9)
RBC # BLD AUTO: 5.02 M/UL (ref 4.6–6.2)
WBC # BLD AUTO: 7.87 K/UL (ref 3.9–12.7)

## 2024-06-19 PROCEDURE — 82787 IGG 1 2 3 OR 4 EACH: CPT | Mod: 59 | Performed by: ALLERGY & IMMUNOLOGY

## 2024-06-19 PROCEDURE — 82785 ASSAY OF IGE: CPT | Performed by: ALLERGY & IMMUNOLOGY

## 2024-06-19 PROCEDURE — 86003 ALLG SPEC IGE CRUDE XTRC EA: CPT | Mod: 59 | Performed by: ALLERGY & IMMUNOLOGY

## 2024-06-19 PROCEDURE — 86003 ALLG SPEC IGE CRUDE XTRC EA: CPT | Performed by: ALLERGY & IMMUNOLOGY

## 2024-06-19 PROCEDURE — 82784 ASSAY IGA/IGD/IGG/IGM EACH: CPT | Mod: 59 | Performed by: ALLERGY & IMMUNOLOGY

## 2024-06-19 PROCEDURE — 85025 COMPLETE CBC W/AUTO DIFF WBC: CPT | Performed by: ALLERGY & IMMUNOLOGY

## 2024-06-19 PROCEDURE — 82784 ASSAY IGA/IGD/IGG/IGM EACH: CPT | Performed by: ALLERGY & IMMUNOLOGY

## 2024-06-20 ENCOUNTER — HOSPITAL ENCOUNTER (OUTPATIENT)
Dept: RADIOLOGY | Facility: HOSPITAL | Age: 64
Discharge: HOME | End: 2024-06-20
Attending: ORTHOPAEDIC SURGERY
Payer: MEDICARE

## 2024-06-20 ENCOUNTER — OFFICE VISIT (OUTPATIENT)
Dept: ORTHOPEDICS | Facility: CLINIC | Age: 64
End: 2024-06-20
Payer: MEDICARE

## 2024-06-20 VITALS — HEIGHT: 66 IN | WEIGHT: 217.38 LBS | BODY MASS INDEX: 34.93 KG/M2

## 2024-06-20 DIAGNOSIS — M79.645 FINGER PAIN, LEFT: Primary | ICD-10-CM

## 2024-06-20 DIAGNOSIS — M18.12 ARTHRITIS OF CARPOMETACARPAL (CMC) JOINT OF LEFT THUMB: Primary | ICD-10-CM

## 2024-06-20 DIAGNOSIS — M79.645 FINGER PAIN, LEFT: ICD-10-CM

## 2024-06-20 PROCEDURE — 20600 DRAIN/INJ JOINT/BURSA W/O US: CPT | Mod: PBBFAC,LT | Performed by: ORTHOPAEDIC SURGERY

## 2024-06-20 PROCEDURE — 73140 X-RAY EXAM OF FINGER(S): CPT | Mod: 26,LT,, | Performed by: RADIOLOGY

## 2024-06-20 PROCEDURE — 99999PBSHW PR PBB SHADOW TECHNICAL ONLY FILED TO HB: Mod: PBBFAC,,,

## 2024-06-20 PROCEDURE — 99212 OFFICE O/P EST SF 10 MIN: CPT | Mod: PBBFAC,25 | Performed by: ORTHOPAEDIC SURGERY

## 2024-06-20 PROCEDURE — 73140 X-RAY EXAM OF FINGER(S): CPT | Mod: TC,LT

## 2024-06-20 PROCEDURE — 99999 PR PBB SHADOW E&M-EST. PATIENT-LVL II: CPT | Mod: PBBFAC,,, | Performed by: ORTHOPAEDIC SURGERY

## 2024-06-20 RX ORDER — TRIAMCINOLONE ACETONIDE 40 MG/ML
40 INJECTION, SUSPENSION INTRA-ARTICULAR; INTRAMUSCULAR
Status: DISCONTINUED | OUTPATIENT
Start: 2024-06-20 | End: 2024-06-20 | Stop reason: HOSPADM

## 2024-06-20 RX ADMIN — TRIAMCINOLONE ACETONIDE 40 MG: 40 INJECTION, SUSPENSION INTRA-ARTICULAR; INTRAMUSCULAR at 02:06

## 2024-06-20 NOTE — PROGRESS NOTES
[facility]  History & Physical  Orthopedics    SUBJECTIVE:      History of Present Illness    CHIEF COMPLAINT:  The patient came in today for an initial consultation regarding persistent pain in the left thumb, suspected to be arthritis, which has been ongoing for over a year.    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 The pain is located in the carpometacarpal (CMC) joint of the thumb, intensifies upon pressure, and sometimes feels like the thumb pops out of joint. The pain is specific to the left thumb, with no similar symptoms reported in the right hand. A week ago, the patient took steroid pills for his lungs, which temporarily reduced the thumb pain. However, the pain is now gradually returning. The patient also had a sinus infection recently. Recent x-rays confirmed the presence of arthritis in the CMC joint of the left thumb. The patient expressed disappointment at the diagnosis, as he was hoping to avoid arthritis. The patient denies any pain or discomfort in the right hand and denies any other medical diagnoses.    PREVIOUS TREATMENTS:  Patient reports pain in his left thumb for over a year. A week ago, he took steroid pills for his lungs, which temporarily reduced the thumb pain. However, the pain is now gradually returning.    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    ROS:  General: -fever, -chills  Gastrointestinal: -nausea, -vomiting, -constipation, -diarrhea  Musculoskeletal: +joint pain         Past Medical History:   Diagnosis Date    Fatty liver     GERD (gastroesophageal reflux disease)     Hyperlipidemia     Recurrent upper respiratory infection (URI)     Sleep apnea      Past Surgical History:   Procedure Laterality Date    APPENDECTOMY      5 years old  "   COLONOSCOPY N/A 9/22/2023    Procedure: COLONOSCOPY;  Surgeon: Hans Davis MD;  Location: [facility] ENDO (2ND FLR);  Service: Endoscopy;  Laterality: N/A;    COSMETIC SURGERY      EYE SURGERY      Lasik    OPEN REDUCTION AND INTERNAL FIXATION (ORIF) OF FRACTURE OF CALCANEUS Right 11/05/2021    Procedure: ORIF, FRACTURE, CALCANEUS;  Surgeon: Nichole Ortiz MD;  [facility];  Service: Orthopedics;  Laterality: Right;    VITRECTOMY       Review of patient's allergies indicates:   Allergen Reactions    Erythromycin Other (See Comments)     Causes "stomach burning"     Social History     Social History Narrative    Not on file     Family History   Problem Relation Name Age of Onset    Hypertension Mother      Stroke Father  60    Heart disease Father      Asthma Brother      Heart disease Brother  66    Prostate cancer Neg Hx      Kidney disease Neg Hx           Current Outpatient Medications:     aspirin (ECOTRIN) 81 MG EC tablet, Take 81 mg by mouth once daily., Disp: , Rfl:     atorvastatin (LIPITOR) 40 MG tablet, Take 1 tablet (40 mg total) by mouth every evening., Disp: 90 tablet, Rfl: 3    cetirizine (ZYRTEC) 10 MG tablet, Take 1 tablet (10 mg total) by mouth once daily., Disp: 30 tablet, Rfl: 0    ezetimibe (ZETIA) 10 mg tablet, Take 1 tablet (10 mg total) by mouth once daily., Disp: 90 tablet, Rfl: 3    finasteride (PROSCAR) 5 mg tablet, Take 1 tablet (5 mg total) by mouth every evening., Disp: 90 tablet, Rfl: 3    guaiFENesin (MUCINEX) 600 mg 12 hr tablet, Take 2 tablets (1,200 mg total) by mouth 2 (two) times daily. for 10 days, Disp: 40 tablet, Rfl: 0    icosapent ethyL (VASCEPA) 1 gram Cap, Take 1 capsule (1 g total) by mouth every evening., Disp: 90 capsule, Rfl: 3    omeprazole (PRILOSEC) 20 MG capsule, Take 1 capsule (20 mg total) by mouth every evening., Disp: 90 capsule, Rfl: 3    paroxetine (PAXIL) 10 MG tablet, Take 1 tablet (10 mg total) by mouth every evening., Disp: 90 tablet, Rfl: " "3    promethazine-dextromethorphan (PROMETHAZINE-DM) 6.25-15 mg/5 mL Syrp, Take 5 mLs by mouth every 6 (six) hours as needed., Disp: 180 mL, Rfl: 0    psyllium husk (METAMUCIL) 0.4 gram Cap, Take 0.4 g by mouth once daily., Disp: 30 capsule, Rfl: 0    tretinoin (RETIN-A) 0.1 % cream, Apply topically every evening. (Patient not taking: Reported on 6/13/2024), Disp: , Rfl:     OBJECTIVE:      Vital Signs (Most Recent):  Vitals:    06/20/24 1344   Weight: 98.6 kg (217 lb 6 oz)   Height: 5' 6" (1.676 m)     Body mass index is 35.09 kg/m².    Physical Exam    General: No acute distress. Well-developed. Well-nourished.  Skin: No suspicious lesions.  HENT: Normocephalic. Atraumatic.  Eyes: EOMI. Normal conjunctivae.  Cardiovascular: Regular rate. Regular rhythm.  Respiratory: Normal respiratory effort.  Musculoskeletal: No  obvious deformity. Pain in left thumb joint.  Neurological: Alert & oriented x3.  Psychiatric: Normal mood. Normal affect.         Left Hand/Wrist Examination:    Observation/Inspection:  Swelling  none    Deformity  none  Discoloration  none     Scars   none    Atrophy  none    HAND/WRIST EXAMINATION:  Finkelstein's Test   Neg  WHAT Test    Neg  Snuff box tenderness   Neg  Manzo's Test    Neg  Hook of Hamate Tenderness  Neg  CMC grind    positive  Circumduction test   positive    Neurovascular Exam:  Digits WWP, brisk CR < 3s throughout  NVI motor/LTS to M/R/U nerves, radial pulse 2+  Tinel's Test - Carpal Tunnel  Neg  Tinel's Test - Cubital Tunnel  Neg  Phalen's Test    Neg  Median Nerve Compression Test Neg    ROM hand full, painless    ROM wrist full, painless    ROM elbow full, painless    Abdomen not guarded  Respirations nonlabored  Perfusion intact    Diagnostic Results:     Imaging - I independently viewed the patient's imaging as well as the radiology report.  Xrays of the patient's left thumb  demonstrate no evidence of any acute fractures or dislocations with left thumb CMC arthritis  EMG - " none    ASSESSMENT/PLAN:      64 y.o. yo male with left thumb CMC arthritis  Plan: The patient and I had a thorough discussion today.  We discussed the working diagnosis as well as several other potential alternative diagnoses.  Treatment options were discussed, both conservative and surgical.  Conservative treatment options would include things such as activity modifications, workplace modifications, a period of rest, oral vs topical OTC and prescription anti-inflammatory medications, occupational therapy, splinting/bracing, immobilization, corticosteroid injections, and others.  Surgical options were discussed as well.     Assessment & Plan    - The patient was advised to use a brace for joint stabilization and symptom alleviation.  - The patient was recommended to manage symptoms with OTC anti-inflammatory medications such as Tylenol, Advil, Aleve, and Voltaren gel.  - X-rays of the patient's left thumb (IP joint, MCP joint, and CMC joint) were reviewed and confirmed arthritis, visible cystic change at the bottom of the first metacarpal, narrowing of the CMC joint space, and bone spurs.  - Steroid injection was performed in the patient's left thumb CMC joint to manage arthritis symptoms.  - The patient consented to the steroid injection procedure.         Should the patient's symptoms worsen, persist, or fail to improve they should return for reevaluation and I would be happy to see them back anytime.        Timi Pickering M.D.    Please be aware that this note has been generated with the assistance of [employer] voice-to-text.  Please excuse any spelling or grammatical errors.    Thank you for choosing Dr. Timi Pickering for your orthopedic hand and upper extremity care. It is our goal to provide you with exceptional care that will help keep you healthy, active, and get you back in the game.     If you felt that you received exemplary care today, please consider leaving feedback for Dr. Pickering on [employer] at  [email or web address].    Please do not hesitate to reach out to us via email, phone, or MyChart with any questions, concerns, or feedback.

## 2024-06-20 NOTE — PROCEDURES
Small Joint Aspiration/Injection: L thumb CMC    Date/Time: 6/20/2024 2:15 PM    Performed by: Timi Pickering MD  Authorized by: Timi Pickering MD    Consent Done?:  Yes (Verbal)  Indications:  Pain  Site marked: the procedure site was marked    Timeout: prior to procedure the correct patient, procedure, and site was verified    Prep: patient was prepped and draped in usual sterile fashion      Local anesthesia used?: Yes    Local anesthetic:  Topical anesthetic  Location:  Thumb  Site:  L thumb CMC  Ultrasonic guidance for needle placement?: No    Needle size:  25 G  Approach:  Dorsal  Medications:  40 mg triamcinolone acetonide 40 mg/mL  Patient tolerance:  Patient tolerated the procedure well with no immediate complications

## 2024-06-24 ENCOUNTER — HOSPITAL ENCOUNTER (OUTPATIENT)
Dept: PULMONOLOGY | Facility: HOSPITAL | Age: 64
Discharge: HOME | End: 2024-06-24
Attending: ALLERGY & IMMUNOLOGY
Payer: MEDICARE

## 2024-06-24 DIAGNOSIS — R05.3 CHRONIC COUGH: ICD-10-CM

## 2024-06-24 LAB
A ALTERNATA IGE QN: <0.1 KU/L
A FUMIGATUS IGE QN: <0.1 KU/L
ALLERGEN CHAETOMIUM GLOBOSUM IGE: <0.1 KU/L
ALLERGEN WHITE PINE TREE IGE: <0.1 KU/L
BAHIA GRASS IGE QN: 0.11 KU/L
BALD CYPRESS IGE QN: <0.1 KU/L
BERMUDA GRASS IGE QN: 0.1 KU/L
C HERBARUM IGE QN: <0.1 KU/L
C LUNATA IGE QN: <0.1 KU/L
CAT DANDER IGE QN: 0.15 KU/L
CHAETOMIUM GLOB. CLASS: NORMAL
COMMON RAGWEED IGE QN: <0.1 KU/L
COTTONWOOD IGE QN: <0.1 KU/L
D FARINAE IGE QN: <0.1 KU/L
D PTERONYSS IGE QN: <0.1 KU/L
DEPRECATED A ALTERNATA IGE RAST QL: NORMAL
DEPRECATED A FUMIGATUS IGE RAST QL: NORMAL
DEPRECATED BAHIA GRASS IGE RAST QL: ABNORMAL
DEPRECATED BALD CYPRESS IGE RAST QL: NORMAL
DEPRECATED BERMUDA GRASS IGE RAST QL: NORMAL
DEPRECATED C HERBARUM IGE RAST QL: NORMAL
DEPRECATED C LUNATA IGE RAST QL: NORMAL
DEPRECATED CAT DANDER IGE RAST QL: ABNORMAL
DEPRECATED COMMON RAGWEED IGE RAST QL: NORMAL
DEPRECATED COTTONWOOD IGE RAST QL: NORMAL
DEPRECATED D FARINAE IGE RAST QL: NORMAL
DEPRECATED D PTERONYSS IGE RAST QL: NORMAL
DEPRECATED DOG DANDER IGE RAST QL: NORMAL
DEPRECATED ELDER IGE RAST QL: NORMAL
DEPRECATED ENGL PLANTAIN IGE RAST QL: NORMAL
DEPRECATED HORSE DANDER IGE RAST QL: NORMAL
DEPRECATED JOHNSON GRASS IGE RAST QL: NORMAL
DEPRECATED LONDON PLANE IGE RAST QL: NORMAL
DEPRECATED MUGWORT IGE RAST QL: NORMAL
DEPRECATED P NOTATUM IGE RAST QL: NORMAL
DEPRECATED PECAN/HICK TREE IGE RAST QL: NORMAL
DEPRECATED ROACH IGE RAST QL: NORMAL
DEPRECATED S ROSTRATA IGE RAST QL: NORMAL
DEPRECATED SALTWORT IGE RAST QL: NORMAL
DEPRECATED SILVER BIRCH IGE RAST QL: NORMAL
DEPRECATED TIMOTHY IGE RAST QL: NORMAL
DEPRECATED WHITE OAK IGE RAST QL: NORMAL
DEPRECATED WILLOW IGE RAST QL: NORMAL
DOG DANDER IGE QN: <0.1 KU/L
ELDER IGE QN: <0.1 KU/L
ENGL PLANTAIN IGE QN: <0.1 KU/L
HORSE DANDER IGE QN: <0.1 KU/L
IGG1 SER-MCNC: 365 MG/DL (ref 382–929)
IGG2 SER-MCNC: 200 MG/DL (ref 242–700)
IGG3 SER-MCNC: 32 MG/DL (ref 22–176)
IGG4 SER-MCNC: 26 MG/DL (ref 4–86)
JOHNSON GRASS IGE QN: 0.1 KU/L
LONDON PLANE IGE QN: <0.1 KU/L
MUGWORT IGE QN: <0.1 KU/L
P NOTATUM IGE QN: <0.1 KU/L
PECAN/HICK TREE IGE QN: <0.1 KU/L
ROACH IGE QN: <0.1 KU/L
S ROSTRATA IGE QN: <0.1 KU/L
SALTWORT IGE QN: <0.1 KU/L
SILVER BIRCH IGE QN: <0.1 KU/L
TIMOTHY IGE QN: <0.1 KU/L
WHITE OAK IGE QN: <0.1 KU/L
WHITE PINE CLASS: NORMAL
WILLOW IGE QN: <0.1 KU/L

## 2024-06-24 PROCEDURE — 94060 EVALUATION OF WHEEZING: CPT | Mod: PBBFAC | Performed by: INTERNAL MEDICINE

## 2024-06-26 LAB
FEF 25 75 LLN: 1.12
FEF 25 75 PRE REF: 195.5 %
FEF 25 75 REF: 2.43
FET100 CHG: 2.4 %
FEV05 LLN: 1.22
FEV05 REF: 2.35
FEV1 CHG: 1.9 %
FEV1 FVC LLN: 65
FEV1 FVC PRE REF: 114 %
FEV1 FVC REF: 77
FEV1 LLN: 2.19
FEV1 PRE REF: 114.9 %
FEV1 REF: 2.97
FEV1 VOL CHG: 0.07
FVC CHG: 0.2 %
FVC LLN: 2.89
FVC PRE REF: 100.7 %
FVC REF: 3.84
FVC VOL CHG: 0.01
PEF LLN: 5.89
PEF PRE REF: 162.1 %
PEF REF: 7.95
PHYSICIAN COMMENT: ABNORMAL
POST FEF 25 75: 5.27 L/S (ref 1.12–4.24)
POST FET 100: 6.89 SEC
POST FEV1 FVC: 89.72 % (ref 64.53–88.67)
POST FEV1: 3.48 L (ref 2.19–3.7)
POST FEV5: 3.01 L (ref 1.22–3.49)
POST FVC: 3.88 L (ref 2.89–4.81)
POST PEF: 12.41 L/S (ref 5.89–10.01)
PRE FEF 25 75: 4.75 L/S (ref 1.12–4.24)
PRE FET 100: 6.73 SEC
PRE FEV05 REF: 121.1 %
PRE FEV1 FVC: 88.19 % (ref 64.53–88.67)
PRE FEV1: 3.41 L (ref 2.19–3.7)
PRE FEV5: 2.85 L (ref 1.22–3.49)
PRE FVC: 3.87 L (ref 2.89–4.81)
PRE PEF: 12.88 L/S (ref 5.89–10.01)

## 2024-06-29 LAB
MISCELLANEOUS TEST NAME: NORMAL
REFERENCE LAB: NORMAL
SPECIMEN TYPE: NORMAL
TEST RESULT: NORMAL

## 2024-07-15 ENCOUNTER — PATIENT MESSAGE (OUTPATIENT)
Dept: ALLERGY | Facility: CLINIC | Age: 64
End: 2024-07-15
Payer: MEDICARE

## 2024-07-18 ENCOUNTER — LAB VISIT (OUTPATIENT)
Dept: LAB | Facility: HOSPITAL | Age: 64
End: 2024-07-18
Attending: INTERNAL MEDICINE
Payer: MEDICARE

## 2024-07-18 DIAGNOSIS — E78.49 OTHER HYPERLIPIDEMIA: ICD-10-CM

## 2024-07-18 DIAGNOSIS — K21.9 GASTROESOPHAGEAL REFLUX DISEASE WITHOUT ESOPHAGITIS: ICD-10-CM

## 2024-07-18 DIAGNOSIS — E66.01 SEVERE OBESITY (BMI 35.0-39.9) WITH COMORBIDITY: ICD-10-CM

## 2024-07-18 DIAGNOSIS — N40.0 BENIGN PROSTATIC HYPERPLASIA, UNSPECIFIED WHETHER LOWER URINARY TRACT SYMPTOMS PRESENT: ICD-10-CM

## 2024-07-18 DIAGNOSIS — Z82.49 FAMILY HISTORY OF EARLY CAD: ICD-10-CM

## 2024-07-18 DIAGNOSIS — R73.01 IFG (IMPAIRED FASTING GLUCOSE): ICD-10-CM

## 2024-07-18 DIAGNOSIS — E78.00 PURE HYPERCHOLESTEROLEMIA: ICD-10-CM

## 2024-07-18 DIAGNOSIS — F41.9 ANXIETY: ICD-10-CM

## 2024-07-18 DIAGNOSIS — G47.33 OBSTRUCTIVE SLEEP APNEA SYNDROME: ICD-10-CM

## 2024-07-18 DIAGNOSIS — Z09 FOLLOW-UP EXAM, 7 MONTHS TO 1 YEAR SINCE PREVIOUS EXAM: ICD-10-CM

## 2024-07-18 LAB
ALBUMIN SERPL BCP-MCNC: 3.8 G/DL (ref 3.5–5.2)
ALP SERPL-CCNC: 95 U/L (ref 55–135)
ALT SERPL W/O P-5'-P-CCNC: 40 U/L (ref 10–44)
ANION GAP SERPL CALC-SCNC: 8 MMOL/L (ref 8–16)
AST SERPL-CCNC: 27 U/L (ref 10–40)
BASOPHILS # BLD AUTO: 0.04 K/UL (ref 0–0.2)
BASOPHILS NFR BLD: 0.6 % (ref 0–1.9)
BILIRUB SERPL-MCNC: 1 MG/DL (ref 0.1–1)
BUN SERPL-MCNC: 25 MG/DL (ref 8–23)
CALCIUM SERPL-MCNC: 9.4 MG/DL (ref 8.7–10.5)
CHLORIDE SERPL-SCNC: 111 MMOL/L (ref 95–110)
CHOLEST SERPL-MCNC: 134 MG/DL (ref 120–199)
CHOLEST/HDLC SERPL: 2.6 {RATIO} (ref 2–5)
CO2 SERPL-SCNC: 23 MMOL/L (ref 23–29)
COMPLEXED PSA SERPL-MCNC: 2 NG/ML (ref 0–4)
CREAT SERPL-MCNC: 1.2 MG/DL (ref 0.5–1.4)
DIFFERENTIAL METHOD BLD: NORMAL
EOSINOPHIL # BLD AUTO: 0.1 K/UL (ref 0–0.5)
EOSINOPHIL NFR BLD: 1.8 % (ref 0–8)
ERYTHROCYTE [DISTWIDTH] IN BLOOD BY AUTOMATED COUNT: 13.6 % (ref 11.5–14.5)
EST. GFR  (NO RACE VARIABLE): >60 ML/MIN/1.73 M^2
ESTIMATED AVG GLUCOSE: 105 MG/DL (ref 68–131)
GLUCOSE SERPL-MCNC: 103 MG/DL (ref 70–110)
HBA1C MFR BLD: 5.3 % (ref 4–5.6)
HCT VFR BLD AUTO: 45.7 % (ref 40–54)
HDLC SERPL-MCNC: 51 MG/DL (ref 40–75)
HDLC SERPL: 38.1 % (ref 20–50)
HGB BLD-MCNC: 15.4 G/DL (ref 14–18)
IMM GRANULOCYTES # BLD AUTO: 0.03 K/UL (ref 0–0.04)
IMM GRANULOCYTES NFR BLD AUTO: 0.5 % (ref 0–0.5)
LDLC SERPL CALC-MCNC: 69.8 MG/DL (ref 63–159)
LYMPHOCYTES # BLD AUTO: 1.2 K/UL (ref 1–4.8)
LYMPHOCYTES NFR BLD: 19 % (ref 18–48)
MCH RBC QN AUTO: 30.1 PG (ref 27–31)
MCHC RBC AUTO-ENTMCNC: 33.7 G/DL (ref 32–36)
MCV RBC AUTO: 89 FL (ref 82–98)
MONOCYTES # BLD AUTO: 0.5 K/UL (ref 0.3–1)
MONOCYTES NFR BLD: 7.6 % (ref 4–15)
NEUTROPHILS # BLD AUTO: 4.4 K/UL (ref 1.8–7.7)
NEUTROPHILS NFR BLD: 70.5 % (ref 38–73)
NONHDLC SERPL-MCNC: 83 MG/DL
NRBC BLD-RTO: 0 /100 WBC
PLATELET # BLD AUTO: 225 K/UL (ref 150–450)
PMV BLD AUTO: 11.5 FL (ref 9.2–12.9)
POTASSIUM SERPL-SCNC: 4.2 MMOL/L (ref 3.5–5.1)
PROT SERPL-MCNC: 6.6 G/DL (ref 6–8.4)
RBC # BLD AUTO: 5.12 M/UL (ref 4.6–6.2)
SODIUM SERPL-SCNC: 142 MMOL/L (ref 136–145)
T4 FREE SERPL-MCNC: 0.82 NG/DL (ref 0.71–1.51)
TRIGL SERPL-MCNC: 66 MG/DL (ref 30–150)
TSH SERPL DL<=0.005 MIU/L-ACNC: 1.44 UIU/ML (ref 0.4–4)
WBC # BLD AUTO: 6.2 K/UL (ref 3.9–12.7)

## 2024-07-18 PROCEDURE — 85025 COMPLETE CBC W/AUTO DIFF WBC: CPT | Performed by: FAMILY MEDICINE

## 2024-07-18 PROCEDURE — 83695 ASSAY OF LIPOPROTEIN(A): CPT | Performed by: INTERNAL MEDICINE

## 2024-07-18 PROCEDURE — 80053 COMPREHEN METABOLIC PANEL: CPT | Performed by: INTERNAL MEDICINE

## 2024-07-18 PROCEDURE — 84443 ASSAY THYROID STIM HORMONE: CPT | Performed by: FAMILY MEDICINE

## 2024-07-18 PROCEDURE — 83036 HEMOGLOBIN GLYCOSYLATED A1C: CPT | Performed by: FAMILY MEDICINE

## 2024-07-18 PROCEDURE — 84153 ASSAY OF PSA TOTAL: CPT | Performed by: FAMILY MEDICINE

## 2024-07-18 PROCEDURE — 84439 ASSAY OF FREE THYROXINE: CPT | Performed by: FAMILY MEDICINE

## 2024-07-18 PROCEDURE — 36415 COLL VENOUS BLD VENIPUNCTURE: CPT | Mod: PO | Performed by: INTERNAL MEDICINE

## 2024-07-18 PROCEDURE — 80061 LIPID PANEL: CPT | Performed by: INTERNAL MEDICINE

## 2024-07-23 LAB — LPA SERPL-MCNC: 13 MG/DL (ref 0–30)

## 2024-07-26 NOTE — PROGRESS NOTES
The patient location is: home  The chief complaint leading to consultation is: GERD, cough    Visit type: audiovisual    Face to Face time with patient: 10 minutes  20 minutes of total time spent on the encounter, which includes face to face time and non-face to face time preparing to see the patient (eg, review of tests), Obtaining and/or reviewing separately obtained history, Documenting clinical information in the electronic or other health record, Independently interpreting results (not separately reported) and communicating results to the patient/family/caregiver, or Care coordination (not separately reported).     Each patient to whom he or she provides medical services by telemedicine is:  (1) informed of the relationship between the physician and patient and the respective role of any other health care provider with respect to management of the patient; and (2) notified that he or she may decline to receive medical services by telemedicine and may withdraw from such care at any time.                                                                                 Gastroenterology Progress Note    Reason for Visit:  The primary encounter diagnosis was Gastroesophageal reflux disease without esophagitis. A diagnosis of Cough, unspecified type was also pertinent to this visit.    PCP:   Nick Whittaker.         Initial HPI   This is a 64 y.o. male presenting for GERD. Was taking Omeprazole 20mg once daily that has worked for him for many years. This worked to control his symptoms. Patient reports that on May 22nd, he developed a cough and tickling in his throat. Tried Tessalon perles, but this did not help him. Went to  and was given Zyrtec, steroids, etc, however, his cough persisted. He reports that on June 13th he saw an allergist and had many allergy tests performed which were all negative. Because of this, his Omeprazole was increased about a month ago to 40mg BID which helped relieve his symptoms.  "Patient states that he was diagnosed with GERD in 2001. Last EGD done 2014 that noted a hiatal hernia. He will send this report through the portal. Denies odynophagia, dysphagia, nausea, unintentional weight loss.      ROS:  Review of Systems   Constitutional:  Negative for chills, fever and weight loss.   Eyes:  Negative for redness.   Respiratory:  Positive for cough. Negative for sputum production and wheezing.    Cardiovascular:  Negative for chest pain.   Gastrointestinal:  Positive for heartburn. Negative for abdominal pain, blood in stool, constipation, diarrhea, melena, nausea and vomiting.   Skin:  Negative for rash.   Neurological:  Negative for seizures, loss of consciousness and weakness.        Medical History:  has a past medical history of Fatty liver, GERD (gastroesophageal reflux disease), Hyperlipidemia, Recurrent upper respiratory infection (URI), and Sleep apnea.    Surgical History:  has a past surgical history that includes Appendectomy; Cosmetic surgery; Vitrectomy; Open reduction and internal fixation (ORIF) of fracture of calcaneus (Right, 11/05/2021); Eye surgery; and Colonoscopy (N/A, 9/22/2023).    Family History: family history includes Asthma in his brother; Heart disease in his father; Heart disease (age of onset: 66) in his brother; Hypertension in his mother; Stroke (age of onset: 60) in his father..       Review of patient's allergies indicates:   Allergen Reactions    Erythromycin Other (See Comments)     Causes "stomach burning"       Current Outpatient Medications on File Prior to Visit   Medication Sig Dispense Refill    aspirin (ECOTRIN) 81 MG EC tablet Take 81 mg by mouth once daily.      atorvastatin (LIPITOR) 40 MG tablet Take 1 tablet (40 mg total) by mouth every evening. 90 tablet 3    cetirizine (ZYRTEC) 10 MG tablet Take 1 tablet (10 mg total) by mouth once daily. 30 tablet 0    ezetimibe (ZETIA) 10 mg tablet Take 1 tablet (10 mg total) by mouth once daily. 90 tablet 3 " "   finasteride (PROSCAR) 5 mg tablet Take 1 tablet (5 mg total) by mouth every evening. 90 tablet 3    icosapent ethyL (VASCEPA) 1 gram Cap Take 1 capsule (1 g total) by mouth every evening. 90 capsule 3    omeprazole (PRILOSEC) 20 MG capsule Take 1 capsule (20 mg total) by mouth every evening. 90 capsule 3    paroxetine (PAXIL) 10 MG tablet Take 1 tablet (10 mg total) by mouth every evening. 90 tablet 3    promethazine-dextromethorphan (PROMETHAZINE-DM) 6.25-15 mg/5 mL Syrp Take 5 mLs by mouth every 6 (six) hours as needed. 180 mL 0    psyllium husk (METAMUCIL) 0.4 gram Cap Take 0.4 g by mouth once daily. 30 capsule 0    tretinoin (RETIN-A) 0.1 % cream Apply topically every evening. (Patient not taking: Reported on 6/13/2024)       No current facility-administered medications on file prior to visit.         Objective Findings:    Vital Signs:  There were no vitals taken for this visit.  There is no height or weight on file to calculate BMI.    Physical Exam: Limited due to virtual visit  Physical Exam  Neurological:      Mental Status: He is alert.             Labs:  Lab Results   Component Value Date    WBC 6.20 07/18/2024    HGB 15.4 07/18/2024    HCT 45.7 07/18/2024     07/18/2024    CHOL 134 07/18/2024    TRIG 66 07/18/2024    HDL 51 07/18/2024    ALKPHOS 95 07/18/2024    ALT 40 07/18/2024    AST 27 07/18/2024     07/18/2024    K 4.2 07/18/2024     (H) 07/18/2024    CREATININE 1.2 07/18/2024    BUN 25 (H) 07/18/2024    CO2 23 07/18/2024    TSH 1.440 07/18/2024    PSA 2.7 05/26/2023    INR 1.0 09/17/2023    HGBA1C 5.3 07/18/2024       No results found for: "CDIFFICILEAN"  No results found for: "CDIFFTOX"    Imaging reviewed: No pertinent imaging reviewed      Endoscopy reviewed: Colonoscopy 9/22/2023  Impression:            - Preparation of the colon was fair.                          - Hemorrhoids found on perianal exam. with fresh                          blood coming from hemorrhoids.       "                    - Diverticulosis in the entire examined colon.                          - One 6 mm polyp in the transverse colon, removed                          with a cold snare. Resected and retrieved.                          - Non-thrombosed external and internal hemorrhoids.   Recommendation:        - Return patient to hospital [facility] for ongoing care.                          - High fiber diet.                          - Use original regular Metamucil one tablespoon PO                          daily.                          - Anusol (pramoxine) cream: Apply externally BID                          for 7 days.                          - Outpatient referral to Colorectal surgery for                          management of hemorrhoids.                          - Repeat colonoscopy in 7 years for surveillance.                          - Continue present medications.   Attending Participation:        I was present and participated during the entire procedure,        including non-sidhu portions.   Hans Davis MD   9/22/2023 9:38:58 AM       Assessment:  1. Gastroesophageal reflux disease without esophagitis    2. Cough, unspecified type             Recommendations:  Continue PPI BID as this is helping your symptoms. If feeling better in a month, can consider decreasing PPI back down to once daily. Add Pepcid as needed. Referral placed for EGD.  Referral for EGD for further evaluation. Can consider reflux testing, however, medication is helping patient at this time.       Thank you for allowing me to participate in this patient's care.    Sincerely,     Sharon Collier NP  Gastroenterology Department  [facility]

## 2024-07-29 ENCOUNTER — PATIENT MESSAGE (OUTPATIENT)
Dept: GASTROENTEROLOGY | Facility: CLINIC | Age: 64
End: 2024-07-29

## 2024-07-29 ENCOUNTER — OFFICE VISIT (OUTPATIENT)
Dept: GASTROENTEROLOGY | Facility: CLINIC | Age: 64
End: 2024-07-29
Payer: MEDICARE

## 2024-07-29 ENCOUNTER — TELEPHONE (OUTPATIENT)
Dept: ENDOSCOPY | Facility: HOSPITAL | Age: 64
End: 2024-07-29
Payer: MEDICARE

## 2024-07-29 DIAGNOSIS — K21.9 GASTROESOPHAGEAL REFLUX DISEASE, UNSPECIFIED WHETHER ESOPHAGITIS PRESENT: Primary | ICD-10-CM

## 2024-07-29 DIAGNOSIS — K21.9 GASTROESOPHAGEAL REFLUX DISEASE WITHOUT ESOPHAGITIS: Primary | ICD-10-CM

## 2024-07-29 DIAGNOSIS — R05.9 COUGH, UNSPECIFIED TYPE: ICD-10-CM

## 2024-07-29 PROCEDURE — 99214 OFFICE O/P EST MOD 30 MIN: CPT | Mod: 95,,,

## 2024-07-29 NOTE — PATIENT INSTRUCTIONS
For GERD/Reflux:     Take your PPI 30-45 minutes before your first protein containing meal (breakfast) every day. Take twice daily for 8 weeks. If symptoms improve ok to decrease to once daily.      Take Pepcid 20mg every evening before bedtime to help with nocturnal symptoms, as needed.     Remain upright for at least 3 hours after eating.      Elevate the head of the bed for nighttime.      Avoid foods that you have noticed make your symptoms worse (possible triggers include: peppermint, alcohol, chocolate, caffeine, spicy foods, greasy/fried foods, acidic foods-citrus).      Endo Schedulers--[phone]

## 2024-07-29 NOTE — TELEPHONE ENCOUNTER
"----- Message from Slivina Lozano sent at 2024  2:09 PM CDT -----  Regarding: FW: EGD    ----- Message -----  From: Sharon Collier NP  Sent: 2024   9:34 AM CDT  To: [facility]  Subject: EGD                                              Procedure: EGD    Diagnosis: GERD    Procedure Timin-12 weeks    #If within 4 weeks selected, please tiff as high priority#    #If greater than 12 weeks, please select "5-12 weeks" and delay sending until 3 months prior to requested date#     Location: Any Site    Additional Scheduling Information: No scheduling concerns    Prep Specifications:Standard prep    Is the patient taking a GLP-1 Agonist:no    Have you attached a patient to this message: yes  "
Spoke to pt to schedule procedure(s) Upper Endoscopy (EGD)       Physician to perform procedure(s) Dr. JAY De Jesus  Date of Procedure (s) 8/5/24  Arrival Time 11:30 AM  Time of Procedure(s) 12:30 PM   Location of Procedure(s) [facility]  Type of Rx Prep sent to patient: N/A  Instructions provided to patient via [facility]    Patient was informed on the following information and verbalized understanding. Screening questionnaire reviewed with patient and complete. If procedure requires anesthesia, a responsible adult needs to be present to accompany the patient home, patient cannot drive after receiving anesthesia. Appointment details are tentative, especially check-in time. Patient will receive a prep-op call 7 days prior to confirm check-in time for procedure. If applicable the patient should contact their pharmacy to verify Rx for procedure prep is ready for pick-up. Patient was advised to call the scheduling department at [phone] if pharmacy states no Rx is available. Patient was advised to call the endoscopy scheduling department if any questions or concerns arise.      [facility] Scheduling Department     
.

## 2024-08-01 ENCOUNTER — ANESTHESIA EVENT (OUTPATIENT)
Dept: ENDOSCOPY | Facility: HOSPITAL | Age: 64
End: 2024-08-01
Payer: MEDICARE

## 2024-08-05 ENCOUNTER — ANESTHESIA (OUTPATIENT)
Dept: ENDOSCOPY | Facility: HOSPITAL | Age: 64
End: 2024-08-05
Payer: MEDICARE

## 2024-08-05 ENCOUNTER — PATIENT MESSAGE (OUTPATIENT)
Dept: GASTROENTEROLOGY | Facility: CLINIC | Age: 64
End: 2024-08-05

## 2024-08-05 ENCOUNTER — HOSPITAL ENCOUNTER (OUTPATIENT)
Facility: HOSPITAL | Age: 64
Discharge: HOME | End: 2024-08-05
Attending: STUDENT IN AN ORGANIZED HEALTH CARE EDUCATION/TRAINING PROGRAM | Admitting: STUDENT IN AN ORGANIZED HEALTH CARE EDUCATION/TRAINING PROGRAM
Payer: MEDICARE

## 2024-08-05 VITALS
DIASTOLIC BLOOD PRESSURE: 84 MMHG | SYSTOLIC BLOOD PRESSURE: 142 MMHG | OXYGEN SATURATION: 95 % | TEMPERATURE: 98 F | RESPIRATION RATE: 16 BRPM | HEART RATE: 60 BPM

## 2024-08-05 DIAGNOSIS — K21.9 GERD (GASTROESOPHAGEAL REFLUX DISEASE): ICD-10-CM

## 2024-08-05 DIAGNOSIS — K21.9 GASTROESOPHAGEAL REFLUX DISEASE, UNSPECIFIED WHETHER ESOPHAGITIS PRESENT: Primary | ICD-10-CM

## 2024-08-05 DIAGNOSIS — R05.3 CHRONIC COUGH: Primary | ICD-10-CM

## 2024-08-05 PROCEDURE — 25000003 PHARM REV CODE 250: Performed by: NURSE ANESTHETIST, CERTIFIED REGISTERED

## 2024-08-05 PROCEDURE — 37000009 HC ANESTHESIA EA ADD 15 MINS: Performed by: STUDENT IN AN ORGANIZED HEALTH CARE EDUCATION/TRAINING PROGRAM

## 2024-08-05 PROCEDURE — 99900035 HC TECH TIME PER 15 MIN (STAT)

## 2024-08-05 PROCEDURE — 37000008 HC ANESTHESIA 1ST 15 MINUTES: Performed by: STUDENT IN AN ORGANIZED HEALTH CARE EDUCATION/TRAINING PROGRAM

## 2024-08-05 PROCEDURE — D9220A PRA ANESTHESIA: Mod: ,,, | Performed by: NURSE ANESTHETIST, CERTIFIED REGISTERED

## 2024-08-05 PROCEDURE — 63600175 PHARM REV CODE 636 W HCPCS: Performed by: NURSE ANESTHETIST, CERTIFIED REGISTERED

## 2024-08-05 PROCEDURE — 43239 EGD BIOPSY SINGLE/MULTIPLE: CPT | Mod: ,,, | Performed by: STUDENT IN AN ORGANIZED HEALTH CARE EDUCATION/TRAINING PROGRAM

## 2024-08-05 PROCEDURE — 88305 TISSUE EXAM BY PATHOLOGIST: CPT | Performed by: PATHOLOGY

## 2024-08-05 PROCEDURE — 27201012 HC FORCEPS, HOT/COLD, DISP: Performed by: STUDENT IN AN ORGANIZED HEALTH CARE EDUCATION/TRAINING PROGRAM

## 2024-08-05 PROCEDURE — 94761 N-INVAS EAR/PLS OXIMETRY MLT: CPT

## 2024-08-05 PROCEDURE — 43239 EGD BIOPSY SINGLE/MULTIPLE: CPT | Performed by: STUDENT IN AN ORGANIZED HEALTH CARE EDUCATION/TRAINING PROGRAM

## 2024-08-05 RX ORDER — LIDOCAINE HYDROCHLORIDE 20 MG/ML
INJECTION INTRAVENOUS
Status: DISCONTINUED | OUTPATIENT
Start: 2024-08-05 | End: 2024-08-05

## 2024-08-05 RX ORDER — PROPOFOL 10 MG/ML
VIAL (ML) INTRAVENOUS
Status: DISCONTINUED | OUTPATIENT
Start: 2024-08-05 | End: 2024-08-05

## 2024-08-05 RX ORDER — SODIUM CHLORIDE 9 MG/ML
INJECTION, SOLUTION INTRAVENOUS CONTINUOUS
Status: DISCONTINUED | OUTPATIENT
Start: 2024-08-05 | End: 2024-08-05 | Stop reason: HOSPADM

## 2024-08-05 RX ORDER — DOXYCYCLINE HYCLATE 100 MG
100 TABLET ORAL
COMMUNITY
Start: 2024-05-14

## 2024-08-05 RX ORDER — OMEPRAZOLE 40 MG/1
40 CAPSULE, DELAYED RELEASE ORAL
Qty: 60 CAPSULE | Refills: 1 | Status: SHIPPED | OUTPATIENT
Start: 2024-08-05 | End: 2024-10-04

## 2024-08-05 RX ADMIN — SODIUM CHLORIDE: 0.9 INJECTION, SOLUTION INTRAVENOUS at 12:08

## 2024-08-05 RX ADMIN — LIDOCAINE HYDROCHLORIDE 100 MG: 20 INJECTION INTRAVENOUS at 12:08

## 2024-08-05 RX ADMIN — PROPOFOL 60 MG: 10 INJECTION, EMULSION INTRAVENOUS at 12:08

## 2024-08-05 RX ADMIN — GLYCOPYRROLATE 0.2 MG: 0.2 INJECTION, SOLUTION INTRAMUSCULAR; INTRAVENOUS at 12:08

## 2024-08-07 LAB
FINAL PATHOLOGIC DIAGNOSIS: NORMAL
GROSS: NORMAL
Lab: NORMAL

## 2024-08-08 ENCOUNTER — OFFICE VISIT (OUTPATIENT)
Dept: CARDIOLOGY | Facility: CLINIC | Age: 64
End: 2024-08-08
Payer: MEDICARE

## 2024-08-08 VITALS — BODY MASS INDEX: 34.39 KG/M2 | HEIGHT: 66 IN | WEIGHT: 214 LBS

## 2024-08-08 DIAGNOSIS — E78.00 PURE HYPERCHOLESTEROLEMIA: Primary | ICD-10-CM

## 2024-08-08 DIAGNOSIS — I25.10 CORONARY ARTERY DISEASE DUE TO CALCIFIED CORONARY LESION: ICD-10-CM

## 2024-08-08 DIAGNOSIS — Z82.49 FAMILY HISTORY OF CORONARY ARTERY DISEASE: ICD-10-CM

## 2024-08-08 DIAGNOSIS — I25.84 CORONARY ARTERY DISEASE DUE TO CALCIFIED CORONARY LESION: ICD-10-CM

## 2024-08-08 PROCEDURE — 99213 OFFICE O/P EST LOW 20 MIN: CPT | Mod: 95,,, | Performed by: INTERNAL MEDICINE

## 2024-08-08 NOTE — PATIENT INSTRUCTIONS
Component      Latest Ref Rng 6/3/2022 6/9/2023 7/18/2024   Cholesterol Total      120 - 199 mg/dL 141  168  134    Triglycerides      30 - 150 mg/dL 83  77  66    HDL      >50  mg/dL 36 (L)  47  51    LDL Cholesterol      < 70 mg/dL 88.4  105.6  69.8    HDL/Cholesterol       > 30 % 25.5  28.0  38.1

## 2024-08-08 NOTE — PROGRESS NOTES
"The patient location is: Home, in [place].  The chief complaint leading to consultation is: pure hypercholesterolemia and CAD due to calcified coronary lesion    Visit type:TELE AUDIOVISUAL  Total time spent with patient: 17m 23s minutes.  Each patient to whom he or she provides medical services by telemedicine is:  (1) informed of the relationship between the physician and patient and the respective role of any other health care provider with respect to management of the patient; and (2) notified that he or she may decline to receive medical services by telemedicine and may withdraw from such care at any time.   Subjective:   Chief Complaint:  Pietro Dietz is a 64 y.o. male who presents for follow-up of pure hypercholesterolemia and CAD due to calcified coronary lesion      Problem List and HPI:   Family history of CAD  Hypercholesterolemia  CACS 227 in 2016  Gastric ulcer w bleeding 9/2023    He does not report angina or shortness of breath with exertion. Most recently LDL is approx 70 (better) and HDL/total chol is now >30%.  Very strong family h/o CAD.      Review of patient's allergies indicates:   Allergen Reactions    Erythromycin Other (See Comments)     Causes "stomach burning"        Current Outpatient Medications   Medication Sig    aspirin (ECOTRIN) 81 MG EC tablet Take 81 mg by mouth once daily.    atorvastatin (LIPITOR) 40 MG tablet Take 1 tablet (40 mg total) by mouth every evening.    doxycycline (VIBRA-TABS) 100 MG tablet Take 100 mg by mouth every Mon, Wed, Fri.    ezetimibe (ZETIA) 10 mg tablet Take 1 tablet (10 mg total) by mouth once daily.    finasteride (PROSCAR) 5 mg tablet Take 1 tablet (5 mg total) by mouth every evening.    icosapent ethyL (VASCEPA) 1 gram Cap Take 1 capsule (1 g total) by mouth every evening.    inulin (PREBIOTIC FIBER ORAL) Take by mouth once daily. Powder mixed with food daily    omeprazole (PRILOSEC) 40 MG capsule Take 1 capsule (40 mg total) by mouth 2 (two) " "times daily before meals.    paroxetine (PAXIL) 10 MG tablet Take 1 tablet (10 mg total) by mouth every evening.     No current facility-administered medications for this visit.       Social history:  Pietro Dietz  reports that he has never smoked. He has never been exposed to tobacco smoke. He has never used smokeless tobacco. He reports current alcohol use of about 3.0 standard drinks of alcohol per week. He reports that he does not use drugs.      Objective:   Ht 5' 6" (1.676 m)   Wt 97.1 kg (214 lb)   BMI 34.54 kg/m²    Physical Exam    Lipids:  Component      Latest Ref Rng 6/3/2022 6/9/2023 7/18/2024   Cholesterol Total      120 - 199 mg/dL 141  168  134    Triglycerides      30 - 150 mg/dL 83  77  66    HDL      40 - 75 mg/dL 36 (L)  47  51    LDL Cholesterol      63.0 - 159.0 mg/dL 88.4  105.6  69.8    HDL/Cholesterol Ratio      20.0 - 50.0 % 25.5  28.0  38.1         Recent Labs   Lab 07/18/24  0923   Creatinine 1.2   Potassium 4.2   CO2 23   BUN 25 H     Liver:  Recent Labs   Lab 07/18/24  0923   AST 27   ALT 40     CBC:  Lab Results   Component Value Date    WBC 6.20 07/18/2024    HGB 15.4 07/18/2024    HCT 45.7 07/18/2024    MCV 89 07/18/2024     07/18/2024         Assessment and Plan:       ICD-10-CM ICD-9-CM   1. Pure hypercholesterolemia  E78.00 272.0   2. Coronary artery disease due to calcified coronary lesion  I25.10 414.00    I25.84 414.4   3. Family history of coronary artery disease  Z82.49 V17.3        LDL goal 70. HDL/total chol excellent.   Discussed stress testing and CT-FFR.    Orders placed during this encounter:     Pure hypercholesterolemia  -     Lipid Panel; Future; Expected date: 05/10/2026    Coronary artery disease due to calcified coronary lesion  -     Comprehensive Metabolic Panel; Future; Expected date: 05/10/2026  -     EKG 12-lead; Future; Expected date: 05/10/2026    Family history of coronary artery disease         Follow up in about 18 months (around " 2/8/2026).

## 2024-08-26 ENCOUNTER — PATIENT MESSAGE (OUTPATIENT)
Dept: GASTROENTEROLOGY | Facility: CLINIC | Age: 64
End: 2024-08-26
Payer: MEDICARE

## 2024-08-26 DIAGNOSIS — R05.3 CHRONIC COUGH: ICD-10-CM

## 2024-08-27 RX ORDER — OMEPRAZOLE 40 MG/1
40 CAPSULE, DELAYED RELEASE ORAL
Qty: 60 CAPSULE | Refills: 1 | Status: SHIPPED | OUTPATIENT
Start: 2024-08-27 | End: 2024-10-26

## 2024-10-30 ENCOUNTER — PATIENT MESSAGE (OUTPATIENT)
Dept: GASTROENTEROLOGY | Facility: CLINIC | Age: 64
End: 2024-10-30
Payer: MEDICARE

## 2024-10-30 DIAGNOSIS — R05.3 CHRONIC COUGH: ICD-10-CM

## 2024-10-30 RX ORDER — OMEPRAZOLE 40 MG/1
40 CAPSULE, DELAYED RELEASE ORAL EVERY MORNING
Qty: 30 CAPSULE | Refills: 1 | Status: SHIPPED | OUTPATIENT
Start: 2024-10-30 | End: 2024-10-31

## 2024-10-31 RX ORDER — OMEPRAZOLE 40 MG/1
40 CAPSULE, DELAYED RELEASE ORAL EVERY MORNING
Qty: 90 CAPSULE | Refills: 3 | Status: SHIPPED | OUTPATIENT
Start: 2024-10-31

## 2025-01-14 ENCOUNTER — OFFICE VISIT (OUTPATIENT)
Dept: ORTHOPEDICS | Facility: CLINIC | Age: 65
End: 2025-01-14
Payer: MEDICARE

## 2025-01-14 VITALS — BODY MASS INDEX: 34.37 KG/M2 | WEIGHT: 213.88 LBS | HEIGHT: 66 IN

## 2025-01-14 DIAGNOSIS — M18.12 ARTHRITIS OF CARPOMETACARPAL (CMC) JOINT OF LEFT THUMB: Primary | ICD-10-CM

## 2025-01-14 PROCEDURE — 99999 PR PBB SHADOW E&M-EST. PATIENT-LVL III: CPT | Mod: PBBFAC,,, | Performed by: ORTHOPAEDIC SURGERY

## 2025-01-14 PROCEDURE — 20600 DRAIN/INJ JOINT/BURSA W/O US: CPT | Mod: PBBFAC,LT | Performed by: ORTHOPAEDIC SURGERY

## 2025-01-14 PROCEDURE — 99213 OFFICE O/P EST LOW 20 MIN: CPT | Mod: S$PBB,25,, | Performed by: ORTHOPAEDIC SURGERY

## 2025-01-14 PROCEDURE — 99999PBSHW PR PBB SHADOW TECHNICAL ONLY FILED TO HB: Mod: PBBFAC,,,

## 2025-01-14 PROCEDURE — 99213 OFFICE O/P EST LOW 20 MIN: CPT | Mod: PBBFAC | Performed by: ORTHOPAEDIC SURGERY

## 2025-01-14 RX ORDER — TRIAMCINOLONE ACETONIDE 40 MG/ML
40 INJECTION, SUSPENSION INTRA-ARTICULAR; INTRAMUSCULAR
Status: DISCONTINUED | OUTPATIENT
Start: 2025-01-14 | End: 2025-01-14 | Stop reason: HOSPADM

## 2025-01-14 RX ADMIN — TRIAMCINOLONE ACETONIDE 40 MG: 40 INJECTION, SUSPENSION INTRA-ARTICULAR; INTRAMUSCULAR at 02:01

## 2025-01-14 NOTE — PROCEDURES
Small Joint Aspiration/Injection: L thumb CMC    Date/Time: 1/14/2025 2:15 PM    Performed by: Timi Pickering MD  Authorized by: Timi Pickering MD    Consent Done?:  Yes (Verbal)  Indications:  Pain  Site marked: the procedure site was marked    Timeout: prior to procedure the correct patient, procedure, and site was verified    Prep: patient was prepped and draped in usual sterile fashion      Local anesthesia used?: Yes    Local anesthetic:  Topical anesthetic  Location:  Thumb  Site:  L thumb CMC  Ultrasonic guidance for needle placement?: No    Needle size:  25 G  Approach:  Dorsal  Medications:  40 mg triamcinolone acetonide 40 mg/mL  Patient tolerance:  Patient tolerated the procedure well with no immediate complications

## 2025-01-14 NOTE — PROGRESS NOTES
[facility]  History & Physical  Orthopedics     SUBJECTIVE:       History of Present Illness    CHIEF COMPLAINT:  The patient came in today for an initial consultation regarding persistent pain in the left thumb, suspected to be arthritis, which has been ongoing for over a year.     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 The pain is located in the carpometacarpal (CMC) joint of the thumb, intensifies upon pressure, and sometimes feels like the thumb pops out of joint. The pain is specific to the left thumb, with no similar symptoms reported in the right hand. A week ago, the patient took steroid pills for his lungs, which temporarily reduced the thumb pain. However, the pain is now gradually returning. The patient also had a sinus infection recently. Recent x-rays confirmed the presence of arthritis in the CMC joint of the left thumb. The patient expressed disappointment at the diagnosis, as he was hoping to avoid arthritis. The patient denies any pain or discomfort in the right hand and denies any other medical diagnoses.     PREVIOUS TREATMENTS:  Patient reports pain in his left thumb for over a year. A week ago, he took steroid pills for his lungs, which temporarily reduced the thumb pain. However, the pain is now gradually returning.     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     Interval history January 14, 2025: The patient returns today for re-evaluation.  Notes that his left thumb CMC arthritis continues to be painful.  Unfortunately, he notes he did not really get much relief from his prior injection.  He is interested in attempting another injection today.  No other complaints.    ROS:  General: -fever, -chills  Gastrointestinal: -nausea, -vomiting,  "-constipation, -diarrhea  Musculoskeletal: +joint pain               Past Medical History:   Diagnosis Date    Fatty liver      GERD (gastroesophageal reflux disease)      Hyperlipidemia      Recurrent upper respiratory infection (URI)      Sleep apnea              Past Surgical History:   Procedure Laterality Date    APPENDECTOMY         5 years old    COLONOSCOPY N/A 9/22/2023     Procedure: COLONOSCOPY;  Surgeon: Hans Davis MD;  Location: [facility] (2ND FLR);  Service: Endoscopy;  Laterality: N/A;    COSMETIC SURGERY        EYE SURGERY         Lasik    OPEN REDUCTION AND INTERNAL FIXATION (ORIF) OF FRACTURE OF CALCANEUS Right 11/05/2021     Procedure: ORIF, FRACTURE, CALCANEUS;  Surgeon: Nichole Ortiz MD;  [facility];  Service: Orthopedics;  Laterality: Right;    VITRECTOMY                Review of patient's allergies indicates:   Allergen Reactions    Erythromycin Other (See Comments)       Causes "stomach burning"      Social History          Social History Narrative    Not on file             Family History   Problem Relation Name Age of Onset    Hypertension Mother        Stroke Father   60    Heart disease Father        Asthma Brother        Heart disease Brother   66    Prostate cancer Neg Hx        Kidney disease Neg Hx               Current Medications      Current Outpatient Medications:     aspirin (ECOTRIN) 81 MG EC tablet, Take 81 mg by mouth once daily., Disp: , Rfl:     atorvastatin (LIPITOR) 40 MG tablet, Take 1 tablet (40 mg total) by mouth every evening., Disp: 90 tablet, Rfl: 3    cetirizine (ZYRTEC) 10 MG tablet, Take 1 tablet (10 mg total) by mouth once daily., Disp: 30 tablet, Rfl: 0    ezetimibe (ZETIA) 10 mg tablet, Take 1 tablet (10 mg total) by mouth once daily., Disp: 90 tablet, Rfl: 3    finasteride (PROSCAR) 5 mg tablet, Take 1 tablet (5 mg total) by mouth every evening., Disp: 90 tablet, Rfl: 3    guaiFENesin (MUCINEX) 600 mg 12 hr tablet, Take 2 tablets (1,200 mg " "total) by mouth 2 (two) times daily. for 10 days, Disp: 40 tablet, Rfl: 0    icosapent ethyL (VASCEPA) 1 gram Cap, Take 1 capsule (1 g total) by mouth every evening., Disp: 90 capsule, Rfl: 3    omeprazole (PRILOSEC) 20 MG capsule, Take 1 capsule (20 mg total) by mouth every evening., Disp: 90 capsule, Rfl: 3    paroxetine (PAXIL) 10 MG tablet, Take 1 tablet (10 mg total) by mouth every evening., Disp: 90 tablet, Rfl: 3    promethazine-dextromethorphan (PROMETHAZINE-DM) 6.25-15 mg/5 mL Syrp, Take 5 mLs by mouth every 6 (six) hours as needed., Disp: 180 mL, Rfl: 0    psyllium husk (METAMUCIL) 0.4 gram Cap, Take 0.4 g by mouth once daily., Disp: 30 capsule, Rfl: 0    tretinoin (RETIN-A) 0.1 % cream, Apply topically every evening. (Patient not taking: Reported on 6/13/2024), Disp: , Rfl:         OBJECTIVE:       Vital Signs (Most Recent):  Vitals       Vitals:     06/20/24 1344   Weight: 98.6 kg (217 lb 6 oz)   Height: 5' 6" (1.676 m)         Body mass index is 35.09 kg/m².     Physical Exam    General: No acute distress. Well-developed. Well-nourished.  Skin: No suspicious lesions.  HENT: Normocephalic. Atraumatic.  Eyes: EOMI. Normal conjunctivae.  Cardiovascular: Regular rate. Regular rhythm.  Respiratory: Normal respiratory effort.  Musculoskeletal: No  obvious deformity. Pain in left thumb joint.  Neurological: Alert & oriented x3.  Psychiatric: Normal mood. Normal affect.          Left Hand/Wrist Examination:     Observation/Inspection:  Swelling                       none                  Deformity                     none  Discoloration               none                  Scars                           none                  Atrophy                        none     HAND/WRIST EXAMINATION:  Finkelstein's Test                                Neg  WHAT Test                                         Neg  Snuff box tenderness                          Neg  Manzo's Test                                     Neg  Hook of " Hamate Tenderness              Neg  CMC grind                                           positive  Circumduction test                              positive     Neurovascular Exam:  Digits WWP, brisk CR < 3s throughout  NVI motor/LTS to M/R/U nerves, radial pulse 2+  Tinel's Test - Carpal Tunnel                Neg  Tinel's Test - Cubital Tunnel               Neg  Phalen's Test                                      Neg  Median Nerve Compression TestNeg     ROM hand full, painless     ROM wrist full, painless    ROM elbow full, painless     Abdomen not guarded  Respirations nonlabored  Perfusion intact     Diagnostic Results:     Imaging - I independently viewed the patient's imaging as well as the radiology report.  Xrays of the patient's left thumb  demonstrate no evidence of any acute fractures or dislocations with left thumb CMC arthritis  EMG - none     ASSESSMENT/PLAN:       64 y.o. yo male with left thumb CMC arthritis  Plan: The patient and I had a thorough discussion today.  We discussed the working diagnosis as well as several other potential alternative diagnoses.  Treatment options were discussed, both conservative and surgical.  Conservative treatment options would include things such as activity modifications, workplace modifications, a period of rest, oral vs topical OTC and prescription anti-inflammatory medications, occupational therapy, splinting/bracing, immobilization, corticosteroid injections, and others.  Surgical options were discussed as well.      Assessment & Plan    -   At this point in time, the patient would like to attempt a repeat corticosteroid injection to left thumb CMC joint which will be administered today.  He will follow up on an as needed/if needed basis.     Should the patient's symptoms worsen, persist, or fail to improve they should return for reevaluation and I would be happy to see them back anytime.          Timi Pickering M.D.     Please be aware that this note has been  generated with the assistance of MModal voice-to-text.  Please excuse any spelling or grammatical errors.     Thank you for choosing Dr. Timi Pickering for your orthopedic hand and upper extremity care. It is our goal to provide you with exceptional care that will help keep you healthy, active, and get you back in the game.     If you felt that you received exemplary care today, please consider leaving feedback for Dr. Pickering on Modern Message at [email or web address].     Please do not hesitate to reach out to us via email, phone, or MyChart with any questions, concerns, or feedback.

## 2025-02-18 DIAGNOSIS — E78.49 OTHER HYPERLIPIDEMIA: ICD-10-CM

## 2025-02-18 DIAGNOSIS — K21.9 GASTROESOPHAGEAL REFLUX DISEASE WITHOUT ESOPHAGITIS: ICD-10-CM

## 2025-02-18 DIAGNOSIS — F41.9 ANXIETY: ICD-10-CM

## 2025-02-18 RX ORDER — FINASTERIDE 5 MG/1
5 TABLET, FILM COATED ORAL NIGHTLY
Qty: 90 TABLET | Refills: 0 | Status: SHIPPED | OUTPATIENT
Start: 2025-02-18

## 2025-02-18 RX ORDER — OMEPRAZOLE 20 MG/1
20 CAPSULE, DELAYED RELEASE ORAL NIGHTLY
Qty: 90 CAPSULE | Refills: 3 | OUTPATIENT
Start: 2025-02-18

## 2025-02-18 RX ORDER — PAROXETINE 10 MG/1
10 TABLET, FILM COATED ORAL NIGHTLY
Qty: 90 TABLET | Refills: 0 | Status: SHIPPED | OUTPATIENT
Start: 2025-02-18

## 2025-02-18 RX ORDER — ATORVASTATIN CALCIUM 40 MG/1
40 TABLET, FILM COATED ORAL NIGHTLY
Qty: 90 TABLET | Refills: 0 | Status: SHIPPED | OUTPATIENT
Start: 2025-02-18

## 2025-02-19 NOTE — TELEPHONE ENCOUNTER
No care due was identified.  Health [facility] Embedded Care Due Messages. Reference number: [id number].   2/18/2025 9:33:36 PM CST

## 2025-02-19 NOTE — TELEPHONE ENCOUNTER
Refill Decision Note   Pietro Dietz  is requesting a refill authorization.  Brief Assessment and Rationale for Refill:  Quick Discontinue  Approve     Medication Therapy Plan: Omeprazole:The original prescription was discontinued on 8/5/2024 by Balbina De Jesus MD.      Comments:     Note composed:10:34 PM 02/18/2025

## 2025-02-21 DIAGNOSIS — Z00.00 ENCOUNTER FOR MEDICARE ANNUAL WELLNESS EXAM: ICD-10-CM

## 2025-02-23 ENCOUNTER — PATIENT MESSAGE (OUTPATIENT)
Dept: INTERNAL MEDICINE | Facility: CLINIC | Age: 65
End: 2025-02-23
Payer: MEDICARE

## 2025-02-23 DIAGNOSIS — R05.3 CHRONIC COUGH: ICD-10-CM

## 2025-02-24 ENCOUNTER — OFFICE VISIT (OUTPATIENT)
Dept: INTERNAL MEDICINE | Facility: CLINIC | Age: 65
End: 2025-02-24
Payer: MEDICARE

## 2025-02-24 VITALS
HEART RATE: 86 BPM | DIASTOLIC BLOOD PRESSURE: 80 MMHG | RESPIRATION RATE: 18 BRPM | BODY MASS INDEX: 32.92 KG/M2 | SYSTOLIC BLOOD PRESSURE: 130 MMHG | HEIGHT: 66 IN | OXYGEN SATURATION: 94 % | WEIGHT: 204.81 LBS | TEMPERATURE: 99 F

## 2025-02-24 DIAGNOSIS — Z09 FOLLOW-UP EXAM, 7 MONTHS TO 1 YEAR SINCE PREVIOUS EXAM: Primary | ICD-10-CM

## 2025-02-24 DIAGNOSIS — G47.33 OBSTRUCTIVE SLEEP APNEA SYNDROME: ICD-10-CM

## 2025-02-24 DIAGNOSIS — Z12.5 PROSTATE CANCER SCREENING: ICD-10-CM

## 2025-02-24 DIAGNOSIS — N40.0 BENIGN PROSTATIC HYPERPLASIA, UNSPECIFIED WHETHER LOWER URINARY TRACT SYMPTOMS PRESENT: ICD-10-CM

## 2025-02-24 DIAGNOSIS — E78.49 OTHER HYPERLIPIDEMIA: ICD-10-CM

## 2025-02-24 DIAGNOSIS — R73.01 IFG (IMPAIRED FASTING GLUCOSE): ICD-10-CM

## 2025-02-24 DIAGNOSIS — K76.0 FATTY LIVER: ICD-10-CM

## 2025-02-24 DIAGNOSIS — E66.811 CLASS 1 OBESITY WITH SERIOUS COMORBIDITY AND BODY MASS INDEX (BMI) OF 33.0 TO 33.9 IN ADULT, UNSPECIFIED OBESITY TYPE: ICD-10-CM

## 2025-02-24 DIAGNOSIS — F41.9 ANXIETY: ICD-10-CM

## 2025-02-24 DIAGNOSIS — K21.9 GASTROESOPHAGEAL REFLUX DISEASE WITHOUT ESOPHAGITIS: ICD-10-CM

## 2025-02-24 DIAGNOSIS — R05.3 CHRONIC COUGH: ICD-10-CM

## 2025-02-24 DIAGNOSIS — I70.0 AORTIC ATHEROSCLEROSIS: ICD-10-CM

## 2025-02-24 PROCEDURE — 99214 OFFICE O/P EST MOD 30 MIN: CPT | Mod: PBBFAC,PO | Performed by: FAMILY MEDICINE

## 2025-02-24 PROCEDURE — 99999 PR PBB SHADOW E&M-EST. PATIENT-LVL IV: CPT | Mod: PBBFAC,,, | Performed by: FAMILY MEDICINE

## 2025-02-24 PROCEDURE — G2211 COMPLEX E/M VISIT ADD ON: HCPCS | Mod: S$PBB,,, | Performed by: FAMILY MEDICINE

## 2025-02-24 PROCEDURE — 99215 OFFICE O/P EST HI 40 MIN: CPT | Mod: S$PBB,,, | Performed by: FAMILY MEDICINE

## 2025-02-24 RX ORDER — PAROXETINE 10 MG/1
10 TABLET, FILM COATED ORAL NIGHTLY
Qty: 90 TABLET | Refills: 3 | Status: SHIPPED | OUTPATIENT
Start: 2025-02-24

## 2025-02-24 RX ORDER — OMEPRAZOLE 40 MG/1
40 CAPSULE, DELAYED RELEASE ORAL EVERY MORNING
Qty: 90 CAPSULE | Refills: 3 | Status: SHIPPED | OUTPATIENT
Start: 2025-02-24 | End: 2025-02-24 | Stop reason: SDUPTHER

## 2025-02-24 RX ORDER — OMEPRAZOLE 20 MG/1
20 CAPSULE, DELAYED RELEASE ORAL
Qty: 180 CAPSULE | Refills: 3 | Status: SHIPPED | OUTPATIENT
Start: 2025-02-24 | End: 2026-02-24

## 2025-02-24 RX ORDER — AMOXICILLIN 500 MG/1
500 TABLET, FILM COATED ORAL DAILY PRN
COMMUNITY

## 2025-02-24 RX ORDER — FINASTERIDE 5 MG/1
5 TABLET, FILM COATED ORAL NIGHTLY
Qty: 90 TABLET | Refills: 3 | Status: SHIPPED | OUTPATIENT
Start: 2025-02-24

## 2025-02-24 RX ORDER — EZETIMIBE 10 MG/1
10 TABLET ORAL DAILY
Qty: 90 TABLET | Refills: 3 | Status: SHIPPED | OUTPATIENT
Start: 2025-02-24 | End: 2026-02-24

## 2025-02-24 RX ORDER — ICOSAPENT ETHYL 1 G/1
1 CAPSULE ORAL NIGHTLY
Qty: 90 CAPSULE | Refills: 3 | Status: SHIPPED | OUTPATIENT
Start: 2025-02-24 | End: 2026-02-24

## 2025-02-24 RX ORDER — ATORVASTATIN CALCIUM 40 MG/1
40 TABLET, FILM COATED ORAL NIGHTLY
Qty: 90 TABLET | Refills: 3 | Status: SHIPPED | OUTPATIENT
Start: 2025-02-24

## 2025-02-24 NOTE — PROGRESS NOTES
Subjective     Patient ID: Pietro Dietz is a 65 y.o. male.    Chief Complaint: Annual Exam    HPI 65-year-old white male with hyperlipidemia, aortic atherosclerosis, fatty liver, GERD, chronic cough, anxiety, sleep apnea, BPH, and obesity presents to clinic today for general exam.  Continues to be followed by Cardiology and underwent cardiovascular screening via coronary calcium score in the past secondary to a strong family history of heart disease.  Coronary calcium score returned elevated; therefore, the patient was started on aspirin 81 mg daily, Lipitor 40 mg daily, Zetia 10 mg daily, and Vascepa 1 g nightly.  Hyperlipidemia is currently stable as is aortic atherosclerosis.  GERD and chronic cough remained well-controlled on omeprazole 20 mg b.i.d..  He has been followed by Urology in the past secondary to BPH which is stable on Proscar 5 mg daily.  Anxiety remains stable on Paxil 10 mg daily. He reports a past surgical history of appendectomy, LASIK, cosmetic surgery, and open reduction and internal fixation of a right calcaneal fracture. He reports a strong family history of heart disease with his brother and father both having heart attacks in their 60s. His mother has hypertension. He is up-to-date with all screening exams.  Flu vaccine has been declined.  RSV and shingles vaccine will be obtained through pharmacy.    Review of Systems   Constitutional:  Negative for activity change and unexpected weight change.   HENT:  Negative for hearing loss, rhinorrhea and trouble swallowing.    Eyes:  Negative for discharge and visual disturbance.   Respiratory:  Negative for chest tightness and wheezing.    Cardiovascular:  Negative for chest pain and palpitations.   Gastrointestinal:  Negative for blood in stool, constipation, diarrhea and vomiting.   Endocrine: Negative for polydipsia and polyuria.   Genitourinary:  Negative for difficulty urinating, hematuria and urgency.   Musculoskeletal:  Negative for  arthralgias, joint swelling and neck pain.   Neurological:  Negative for weakness and headaches.   Psychiatric/Behavioral:  Negative for confusion and dysphoric mood.           Objective     Physical Exam  Vitals and nursing note reviewed.   Constitutional:       General: He is not in acute distress.     Appearance: Normal appearance. He is well-developed. He is not diaphoretic.   HENT:      Head: Normocephalic and atraumatic.      Right Ear: External ear normal.      Left Ear: External ear normal.      Nose: Nose normal.      Mouth/Throat:      Pharynx: No oropharyngeal exudate.   Eyes:      General: No scleral icterus.        Right eye: No discharge.         Left eye: No discharge.      Conjunctiva/sclera: Conjunctivae normal.      Pupils: Pupils are equal, round, and reactive to light.   Neck:      Thyroid: No thyromegaly.      Vascular: No JVD.      Trachea: No tracheal deviation.   Cardiovascular:      Rate and Rhythm: Normal rate and regular rhythm.      Heart sounds: Normal heart sounds. No murmur heard.     No friction rub. No gallop.   Pulmonary:      Effort: Pulmonary effort is normal. No respiratory distress.      Breath sounds: Normal breath sounds. No stridor. No wheezing, rhonchi or rales.   Chest:      Chest wall: No tenderness.   Abdominal:      General: Bowel sounds are normal. There is no distension.      Palpations: Abdomen is soft. There is no mass.      Tenderness: There is no abdominal tenderness. There is no guarding or rebound.   Musculoskeletal:         General: No tenderness. Normal range of motion.      Cervical back: Normal range of motion and neck supple.   Lymphadenopathy:      Cervical: No cervical adenopathy.   Skin:     General: Skin is warm and dry.      Coloration: Skin is not pale.      Findings: No erythema or rash.   Neurological:      Mental Status: He is alert and oriented to person, place, and time.   Psychiatric:         Mood and Affect: Mood normal.         Behavior:  Behavior normal.         Thought Content: Thought content normal.         Judgment: Judgment normal.            Assessment and Plan     1. Follow-up exam, 7 months to 1 year since previous exam  -     CBC Auto Differential; Future; Expected date: 02/24/2025  -     Comprehensive Metabolic Panel; Future; Expected date: 02/24/2025  -     Lipid Panel; Future; Expected date: 02/24/2025  -     T4, Free; Future; Expected date: 02/24/2025  -     TSH; Future; Expected date: 02/24/2025  -     PSA, Screening; Future; Expected date: 02/24/2025  -     Hemoglobin A1C; Future; Expected date: 02/24/2025    2. Other hyperlipidemia  -     atorvastatin (LIPITOR) 40 MG tablet; Take 1 tablet (40 mg total) by mouth every evening.  Dispense: 90 tablet; Refill: 3  -     ezetimibe (ZETIA) 10 mg tablet; Take 1 tablet (10 mg total) by mouth once daily.  Dispense: 90 tablet; Refill: 3  -     icosapent ethyL (VASCEPA) 1 gram Cap; Take 1 capsule (1 g total) by mouth every evening.  Dispense: 90 capsule; Refill: 3  -     CBC Auto Differential; Future; Expected date: 02/24/2025  -     Comprehensive Metabolic Panel; Future; Expected date: 02/24/2025  -     Lipid Panel; Future; Expected date: 02/24/2025  -     T4, Free; Future; Expected date: 02/24/2025  -     TSH; Future; Expected date: 02/24/2025  -     PSA, Screening; Future; Expected date: 02/24/2025  -     Hemoglobin A1C; Future; Expected date: 02/24/2025    3. Aortic atherosclerosis    4. Fatty liver  -     CBC Auto Differential; Future; Expected date: 02/24/2025  -     Comprehensive Metabolic Panel; Future; Expected date: 02/24/2025  -     Lipid Panel; Future; Expected date: 02/24/2025  -     T4, Free; Future; Expected date: 02/24/2025  -     TSH; Future; Expected date: 02/24/2025  -     PSA, Screening; Future; Expected date: 02/24/2025  -     Hemoglobin A1C; Future; Expected date: 02/24/2025    5. Gastroesophageal reflux disease without esophagitis  -     CBC Auto Differential; Future;  Expected date: 02/24/2025  -     Comprehensive Metabolic Panel; Future; Expected date: 02/24/2025  -     Lipid Panel; Future; Expected date: 02/24/2025  -     T4, Free; Future; Expected date: 02/24/2025  -     TSH; Future; Expected date: 02/24/2025  -     PSA, Screening; Future; Expected date: 02/24/2025  -     Hemoglobin A1C; Future; Expected date: 02/24/2025    6. Chronic cough  -     omeprazole (PRILOSEC) 20 MG capsule; Take 1 capsule (20 mg total) by mouth 2 (two) times daily before meals.  Dispense: 180 capsule; Refill: 3  -     CBC Auto Differential; Future; Expected date: 02/24/2025  -     Comprehensive Metabolic Panel; Future; Expected date: 02/24/2025  -     Lipid Panel; Future; Expected date: 02/24/2025  -     T4, Free; Future; Expected date: 02/24/2025  -     TSH; Future; Expected date: 02/24/2025  -     PSA, Screening; Future; Expected date: 02/24/2025  -     Hemoglobin A1C; Future; Expected date: 02/24/2025    7. Anxiety  -     paroxetine (PAXIL) 10 MG tablet; Take 1 tablet (10 mg total) by mouth every evening.  Dispense: 90 tablet; Refill: 3  -     CBC Auto Differential; Future; Expected date: 02/24/2025  -     Comprehensive Metabolic Panel; Future; Expected date: 02/24/2025  -     Lipid Panel; Future; Expected date: 02/24/2025  -     T4, Free; Future; Expected date: 02/24/2025  -     TSH; Future; Expected date: 02/24/2025  -     PSA, Screening; Future; Expected date: 02/24/2025  -     Hemoglobin A1C; Future; Expected date: 02/24/2025    8. Obstructive sleep apnea syndrome  -     CBC Auto Differential; Future; Expected date: 02/24/2025  -     Comprehensive Metabolic Panel; Future; Expected date: 02/24/2025  -     Lipid Panel; Future; Expected date: 02/24/2025  -     T4, Free; Future; Expected date: 02/24/2025  -     TSH; Future; Expected date: 02/24/2025  -     PSA, Screening; Future; Expected date: 02/24/2025  -     Hemoglobin A1C; Future; Expected date: 02/24/2025    9. Benign prostatic hyperplasia,  unspecified whether lower urinary tract symptoms present  -     CBC Auto Differential; Future; Expected date: 02/24/2025  -     Comprehensive Metabolic Panel; Future; Expected date: 02/24/2025  -     Lipid Panel; Future; Expected date: 02/24/2025  -     T4, Free; Future; Expected date: 02/24/2025  -     TSH; Future; Expected date: 02/24/2025  -     PSA, Screening; Future; Expected date: 02/24/2025  -     Hemoglobin A1C; Future; Expected date: 02/24/2025    10. IFG (impaired fasting glucose)  -     CBC Auto Differential; Future; Expected date: 02/24/2025  -     Comprehensive Metabolic Panel; Future; Expected date: 02/24/2025  -     Lipid Panel; Future; Expected date: 02/24/2025  -     T4, Free; Future; Expected date: 02/24/2025  -     TSH; Future; Expected date: 02/24/2025  -     PSA, Screening; Future; Expected date: 02/24/2025  -     Hemoglobin A1C; Future; Expected date: 02/24/2025    11. Prostate cancer screening  -     CBC Auto Differential; Future; Expected date: 02/24/2025  -     Comprehensive Metabolic Panel; Future; Expected date: 02/24/2025  -     Lipid Panel; Future; Expected date: 02/24/2025  -     T4, Free; Future; Expected date: 02/24/2025  -     TSH; Future; Expected date: 02/24/2025  -     PSA, Screening; Future; Expected date: 02/24/2025  -     Hemoglobin A1C; Future; Expected date: 02/24/2025    12. Class 1 obesity with serious comorbidity and body mass index (BMI) of 33.0 to 33.9 in adult, unspecified obesity type    Other orders  -     finasteride (PROSCAR) 5 mg tablet; Take 1 tablet (5 mg total) by mouth every evening.  Dispense: 90 tablet; Refill: 3        1. CBC, CMP, TSH, free T4, fasting lipids, hemoglobin A1c, and PSA.    2, 3.  Continue aspirin 81 mg daily, Lipitor 40 mg nightly, Zetia 10 mg daily, and Vascepa 1 g daily.  Hyperlipidemia and aortic atherosclerosis are stable.  4. Continue diet and exercise.  Fatty liver is stable.    5,6. Continue omeprazole 20 mg b.i.d..  GERD and chronic  cough are stable.    7. Continue Paxil 10 mg nightly.  Anxiety is stable.    8. Continue use of CPAP nightly.  Sleep apnea is well controlled.  9. Continue Proscar 5 mg nightly.  BPH is stable.    10. Continue diet and exercise.  The patient has lost 10 lb since last year.    11. Will check A1c for further evaluation are impaired fasting glucose.    12. PSA screen.    13. Return to clinic as needed or in 1 year for general exam.     I spent a total of 40 minutes on the day of the visit.This includes face to face time and non-face to face time preparing to see the patient (eg, review of tests), obtaining and/or reviewing separately obtained history, documenting clinical information in the electronic or other health record, independently interpreting results and communicating results to the patient/family/caregiver, or care coordinator.           Follow up in about 1 year (around 2/24/2026), or if symptoms worsen or fail to improve, for Annual exam.

## 2025-02-24 NOTE — TELEPHONE ENCOUNTER
No care due was identified.  Health [facility] Embedded Care Due Messages. Reference number: [id number].   2/24/2025 8:14:20 AM CST

## 2025-02-24 NOTE — TELEPHONE ENCOUNTER
Pt asking for their omeprazole to be sent to [facility] instead of [facility], rx pended  Last sent in 10/31/24  Appt today with you

## 2025-02-25 ENCOUNTER — RESULTS FOLLOW-UP (OUTPATIENT)
Dept: INTERNAL MEDICINE | Facility: CLINIC | Age: 65
End: 2025-02-25

## 2025-02-25 ENCOUNTER — LAB VISIT (OUTPATIENT)
Dept: LAB | Facility: HOSPITAL | Age: 65
End: 2025-02-25
Attending: FAMILY MEDICINE
Payer: MEDICARE

## 2025-02-25 DIAGNOSIS — K76.0 FATTY LIVER: ICD-10-CM

## 2025-02-25 DIAGNOSIS — R73.01 IFG (IMPAIRED FASTING GLUCOSE): ICD-10-CM

## 2025-02-25 DIAGNOSIS — N40.0 BENIGN PROSTATIC HYPERPLASIA, UNSPECIFIED WHETHER LOWER URINARY TRACT SYMPTOMS PRESENT: ICD-10-CM

## 2025-02-25 DIAGNOSIS — R05.3 CHRONIC COUGH: ICD-10-CM

## 2025-02-25 DIAGNOSIS — K21.9 GASTROESOPHAGEAL REFLUX DISEASE WITHOUT ESOPHAGITIS: ICD-10-CM

## 2025-02-25 DIAGNOSIS — E78.49 OTHER HYPERLIPIDEMIA: ICD-10-CM

## 2025-02-25 DIAGNOSIS — F41.9 ANXIETY: ICD-10-CM

## 2025-02-25 DIAGNOSIS — Z12.5 PROSTATE CANCER SCREENING: ICD-10-CM

## 2025-02-25 DIAGNOSIS — Z09 FOLLOW-UP EXAM, 7 MONTHS TO 1 YEAR SINCE PREVIOUS EXAM: ICD-10-CM

## 2025-02-25 DIAGNOSIS — G47.33 OBSTRUCTIVE SLEEP APNEA SYNDROME: ICD-10-CM

## 2025-02-25 LAB
ALBUMIN SERPL BCP-MCNC: 3.8 G/DL (ref 3.5–5.2)
ALP SERPL-CCNC: 96 U/L (ref 40–150)
ALT SERPL W/O P-5'-P-CCNC: 57 U/L (ref 10–44)
ANION GAP SERPL CALC-SCNC: 7 MMOL/L (ref 8–16)
AST SERPL-CCNC: 43 U/L (ref 10–40)
BASOPHILS # BLD AUTO: 0.03 K/UL (ref 0–0.2)
BASOPHILS NFR BLD: 0.4 % (ref 0–1.9)
BILIRUB SERPL-MCNC: 0.9 MG/DL (ref 0.1–1)
BUN SERPL-MCNC: 22 MG/DL (ref 8–23)
CALCIUM SERPL-MCNC: 9.3 MG/DL (ref 8.7–10.5)
CHLORIDE SERPL-SCNC: 108 MMOL/L (ref 95–110)
CHOLEST SERPL-MCNC: 125 MG/DL (ref 120–199)
CHOLEST/HDLC SERPL: 2.5 {RATIO} (ref 2–5)
CO2 SERPL-SCNC: 24 MMOL/L (ref 23–29)
COMPLEXED PSA SERPL-MCNC: 1.9 NG/ML (ref 0–4)
CREAT SERPL-MCNC: 0.9 MG/DL (ref 0.5–1.4)
DIFFERENTIAL METHOD BLD: ABNORMAL
EOSINOPHIL # BLD AUTO: 0.2 K/UL (ref 0–0.5)
EOSINOPHIL NFR BLD: 2.4 % (ref 0–8)
ERYTHROCYTE [DISTWIDTH] IN BLOOD BY AUTOMATED COUNT: 13.3 % (ref 11.5–14.5)
EST. GFR  (NO RACE VARIABLE): >60 ML/MIN/1.73 M^2
ESTIMATED AVG GLUCOSE: 108 MG/DL (ref 68–131)
GLUCOSE SERPL-MCNC: 102 MG/DL (ref 70–110)
HBA1C MFR BLD: 5.4 % (ref 4–5.6)
HCT VFR BLD AUTO: 46.8 % (ref 40–54)
HDLC SERPL-MCNC: 50 MG/DL (ref 40–75)
HDLC SERPL: 40 % (ref 20–50)
HGB BLD-MCNC: 14.9 G/DL (ref 14–18)
IMM GRANULOCYTES # BLD AUTO: 0.02 K/UL (ref 0–0.04)
IMM GRANULOCYTES NFR BLD AUTO: 0.3 % (ref 0–0.5)
LDLC SERPL CALC-MCNC: 57.2 MG/DL (ref 63–159)
LYMPHOCYTES # BLD AUTO: 1.2 K/UL (ref 1–4.8)
LYMPHOCYTES NFR BLD: 15.6 % (ref 18–48)
MCH RBC QN AUTO: 28.9 PG (ref 27–31)
MCHC RBC AUTO-ENTMCNC: 31.8 G/DL (ref 32–36)
MCV RBC AUTO: 91 FL (ref 82–98)
MONOCYTES # BLD AUTO: 0.5 K/UL (ref 0.3–1)
MONOCYTES NFR BLD: 6.2 % (ref 4–15)
NEUTROPHILS # BLD AUTO: 5.6 K/UL (ref 1.8–7.7)
NEUTROPHILS NFR BLD: 75.1 % (ref 38–73)
NONHDLC SERPL-MCNC: 75 MG/DL
NRBC BLD-RTO: 0 /100 WBC
PLATELET # BLD AUTO: 223 K/UL (ref 150–450)
PMV BLD AUTO: 11.2 FL (ref 9.2–12.9)
POTASSIUM SERPL-SCNC: 4.5 MMOL/L (ref 3.5–5.1)
PROT SERPL-MCNC: 6.6 G/DL (ref 6–8.4)
RBC # BLD AUTO: 5.15 M/UL (ref 4.6–6.2)
SODIUM SERPL-SCNC: 139 MMOL/L (ref 136–145)
T4 FREE SERPL-MCNC: 0.78 NG/DL (ref 0.71–1.51)
TRIGL SERPL-MCNC: 89 MG/DL (ref 30–150)
TSH SERPL DL<=0.005 MIU/L-ACNC: 1.6 UIU/ML (ref 0.4–4)
WBC # BLD AUTO: 7.44 K/UL (ref 3.9–12.7)

## 2025-02-25 PROCEDURE — 83036 HEMOGLOBIN GLYCOSYLATED A1C: CPT | Performed by: FAMILY MEDICINE

## 2025-02-25 PROCEDURE — 84153 ASSAY OF PSA TOTAL: CPT | Performed by: FAMILY MEDICINE

## 2025-02-25 PROCEDURE — 80053 COMPREHEN METABOLIC PANEL: CPT | Performed by: FAMILY MEDICINE

## 2025-02-25 PROCEDURE — 85025 COMPLETE CBC W/AUTO DIFF WBC: CPT | Performed by: FAMILY MEDICINE

## 2025-02-25 PROCEDURE — 84443 ASSAY THYROID STIM HORMONE: CPT | Performed by: FAMILY MEDICINE

## 2025-02-25 PROCEDURE — 36415 COLL VENOUS BLD VENIPUNCTURE: CPT | Mod: PO | Performed by: FAMILY MEDICINE

## 2025-02-25 PROCEDURE — 84439 ASSAY OF FREE THYROXINE: CPT | Performed by: FAMILY MEDICINE

## 2025-02-25 PROCEDURE — 80061 LIPID PANEL: CPT | Performed by: FAMILY MEDICINE

## 2025-03-03 ENCOUNTER — OFFICE VISIT (OUTPATIENT)
Dept: URGENT CARE | Facility: CLINIC | Age: 65
End: 2025-03-03
Payer: MEDICARE

## 2025-03-03 VITALS
HEART RATE: 59 BPM | RESPIRATION RATE: 20 BRPM | TEMPERATURE: 98 F | SYSTOLIC BLOOD PRESSURE: 123 MMHG | BODY MASS INDEX: 32.78 KG/M2 | WEIGHT: 204 LBS | DIASTOLIC BLOOD PRESSURE: 82 MMHG | OXYGEN SATURATION: 97 % | HEIGHT: 66 IN

## 2025-03-03 DIAGNOSIS — J10.1 INFLUENZA A: Primary | ICD-10-CM

## 2025-03-03 DIAGNOSIS — R05.9 COUGH, UNSPECIFIED TYPE: ICD-10-CM

## 2025-03-03 LAB
CTP QC/QA: YES
CTP QC/QA: YES
POC MOLECULAR INFLUENZA A AGN: POSITIVE
POC MOLECULAR INFLUENZA B AGN: NEGATIVE
SARS CORONAVIRUS 2 ANTIGEN: NEGATIVE

## 2025-03-03 PROCEDURE — 99213 OFFICE O/P EST LOW 20 MIN: CPT | Mod: S$GLB,,, | Performed by: NURSE PRACTITIONER

## 2025-03-03 PROCEDURE — 87502 INFLUENZA DNA AMP PROBE: CPT | Mod: QW,S$GLB,, | Performed by: NURSE PRACTITIONER

## 2025-03-03 PROCEDURE — 87811 SARS-COV-2 COVID19 W/OPTIC: CPT | Mod: QW,S$GLB,, | Performed by: NURSE PRACTITIONER

## 2025-03-03 RX ORDER — PROMETHAZINE HYDROCHLORIDE AND DEXTROMETHORPHAN HYDROBROMIDE 6.25; 15 MG/5ML; MG/5ML
5 SYRUP ORAL NIGHTLY PRN
Qty: 120 ML | Refills: 0 | Status: SHIPPED | OUTPATIENT
Start: 2025-03-03 | End: 2025-03-13

## 2025-03-03 NOTE — PROGRESS NOTES
"Subjective:      Patient ID: Pietro Dietz is a 65 y.o. male.    Vitals:  height is 5' 6" (1.676 m) and weight is 92.5 kg (204 lb). His oral temperature is 98.2 °F (36.8 °C). His blood pressure is 123/82 and his pulse is 59 (abnormal). His respiration is 20 and oxygen saturation is 97%.     Chief Complaint: Sinus Problem    Pt present with symptoms of- Chills, Shaky, Cold Sweats, Feverish, Runny Nose, Cough and Headache X 1 week.     Sinus Problem  This is a new problem. The current episode started in the past 7 days. The problem has been gradually worsening since onset. His pain is at a severity of 0/10. He is experiencing no pain. Associated symptoms include chills, coughing, headaches and sinus pressure. Pertinent negatives include no congestion, diaphoresis, ear pain, hoarse voice, neck pain, shortness of breath, sneezing, sore throat or swollen glands. Treatments tried: OTC- Cough Med, NSAIDS  and Zytec. The treatment provided mild relief.       Constitution: Positive for chills, fatigue and fever. Negative for sweating.   HENT:  Positive for sinus pressure. Negative for ear pain, congestion and sore throat.    Neck: Negative for neck pain.   Cardiovascular:  Negative for chest pain.   Respiratory:  Positive for cough and sputum production. Negative for chest tightness, shortness of breath and wheezing.    Allergic/Immunologic: Negative for sneezing.   Neurological:  Positive for headaches.      Objective:     Physical Exam   Constitutional: He is oriented to person, place, and time. He appears well-developed. He is cooperative.  Non-toxic appearance. He does not appear ill. No distress.   HENT:   Head: Normocephalic and atraumatic.   Ears:   Right Ear: Hearing, tympanic membrane, external ear and ear canal normal.   Left Ear: Hearing, tympanic membrane, external ear and ear canal normal.   Nose: Nose normal. No mucosal edema, rhinorrhea or nasal deformity. No epistaxis. Right sinus exhibits no maxillary " sinus tenderness and no frontal sinus tenderness. Left sinus exhibits no maxillary sinus tenderness and no frontal sinus tenderness.   Mouth/Throat: Uvula is midline, oropharynx is clear and moist and mucous membranes are normal. No trismus in the jaw. Normal dentition. No uvula swelling. No oropharyngeal exudate, posterior oropharyngeal edema or posterior oropharyngeal erythema.   Eyes: Conjunctivae and lids are normal. No scleral icterus.   Neck: Trachea normal and phonation normal. Neck supple. No edema present. No erythema present. No neck rigidity present.   Cardiovascular: Normal rate, regular rhythm, normal heart sounds and normal pulses.   Pulmonary/Chest: Effort normal and breath sounds normal. No respiratory distress. He has no decreased breath sounds. He has no wheezes. He has no rhonchi.   Abdominal: Normal appearance.   Musculoskeletal: Normal range of motion.         General: No deformity. Normal range of motion.   Neurological: He is alert and oriented to person, place, and time. He exhibits normal muscle tone. Coordination normal.   Skin: Skin is warm, dry, intact, not diaphoretic and not pale.   Psychiatric: His speech is normal and behavior is normal. Judgment and thought content normal.   Nursing note and vitals reviewed.    Results for orders placed or performed in visit on 03/03/25   POCT Influenza A/B MOLECULAR    Collection Time: 03/03/25  5:48 PM   Result Value Ref Range    POC Molecular Influenza A Ag Positive (A) Negative    POC Molecular Influenza B Ag Negative Negative     Acceptable Yes    SARS Coronavirus 2 Antigen, POCT Manual Read    Collection Time: 03/03/25  5:49 PM   Result Value Ref Range    SARS Coronavirus 2 Antigen Negative Negative, Presumptive Negative     Acceptable Yes          Assessment:     1. Influenza A    2. Cough, unspecified type        Plan:   Flu symptoms x1 week.  States he is improving but his wife wanted him to come in and get  checked out.  He is still having chills/sweats in the evening with an occasional cough.  Denies shortness of breath or chest pain.  No recent fevers.    Influenza A    Cough, unspecified type  -     SARS Coronavirus 2 Antigen, POCT Manual Read  -     POCT Influenza A/B MOLECULAR  -     promethazine-dextromethorphan (PROMETHAZINE-DM) 6.25-15 mg/5 mL Syrp; Take 5 mLs by mouth nightly as needed (cough).  Dispense: 120 mL; Refill: 0

## 2025-09-03 ENCOUNTER — TELEPHONE (OUTPATIENT)
Dept: ORTHOPEDICS | Facility: CLINIC | Age: 65
End: 2025-09-03
Payer: MEDICARE

## 2025-09-03 DIAGNOSIS — M79.645 CHRONIC PAIN OF LEFT THUMB: Primary | ICD-10-CM

## 2025-09-03 DIAGNOSIS — G89.29 CHRONIC PAIN OF LEFT THUMB: Primary | ICD-10-CM

## 2025-09-04 ENCOUNTER — HOSPITAL ENCOUNTER (OUTPATIENT)
Dept: RADIOLOGY | Facility: OTHER | Age: 65
Discharge: HOME | End: 2025-09-04
Attending: ORTHOPAEDIC SURGERY
Payer: MEDICARE

## 2025-09-04 ENCOUNTER — OFFICE VISIT (OUTPATIENT)
Dept: ORTHOPEDICS | Facility: CLINIC | Age: 65
End: 2025-09-04
Payer: MEDICARE

## 2025-09-04 VITALS — WEIGHT: 200.63 LBS | HEIGHT: 66 IN | BODY MASS INDEX: 32.24 KG/M2

## 2025-09-04 DIAGNOSIS — G89.29 CHRONIC PAIN OF LEFT THUMB: ICD-10-CM

## 2025-09-04 DIAGNOSIS — M79.645 CHRONIC PAIN OF LEFT THUMB: ICD-10-CM

## 2025-09-04 DIAGNOSIS — M18.12 ARTHRITIS OF CARPOMETACARPAL (CMC) JOINT OF LEFT THUMB: Primary | ICD-10-CM

## 2025-09-04 PROCEDURE — 20600 DRAIN/INJ JOINT/BURSA W/O US: CPT | Mod: PBBFAC,LT | Performed by: ORTHOPAEDIC SURGERY

## 2025-09-04 PROCEDURE — 99999PBSHW PR PBB SHADOW TECHNICAL ONLY FILED TO HB: Mod: PBBFAC,,,

## 2025-09-04 PROCEDURE — 73140 X-RAY EXAM OF FINGER(S): CPT | Mod: 26,LT,, | Performed by: STUDENT IN AN ORGANIZED HEALTH CARE EDUCATION/TRAINING PROGRAM

## 2025-09-04 PROCEDURE — 73140 X-RAY EXAM OF FINGER(S): CPT | Mod: TC,LT

## 2025-09-04 PROCEDURE — 99999 PR PBB SHADOW E&M-EST. PATIENT-LVL III: CPT | Mod: PBBFAC,,, | Performed by: ORTHOPAEDIC SURGERY

## 2025-09-04 PROCEDURE — 99213 OFFICE O/P EST LOW 20 MIN: CPT | Mod: PBBFAC,25 | Performed by: ORTHOPAEDIC SURGERY

## 2025-09-04 RX ORDER — METHYLPREDNISOLONE ACETATE 40 MG/ML
20 INJECTION, SUSPENSION INTRA-ARTICULAR; INTRALESIONAL; INTRAMUSCULAR; SOFT TISSUE
Status: DISCONTINUED | OUTPATIENT
Start: 2025-09-04 | End: 2025-09-04 | Stop reason: HOSPADM

## 2025-09-04 RX ADMIN — METHYLPREDNISOLONE ACETATE 20 MG: 40 INJECTION, SUSPENSION INTRA-ARTICULAR; INTRALESIONAL; INTRAMUSCULAR; SOFT TISSUE at 08:09

## (undated) DEVICE — PADDING WYTEX UNDRCST 6INX4YD

## (undated) DEVICE — BLADE SURG #15 CARBON STEEL

## (undated) DEVICE — SLEEVE PROTECTIVE 6X9 NON STER

## (undated) DEVICE — PAD CAST 2 IN X 4YDS STERILE

## (undated) DEVICE — DRAPE C ARM 42 X 120 10/BX

## (undated) DEVICE — STOCKINETTE SGL PLY 6X48IN

## (undated) DEVICE — DRESSING ADAPTIC N ADH 3X8IN

## (undated) DEVICE — PAD CAST SPECIALIST STRL 4

## (undated) DEVICE — Device

## (undated) DEVICE — DRAPE STERI U-SHAPED 47X51IN

## (undated) DEVICE — PAD ABD 8X10 STERILE

## (undated) DEVICE — SEE MEDLINE ITEM 152529

## (undated) DEVICE — TAPE STERILE 1IN

## (undated) DEVICE — PAD CAST SPECIALIST STRL 6

## (undated) DEVICE — SEE MEDLINE ITEM 146298

## (undated) DEVICE — SEE MEDLINE ITEM 157150

## (undated) DEVICE — BANDAGE ESMARK ELASTIC ST 4X9

## (undated) DEVICE — BANDAGE ELAS SOFTWRAP ST 4X5YD

## (undated) DEVICE — IMPLANTABLE DEVICE
Type: IMPLANTABLE DEVICE | Site: HEEL | Status: NON-FUNCTIONAL
Removed: 2021-11-05

## (undated) DEVICE — DRAPE C-ARMOR EQUIPMENT COVER

## (undated) DEVICE — SUT ETHICON 3-0 BLK MONO PS

## (undated) DEVICE — TRAY MINOR ORTHO

## (undated) DEVICE — SPONGE GAUZE 16PLY 4X4

## (undated) DEVICE — BLADE SAGITTA 5/BX

## (undated) DEVICE — DRAPE STERI-DRAPE 1000 17X11IN

## (undated) DEVICE — ELECTRODE REM PLYHSV RETURN 9

## (undated) DEVICE — STOCKINET TUBULAR 1 PLY 6X60IN

## (undated) DEVICE — CONTAINER SPECIMEN STRL 4OZ

## (undated) DEVICE — SUT VICRYL 2-0 CT-2 VCP269H

## (undated) DEVICE — SUT MCRYL PLUS 3-0 PS2 27IN

## (undated) DEVICE — GAUZE SPONGE 4X4 12PLY

## (undated) DEVICE — DRESSING N ADH OIL EMUL 3X8

## (undated) DEVICE — BANDAGE ELAS SOFTWRAP ST 6X5YD